# Patient Record
Sex: FEMALE | Race: WHITE | NOT HISPANIC OR LATINO | URBAN - METROPOLITAN AREA
[De-identification: names, ages, dates, MRNs, and addresses within clinical notes are randomized per-mention and may not be internally consistent; named-entity substitution may affect disease eponyms.]

---

## 2020-12-10 ENCOUNTER — INPATIENT (INPATIENT)
Facility: HOSPITAL | Age: 85
LOS: 4 days | Discharge: NURSING, REHAB OR LONG-TERM CARE | DRG: 293 | End: 2020-12-15
Attending: INTERNAL MEDICINE | Admitting: INTERNAL MEDICINE
Payer: MEDICARE

## 2020-12-10 VITALS
SYSTOLIC BLOOD PRESSURE: 124 MMHG | RESPIRATION RATE: 16 BRPM | DIASTOLIC BLOOD PRESSURE: 64 MMHG | TEMPERATURE: 98 F | OXYGEN SATURATION: 93 % | HEART RATE: 87 BPM | WEIGHT: 125 LBS

## 2020-12-10 DIAGNOSIS — R06.02 SHORTNESS OF BREATH: ICD-10-CM

## 2020-12-10 DIAGNOSIS — Z29.9 ENCOUNTER FOR PROPHYLACTIC MEASURES, UNSPECIFIED: ICD-10-CM

## 2020-12-10 DIAGNOSIS — F32.9 MAJOR DEPRESSIVE DISORDER, SINGLE EPISODE, UNSPECIFIED: ICD-10-CM

## 2020-12-10 LAB
ALBUMIN SERPL ELPH-MCNC: 2.7 G/DL — LOW (ref 3.5–5)
ALP SERPL-CCNC: 115 U/L — SIGNIFICANT CHANGE UP (ref 40–120)
ALT FLD-CCNC: 28 U/L DA — SIGNIFICANT CHANGE UP (ref 10–60)
ANION GAP SERPL CALC-SCNC: 9 MMOL/L — SIGNIFICANT CHANGE UP (ref 5–17)
APTT BLD: 29.9 SEC — SIGNIFICANT CHANGE UP (ref 27.5–35.5)
AST SERPL-CCNC: 30 U/L — SIGNIFICANT CHANGE UP (ref 10–40)
BASOPHILS # BLD AUTO: 0.04 K/UL — SIGNIFICANT CHANGE UP (ref 0–0.2)
BASOPHILS NFR BLD AUTO: 0.5 % — SIGNIFICANT CHANGE UP (ref 0–2)
BILIRUB SERPL-MCNC: 0.4 MG/DL — SIGNIFICANT CHANGE UP (ref 0.2–1.2)
BUN SERPL-MCNC: 23 MG/DL — HIGH (ref 7–18)
CALCIUM SERPL-MCNC: 8.7 MG/DL — SIGNIFICANT CHANGE UP (ref 8.4–10.5)
CHLORIDE SERPL-SCNC: 101 MMOL/L — SIGNIFICANT CHANGE UP (ref 96–108)
CO2 SERPL-SCNC: 28 MMOL/L — SIGNIFICANT CHANGE UP (ref 22–31)
CREAT SERPL-MCNC: 0.66 MG/DL — SIGNIFICANT CHANGE UP (ref 0.5–1.3)
EOSINOPHIL # BLD AUTO: 0.16 K/UL — SIGNIFICANT CHANGE UP (ref 0–0.5)
EOSINOPHIL NFR BLD AUTO: 2 % — SIGNIFICANT CHANGE UP (ref 0–6)
GLUCOSE SERPL-MCNC: 104 MG/DL — HIGH (ref 70–99)
HCT VFR BLD CALC: 41.4 % — SIGNIFICANT CHANGE UP (ref 34.5–45)
HGB BLD-MCNC: 13.4 G/DL — SIGNIFICANT CHANGE UP (ref 11.5–15.5)
IMM GRANULOCYTES NFR BLD AUTO: 0.4 % — SIGNIFICANT CHANGE UP (ref 0–1.5)
INR BLD: 1.02 RATIO — SIGNIFICANT CHANGE UP (ref 0.88–1.16)
LYMPHOCYTES # BLD AUTO: 1.09 K/UL — SIGNIFICANT CHANGE UP (ref 1–3.3)
LYMPHOCYTES # BLD AUTO: 13.4 % — SIGNIFICANT CHANGE UP (ref 13–44)
MCHC RBC-ENTMCNC: 31.5 PG — SIGNIFICANT CHANGE UP (ref 27–34)
MCHC RBC-ENTMCNC: 32.4 GM/DL — SIGNIFICANT CHANGE UP (ref 32–36)
MCV RBC AUTO: 97.4 FL — SIGNIFICANT CHANGE UP (ref 80–100)
MONOCYTES # BLD AUTO: 0.97 K/UL — HIGH (ref 0–0.9)
MONOCYTES NFR BLD AUTO: 11.9 % — SIGNIFICANT CHANGE UP (ref 2–14)
NEUTROPHILS # BLD AUTO: 5.84 K/UL — SIGNIFICANT CHANGE UP (ref 1.8–7.4)
NEUTROPHILS NFR BLD AUTO: 71.8 % — SIGNIFICANT CHANGE UP (ref 43–77)
NRBC # BLD: 0 /100 WBCS — SIGNIFICANT CHANGE UP (ref 0–0)
NT-PROBNP SERPL-SCNC: 430 PG/ML — SIGNIFICANT CHANGE UP (ref 0–450)
PLATELET # BLD AUTO: 243 K/UL — SIGNIFICANT CHANGE UP (ref 150–400)
POTASSIUM SERPL-MCNC: 3.9 MMOL/L — SIGNIFICANT CHANGE UP (ref 3.5–5.3)
POTASSIUM SERPL-SCNC: 3.9 MMOL/L — SIGNIFICANT CHANGE UP (ref 3.5–5.3)
PROT SERPL-MCNC: 7.1 G/DL — SIGNIFICANT CHANGE UP (ref 6–8.3)
PROTHROM AB SERPL-ACNC: 12.1 SEC — SIGNIFICANT CHANGE UP (ref 10.6–13.6)
RAPID RVP RESULT: SIGNIFICANT CHANGE UP
RBC # BLD: 4.25 M/UL — SIGNIFICANT CHANGE UP (ref 3.8–5.2)
RBC # FLD: 13.3 % — SIGNIFICANT CHANGE UP (ref 10.3–14.5)
SARS-COV-2 RNA SPEC QL NAA+PROBE: SIGNIFICANT CHANGE UP
SODIUM SERPL-SCNC: 138 MMOL/L — SIGNIFICANT CHANGE UP (ref 135–145)
TROPONIN I SERPL-MCNC: <0.015 NG/ML — SIGNIFICANT CHANGE UP (ref 0–0.04)
WBC # BLD: 8.13 K/UL — SIGNIFICANT CHANGE UP (ref 3.8–10.5)
WBC # FLD AUTO: 8.13 K/UL — SIGNIFICANT CHANGE UP (ref 3.8–10.5)

## 2020-12-10 PROCEDURE — 93010 ELECTROCARDIOGRAM REPORT: CPT

## 2020-12-10 PROCEDURE — 99285 EMERGENCY DEPT VISIT HI MDM: CPT

## 2020-12-10 PROCEDURE — 71045 X-RAY EXAM CHEST 1 VIEW: CPT | Mod: 26

## 2020-12-10 RX ORDER — BRIMONIDINE TARTRATE, TIMOLOL MALEATE 2; 5 MG/ML; MG/ML
1 SOLUTION/ DROPS OPHTHALMIC
Qty: 0 | Refills: 0 | DISCHARGE

## 2020-12-10 RX ORDER — METOPROLOL TARTRATE 50 MG
1 TABLET ORAL
Qty: 0 | Refills: 0 | DISCHARGE

## 2020-12-10 RX ORDER — LATANOPROST 0.05 MG/ML
1 SOLUTION/ DROPS OPHTHALMIC; TOPICAL AT BEDTIME
Refills: 0 | Status: DISCONTINUED | OUTPATIENT
Start: 2020-12-10 | End: 2020-12-15

## 2020-12-10 RX ORDER — FUROSEMIDE 40 MG
20 TABLET ORAL DAILY
Refills: 0 | Status: DISCONTINUED | OUTPATIENT
Start: 2020-12-11 | End: 2020-12-13

## 2020-12-10 RX ORDER — SERTRALINE 25 MG/1
100 TABLET, FILM COATED ORAL DAILY
Refills: 0 | Status: DISCONTINUED | OUTPATIENT
Start: 2020-12-10 | End: 2020-12-15

## 2020-12-10 RX ORDER — FUROSEMIDE 40 MG
20 TABLET ORAL ONCE
Refills: 0 | Status: COMPLETED | OUTPATIENT
Start: 2020-12-10 | End: 2020-12-10

## 2020-12-10 RX ORDER — SERTRALINE 25 MG/1
1 TABLET, FILM COATED ORAL
Qty: 0 | Refills: 0 | DISCHARGE

## 2020-12-10 RX ORDER — ENOXAPARIN SODIUM 100 MG/ML
40 INJECTION SUBCUTANEOUS DAILY
Refills: 0 | Status: DISCONTINUED | OUTPATIENT
Start: 2020-12-10 | End: 2020-12-15

## 2020-12-10 RX ORDER — TRAVOPROST 0.04 MG/ML
1 SOLUTION/ DROPS OPHTHALMIC
Qty: 0 | Refills: 0 | DISCHARGE

## 2020-12-10 RX ORDER — BIMATOPROST 0.3 MG/ML
1 SOLUTION/ DROPS OPHTHALMIC
Qty: 0 | Refills: 0 | DISCHARGE

## 2020-12-10 RX ORDER — METOPROLOL TARTRATE 50 MG
12.5 TABLET ORAL
Refills: 0 | Status: DISCONTINUED | OUTPATIENT
Start: 2020-12-10 | End: 2020-12-14

## 2020-12-10 RX ADMIN — LATANOPROST 1 DROP(S): 0.05 SOLUTION/ DROPS OPHTHALMIC; TOPICAL at 22:14

## 2020-12-10 RX ADMIN — Medication 20 MILLIGRAM(S): at 17:19

## 2020-12-10 RX ADMIN — Medication 12.5 MILLIGRAM(S): at 18:19

## 2020-12-10 NOTE — ED ADULT NURSE NOTE - CAS EDN DISCHARGE INTERVENTIONS
IV discontinued, cath removed intact/no redness, swelling or pain at site. IV intact/no redness, swelling or pain at site./Arm band on

## 2020-12-10 NOTE — H&P ADULT - HISTORY OF PRESENT ILLNESS
Patient is a 89 year old female, from home, hard of hearing, lives alone, with no remaining relatives, with PMHx of arthritis, Depression, scoliosis, presented with chief complaint of shortness of breath. Shortness of breath started 5 weeks ago. No precipitating event. For the past 5 weeks, shortness of breath has been progressively worse to the point where she cannot walk more than a block. However patient has difficulty ambulating at baseline. Patient also has increasing orthopnea, requiring incline when she sleeps. Patient has some chest pain from the shortness of breath. Otherwise no fever, chills, cough, abdominal pain, nausea, vomiting, diarrhea. No lower extremity swelling.    ED: In the ED, patient was noted saturating 93% on RA, CXR reading with interstitial CHF. Patient originally planned for dc home, however when transport arrived, patient was 90% RA and tachypneic. Pro-BNP was 430.

## 2020-12-10 NOTE — H&P ADULT - ASSESSMENT
Patient is 89 year old female with PMHx of Depression, arthritis presenting with shortness of breath, admitted for further workup of suspect CHF.

## 2020-12-10 NOTE — ED PROVIDER NOTE - NSFOLLOWUPINSTRUCTIONS_ED_ALL_ED_FT
Dyspnea    WHAT YOU NEED TO KNOW:    Dyspnea is breathing difficulty or discomfort. You may have labored, painful, or shallow breathing. You may feel breathless or short of breath. Dyspnea can occur during rest or with activity. You may have dyspnea for a short time, or it might become chronic. Dyspnea is often a symptom of a disease or condition.    DISCHARGE INSTRUCTIONS:    Seek care immediately if:   •Your signs and symptoms are the same or worse within 24 hours of treatment.       •You have shaking chills or a fever over 102°F.       •You have new pain, pressure, or tightness in your chest.       •You have a new or worse cough or wheezing, or you cough up blood.      •You feel like you cannot get enough air.      •The skin over your ribs or on your neck sinks in when you breathe.       •You have a severe headache with vomiting and abdominal pain.       •You feel confused or dizzy.      Contact your healthcare provider or specialist if:   •You have questions or concerns about your condition or care.          Medicines:    •Medicines may be used to treat the cause of your dyspnea. Medicines may reduce swelling in your airway or decrease extra fluid from around your heart or lungs. Other medicines may be used to decrease anxiety and help you feel calm and relaxed.      •Take your medicine as directed. Contact your healthcare provider if you think your medicine is not helping or if you have side effects. Tell him or her if you are allergic to any medicine. Keep a list of the medicines, vitamins, and herbs you take. Include the amounts, and when and why you take them. Bring the list or the pill bottles to follow-up visits. Carry your medicine list with you in case of an emergency.      Manage long-term dyspnea:   •Create an action plan. You and your healthcare provider can work together to create a plan for how to handle episodes of dyspnea. The plan can include daily activities, treatment changes, and what to do if you have severe breathing problems.      •Lean forward on your elbows when you sit. This helps your lungs expand and may make it easier to breathe.      •Use pursed-lip breathing any time you feel short of breath. Breathe in through your nose and then slowly breathe out through your mouth with your lips slightly puckered. It should take you twice as long to breathe out as it did to breathe in.  Breathe in Breathe out           •Do not smoke. Nicotine and other chemicals in cigarettes and cigars can cause lung damage and make it harder to breathe. Ask your healthcare provider for information if you currently smoke and need help to quit. E-cigarettes or smokeless tobacco still contain nicotine. Talk to your healthcare provider before you use these products.       •Reach or maintain a healthy weight. Your healthcare provider can help you create a safe weight loss plan if you are overweight.      •Exercise as directed. Exercise can help your lungs work more easily. Exercise can also help you lose weight if needed. Try to get at least 30 minutes of exercise most days of the week. Your healthcare provider can help you create an exercise plan that is safe for you.      Follow up with your healthcare provider or specialist as directed: Write down your questions so you remember to ask them during your visits.

## 2020-12-10 NOTE — H&P ADULT - PROBLEM SELECTOR PLAN 1
Given orthopnea, pulmonary findings, satisfies [place] criteria for CHF. Also with minor criteria dyspnea on exertion. BNP is unremarkable.  - Will start on diuresis with Lasix IV 20mg qdaily  - Monitor Intake and output, daily weights  - Echocardiogram ordered  - Monitor on tele, trend troponins

## 2020-12-10 NOTE — H&P ADULT - PROBLEM SELECTOR PLAN 3
IMPROVE VTE score: 4  Will manage with: Lovenox S/C    [ ] Previous VTE                                    3  [ ] Thrombophilia                                  2  [ x] Lower limb paralysis                        2  (unable to hold up >15 seconds)    [ ] Current Cancer (within 6 months)        2   [x] Immobilization > 24 hrs                    1  [ ] ICU/CCU stay > 24 hrs                      1  [x] Age > 60                                         1

## 2020-12-10 NOTE — ED PROVIDER NOTE - OBJECTIVE STATEMENT
89 y/o with a significant PMHx of arthritis presents to the ED c/o shortness of breath x1 month. Patient is now having difficulty ambulating secondary to shortness of breath. Denies any nausea, vomiting, chest pain, swelling, cough or any other complaints.

## 2020-12-10 NOTE — H&P ADULT - CONVERSATION DETAILS
Attempted to discuss advanced directives such as CPR and intubation. Patient stated that she feels fine and expects to leave tomorrow, deferred decision.

## 2020-12-10 NOTE — ED PROVIDER NOTE - CLINICAL SUMMARY MEDICAL DECISION MAKING FREE TEXT BOX
Patient presenting for sob. vital sig for saturation 92% on ra. ekg sinus. will obtain lab, xray, asses acs, chf, infection. ed obs and reassess

## 2020-12-10 NOTE — H&P ADULT - NSHPPHYSICALEXAM_GEN_ALL_CORE
General - anxious, sitting up on bed, on nasal cannula, elderly frail  Cardiovascular - +s1/s2 regular  Lungs - Bilateral basilar rales/crackles  Skin - Stasis changes of lower extremity  Abdomen - Normal bowel sounds, abdomen soft and nontender  Extremities - No pitting edema  Musculoskeletal - 5/5 strength, normal range of motion, no swollen or erythematous  joints.  Neurological – Alert and oriented x 3, no focal deficits

## 2020-12-10 NOTE — ED ADULT NURSE NOTE - NSIMPLEMENTINTERV_GEN_ALL_ED
Implemented All Fall with Harm Risk Interventions:  [place] to call system. Call bell, personal items and telephone within reach. Instruct patient to call for assistance. Room bathroom lighting operational. Non-slip footwear when patient is off stretcher. Physically safe environment: no spills, clutter or unnecessary equipment. Stretcher in lowest position, wheels locked, appropriate side rails in place. Provide visual cue, wrist band, yellow gown, etc. Monitor gait and stability. Monitor for mental status changes and reorient to person, place, and time. Review medications for side effects contributing to fall risk. Reinforce activity limits and safety measures with patient and family. Provide visual clues: red socks.

## 2020-12-10 NOTE — ED PROVIDER NOTE - PROGRESS NOTE DETAILS
xray reviewed, no infiltrate b.l. discussed with patient, likely covid infection, patient state she had negative covid test 1 week ago. endorses that she does not want to wait for covid result here. endorses she feels well and wish to go home. givne that patient had negative covid recently, given abx to cover for pna. patient endorses feeling well and endorses understanding to return if noting worsening symptoms. edyta precuaion instructed (Nano) - pt was evaluated and discharged prior to my shift. However when EMS arrived to transport pt home she was tachypneic and oxygen sat 90% on RA. I was asked to reassess pt who now agrees to stay for admission for SOB, possibly 2/2 to CHF. No PCP. Endorsed to unattached Dr Wells and MAR.

## 2020-12-10 NOTE — PATIENT PROFILE ADULT - ARRIVAL FROM
Therapy                            Cancellation/No-show Note      Date:  2020  Patient Name:  Nikhil Black  :  2014   MRN:  [id number]          Visit Information:  SLP Insurance Information: [employer]  Total # of Visits Approved: 50  Total # of Visits to Date: 18  No Show: 0  Canceled Appointment: 2    For today's appointment patient:  Cancelled    Reason given by patient:  Other:    Follow-up needed:  Pt has future appointments scheduled, no follow up needed    Comments:   slp out of office- cx does not count against pt     Signature: Electronically signed by CESAR Watson on 20 at 10:03 AM EST
Home

## 2020-12-11 LAB
24R-OH-CALCIDIOL SERPL-MCNC: 42.4 NG/ML — SIGNIFICANT CHANGE UP (ref 30–80)
A1C WITH ESTIMATED AVERAGE GLUCOSE RESULT: 5.8 % — HIGH (ref 4–5.6)
ALBUMIN SERPL ELPH-MCNC: 2.7 G/DL — LOW (ref 3.5–5)
ALP SERPL-CCNC: 100 U/L — SIGNIFICANT CHANGE UP (ref 40–120)
ALT FLD-CCNC: 27 U/L DA — SIGNIFICANT CHANGE UP (ref 10–60)
ANION GAP SERPL CALC-SCNC: 7 MMOL/L — SIGNIFICANT CHANGE UP (ref 5–17)
AST SERPL-CCNC: 32 U/L — SIGNIFICANT CHANGE UP (ref 10–40)
BASOPHILS # BLD AUTO: 0.04 K/UL — SIGNIFICANT CHANGE UP (ref 0–0.2)
BASOPHILS NFR BLD AUTO: 0.5 % — SIGNIFICANT CHANGE UP (ref 0–2)
BILIRUB SERPL-MCNC: 0.5 MG/DL — SIGNIFICANT CHANGE UP (ref 0.2–1.2)
BUN SERPL-MCNC: 21 MG/DL — HIGH (ref 7–18)
CALCIUM SERPL-MCNC: 8.6 MG/DL — SIGNIFICANT CHANGE UP (ref 8.4–10.5)
CHLORIDE SERPL-SCNC: 101 MMOL/L — SIGNIFICANT CHANGE UP (ref 96–108)
CHOLEST SERPL-MCNC: 138 MG/DL — SIGNIFICANT CHANGE UP
CK SERPL-CCNC: 79 U/L — SIGNIFICANT CHANGE UP (ref 21–215)
CO2 SERPL-SCNC: 30 MMOL/L — SIGNIFICANT CHANGE UP (ref 22–31)
CREAT SERPL-MCNC: 0.79 MG/DL — SIGNIFICANT CHANGE UP (ref 0.5–1.3)
CRP SERPL-MCNC: 2.54 MG/DL — HIGH (ref 0–0.4)
EOSINOPHIL # BLD AUTO: 0.19 K/UL — SIGNIFICANT CHANGE UP (ref 0–0.5)
EOSINOPHIL NFR BLD AUTO: 2.4 % — SIGNIFICANT CHANGE UP (ref 0–6)
ERYTHROCYTE [SEDIMENTATION RATE] IN BLOOD: 37 MM/HR — HIGH (ref 0–20)
ESTIMATED AVERAGE GLUCOSE: 120 MG/DL — HIGH (ref 68–114)
FERRITIN SERPL-MCNC: 110 NG/ML — SIGNIFICANT CHANGE UP (ref 15–150)
GLUCOSE SERPL-MCNC: 92 MG/DL — SIGNIFICANT CHANGE UP (ref 70–99)
HCT VFR BLD CALC: 40.8 % — SIGNIFICANT CHANGE UP (ref 34.5–45)
HDLC SERPL-MCNC: 43 MG/DL — LOW
HGB BLD-MCNC: 13 G/DL — SIGNIFICANT CHANGE UP (ref 11.5–15.5)
IMM GRANULOCYTES NFR BLD AUTO: 0.4 % — SIGNIFICANT CHANGE UP (ref 0–1.5)
LEGIONELLA AG UR QL: NEGATIVE — SIGNIFICANT CHANGE UP
LIPID PNL WITH DIRECT LDL SERPL: 78 MG/DL — SIGNIFICANT CHANGE UP
LYMPHOCYTES # BLD AUTO: 1.29 K/UL — SIGNIFICANT CHANGE UP (ref 1–3.3)
LYMPHOCYTES # BLD AUTO: 16.1 % — SIGNIFICANT CHANGE UP (ref 13–44)
MAGNESIUM SERPL-MCNC: 2 MG/DL — SIGNIFICANT CHANGE UP (ref 1.6–2.6)
MCHC RBC-ENTMCNC: 31 PG — SIGNIFICANT CHANGE UP (ref 27–34)
MCHC RBC-ENTMCNC: 31.9 GM/DL — LOW (ref 32–36)
MCV RBC AUTO: 97.4 FL — SIGNIFICANT CHANGE UP (ref 80–100)
MONOCYTES # BLD AUTO: 1.11 K/UL — HIGH (ref 0–0.9)
MONOCYTES NFR BLD AUTO: 13.9 % — SIGNIFICANT CHANGE UP (ref 2–14)
NEUTROPHILS # BLD AUTO: 5.35 K/UL — SIGNIFICANT CHANGE UP (ref 1.8–7.4)
NEUTROPHILS NFR BLD AUTO: 66.7 % — SIGNIFICANT CHANGE UP (ref 43–77)
NON HDL CHOLESTEROL: 95 MG/DL — SIGNIFICANT CHANGE UP
NRBC # BLD: 0 /100 WBCS — SIGNIFICANT CHANGE UP (ref 0–0)
PHOSPHATE SERPL-MCNC: 4 MG/DL — SIGNIFICANT CHANGE UP (ref 2.5–4.5)
PLATELET # BLD AUTO: 235 K/UL — SIGNIFICANT CHANGE UP (ref 150–400)
POTASSIUM SERPL-MCNC: 3.9 MMOL/L — SIGNIFICANT CHANGE UP (ref 3.5–5.3)
POTASSIUM SERPL-SCNC: 3.9 MMOL/L — SIGNIFICANT CHANGE UP (ref 3.5–5.3)
PROCALCITONIN SERPL-MCNC: 0.05 NG/ML — SIGNIFICANT CHANGE UP (ref 0.02–0.1)
PROT SERPL-MCNC: 6.8 G/DL — SIGNIFICANT CHANGE UP (ref 6–8.3)
RBC # BLD: 4.19 M/UL — SIGNIFICANT CHANGE UP (ref 3.8–5.2)
RBC # FLD: 13.2 % — SIGNIFICANT CHANGE UP (ref 10.3–14.5)
SODIUM SERPL-SCNC: 138 MMOL/L — SIGNIFICANT CHANGE UP (ref 135–145)
TRIGL SERPL-MCNC: 84 MG/DL — SIGNIFICANT CHANGE UP
TROPONIN I SERPL-MCNC: <0.015 NG/ML — SIGNIFICANT CHANGE UP (ref 0–0.04)
TSH SERPL-MCNC: 2.97 UU/ML — SIGNIFICANT CHANGE UP (ref 0.34–4.82)
VIT B12 SERPL-MCNC: 1337 PG/ML — HIGH (ref 232–1245)
WBC # BLD: 8.01 K/UL — SIGNIFICANT CHANGE UP (ref 3.8–10.5)
WBC # FLD AUTO: 8.01 K/UL — SIGNIFICANT CHANGE UP (ref 3.8–10.5)

## 2020-12-11 PROCEDURE — 93306 TTE W/DOPPLER COMPLETE: CPT | Mod: 26

## 2020-12-11 RX ORDER — CIPROFLOXACIN LACTATE 400MG/40ML
1 VIAL (ML) INTRAVENOUS
Qty: 4 | Refills: 0
Start: 2020-12-11 | End: 2020-12-14

## 2020-12-11 RX ADMIN — Medication 12.5 MILLIGRAM(S): at 06:05

## 2020-12-11 RX ADMIN — Medication 20 MILLIGRAM(S): at 06:04

## 2020-12-11 RX ADMIN — ENOXAPARIN SODIUM 40 MILLIGRAM(S): 100 INJECTION SUBCUTANEOUS at 12:12

## 2020-12-11 RX ADMIN — Medication 12.5 MILLIGRAM(S): at 17:53

## 2020-12-11 RX ADMIN — LATANOPROST 1 DROP(S): 0.05 SOLUTION/ DROPS OPHTHALMIC; TOPICAL at 22:09

## 2020-12-11 RX ADMIN — SERTRALINE 100 MILLIGRAM(S): 25 TABLET, FILM COATED ORAL at 12:12

## 2020-12-11 NOTE — PHYSICAL THERAPY INITIAL EVALUATION ADULT - CRITERIA FOR SKILLED THERAPEUTIC INTERVENTIONS
impairments found/therapy frequency/anticipated discharge recommendation/predicted duration of therapy intervention/functional limitations in following categories/risk reduction/prevention

## 2020-12-11 NOTE — PHYSICAL THERAPY INITIAL EVALUATION ADULT - GAIT DEVIATIONS NOTED, PT EVAL
increased time in double stance/decreased silva/decreased step length/decreased stride length/decreased weight-shifting ability

## 2020-12-11 NOTE — PHYSICAL THERAPY INITIAL EVALUATION ADULT - ACTIVE RANGE OF MOTION EXAMINATION, REHAB EVAL
b/l shoulders ~ 80 deg flx/bilateral upper extremity Active ROM was WFL (within functional limits)/bilateral  lower extremity Active ROM was WFL (within functional limits)

## 2020-12-11 NOTE — PHYSICAL THERAPY INITIAL EVALUATION ADULT - IMPAIRMENTS FOUND, PT EVAL
ROM/gait, locomotion, and balance/aerobic capacity/endurance/joint integrity and mobility/muscle strength/posture/ergonomics and body mechanics/ventilation and respiration/gas exchange

## 2020-12-11 NOTE — PHYSICAL THERAPY INITIAL EVALUATION ADULT - GENERAL OBSERVATIONS, REHAB EVAL
Consult received, chart reviewed. Patient received supine in bed, NAD, +NC. Patient agreed to EVALUATION from Physical Therapist.

## 2020-12-11 NOTE — PHYSICAL THERAPY INITIAL EVALUATION ADULT - MANUAL MUSCLE TESTING RESULTS, REHAB EVAL
BUE at least 3+/5 (within available ROM). b/l hip flx 4/5; knee 4+/5. ankles at least 3/5 functionally

## 2020-12-11 NOTE — PROGRESS NOTE ADULT - SUBJECTIVE AND OBJECTIVE BOX
INTERVAL HPI/OVERNIGHT EVENTS: not better yet.   Vital Signs Last 24 Hrs  T(C): 36.6 (11 Dec 2020 15:38), Max: 36.9 (10 Dec 2020 23:45)  T(F): 97.8 (11 Dec 2020 15:38), Max: 98.5 (10 Dec 2020 23:45)  HR: 88 (11 Dec 2020 15:38) (73 - 95)  BP: 129/58 (11 Dec 2020 15:38) (92/75 - 129/58)  BP(mean): --  RR: 18 (11 Dec 2020 15:38) (18 - 20)  SpO2: 97% (11 Dec 2020 15:38) (95% - 100%)  I&O's Summary    11 Dec 2020 07:01  -  11 Dec 2020 16:34  --------------------------------------------------------  IN: 0 mL / OUT: 750 mL / NET: -750 mL      MEDICATIONS  (STANDING):  enoxaparin Injectable 40 milliGRAM(s) SubCutaneous daily  furosemide   Injectable 20 milliGRAM(s) IV Push daily  latanoprost 0.005% Ophthalmic Solution 1 Drop(s) Both EYES at bedtime  metoprolol tartrate 12.5 milliGRAM(s) Oral two times a day  sertraline 100 milliGRAM(s) Oral daily    MEDICATIONS  (PRN):    LABS:                        13.0   8.01  )-----------( 235      ( 11 Dec 2020 07:30 )             40.8     12-11    138  |  101  |  21<H>  ----------------------------<  92  3.9   |  30  |  0.79    Ca    8.6      11 Dec 2020 07:30  Phos  4.0     12-11  Mg     2.0     12-11    TPro  6.8  /  Alb  2.7<L>  /  TBili  0.5  /  DBili  x   /  AST  32  /  ALT  27  /  AlkPhos  100  12-11    PT/INR - ( 10 Dec 2020 12:03 )   PT: 12.1 sec;   INR: 1.02 ratio         PTT - ( 10 Dec 2020 12:03 )  PTT:29.9 sec    CAPILLARY BLOOD GLUCOSE              REVIEW OF SYSTEMS:  CONSTITUTIONAL: No fever, weight loss, or fatigue  EYES: No eye pain, visual disturbances, or discharge  ENMT:  No difficulty hearing, tinnitus, vertigo; No sinus or throat pain  NECK: No pain or stiffness  RESPIRATORY: No cough, wheezing, chills or hemoptysis; No shortness of breath  CARDIOVASCULAR: No chest pain, palpitations, dizziness, or leg swelling  GASTROINTESTINAL: No abdominal or epigastric pain. No nausea, vomiting, or hematemesis; No diarrhea or constipation. No melena or hematochezia.  GENITOURINARY: No dysuria, frequency, hematuria, or incontinence  NEUROLOGICAL: No headaches, memory loss, loss of strength, numbness, or tremors    Consultant(s) Notes Reviewed:  [x ] YES  [ ] NO    PHYSICAL EXAM:  GENERAL: NAD, Oxygen NC   HEAD:  Atraumatic, Normocephalic  EYES: EOMI, PERRLA, conjunctiva and sclera clear  ENMT: No tonsillar erythema, exudates, or enlargement; Moist mucous membranes, Good dentition, No lesions  NECK: Supple, No JVD, Normal thyroid  NERVOUS SYSTEM:  Alert & Oriented X3, No focal deficit   CHEST/LUNG: Good air entry bilateral with no  rales, rhonchi, wheezing, or rubs  HEART: Regular rate and rhythm; No murmurs, rubs, or gallops  ABDOMEN: Soft, Nontender, Nondistended; Bowel sounds present  EXTREMITIES:  2+ Peripheral Pulses, No clubbing, cyanosis, or edema  SKIN: No rashes or lesions    Care Discussed with Consultants/Other Providers [ x] YES  [ ] NO

## 2020-12-11 NOTE — PHYSICAL THERAPY INITIAL EVALUATION ADULT - PLANNED THERAPY INTERVENTIONS, PT EVAL
neuromuscular re-education/ROM/stretching/transfer training/endurance training/balance training/bed mobility training/gait training/postural re-education/strengthening

## 2020-12-11 NOTE — CONSULT NOTE ADULT - SUBJECTIVE AND OBJECTIVE BOX
HISTORY OF PRESENT ILLNESS: HPI:  Patient is a 89 year old female, from home, hard of hearing, lives alone, with no remaining relatives, with PMHx of arthritis, Depression, scoliosis, presented with chief complaint of shortness of breath. Shortness of breath started 5 weeks ago. No precipitating event. For the past 5 weeks, shortness of breath has been progressively worse to the point where she cannot walk more than a block. However patient has difficulty ambulating at baseline. Patient also has increasing orthopnea, requiring incline when she sleeps. Patient has some chest pain from the shortness of breath. Otherwise no fever, chills, cough, abdominal pain, nausea, vomiting, diarrhea. No lower extremity swelling.    ED: In the ED, patient was noted saturating 93% on RA, CXR reading with interstitial CHF. Patient originally planned for dc home, however when transport arrived, patient was 90% RA and tachypneic. Pro-BNP was 430. (10 Dec 2020 17:52)      PAST MEDICAL & SURGICAL HISTORY:  Arthritis    No significant past surgical history        MEDICATIONS:  MEDICATIONS  (STANDING):  enoxaparin Injectable 40 milliGRAM(s) SubCutaneous daily  furosemide   Injectable 20 milliGRAM(s) IV Push daily  latanoprost 0.005% Ophthalmic Solution 1 Drop(s) Both EYES at bedtime  metoprolol tartrate 12.5 milliGRAM(s) Oral two times a day  sertraline 100 milliGRAM(s) Oral daily      Allergies    No Known Allergies    Intolerances        FAMILY HISTORY:    Non-contributary for premature coronary disease or sudden cardiac death    SOCIAL HISTORY:    [ X] Non-smoker  [ ] Smoker  [ ] Alcohol      REVIEW OF SYSTEMS:  [ ]chest pain  [  ]shortness of breath  [  ]palpitations  [  ]syncope  [ ]near syncope [ ]upper extremity weakness   [ ] lower extremity weakness  [  ]diplopia  [  ]altered mental status   [  ]fevers  [ ]chills [ ]nausea  [ ]vomitting  [  ]dysphagia    [ ]abdominal pain  [ ]melena  [ ]BRBPR    [  ]epistaxis  [  ]rash    [ ]lower extremity edema        [ ] All others negative	  [ ] Unable to obtain      LABS:	 	    CARDIAC MARKERS:  CARDIAC MARKERS ( 11 Dec 2020 07:30 )  <0.015 ng/mL / x     / 79 U/L / x     / x      CARDIAC MARKERS ( 10 Dec 2020 12:03 )  <0.015 ng/mL / x     / x     / x     / x                                  13.0   8.01  )-----------( 235      ( 11 Dec 2020 07:30 )             40.8     Hb Trend: 13.0<--, 13.4<--    12-11    138  |  101  |  21<H>  ----------------------------<  92  3.9   |  30  |  0.79    Ca    8.6      11 Dec 2020 07:30  Phos  4.0     12-11  Mg     2.0     12-11    TPro  6.8  /  Alb  2.7<L>  /  TBili  0.5  /  DBili  x   /  AST  32  /  ALT  27  /  AlkPhos  100  12-11    Creatinine Trend: 0.79<--, 0.66<--    Coags:      proBNP: Serum Pro-Brain Natriuretic Peptide: 430 pg/mL (12-10 @ 12:03)    Lipid Profile:   HgA1c:   TSH: Thyroid Stimulating Hormone, Serum: 2.97 uU/mL (12-11 @ 07:30)    PHYSICAL EXAM:  T(C): 36.6 (12-11-20 @ 09:13), Max: 36.9 (12-10-20 @ 23:45)  HR: 83 (12-11-20 @ 09:15) (61 - 95)  BP: 92/75 (12-11-20 @ 09:15) (92/75 - 135/70)  RR: 18 (12-11-20 @ 09:13) (16 - 20)  SpO2: 98% (12-11-20 @ 09:15) (93% - 99%)  Wt(kg): --     I&O's Summary      HEENT:  (-)icterus (-)pallor  CV: N S1 S2 1/6 EDILMA (+)2 Pulses B/l  Resp:  Crackles at the bases B/L, normal effort  GI: (+) BS Soft, NT, ND  Lymph:  (-)Edema, (-)obvious lymphadenopathy (+) stasis  Skin: Warm to touch, Normal turgor  Psych: Appropriate mood and affect        TELEMETRY: 	  sinus     ECG:  	Sinus 84 BPM    RADIOLOGY:         CXR:   Interstitial CHF    ASSESSMENT/PLAN: 	89y Female, from home, hard of hearing, lives alone, with no remaining relatives, with PMHx of arthritis, Depression, scoliosis, presented with chief complaint of shortness of breath suspected CHF.    - Echo  - Keep net negative  - will follow    I once again thank you for allowing me to participate in the care of your patient.  If you have any questions or concerns please do not hesitate to contact me.    Iker Peres MD, [facility]  BEEPER [phone]

## 2020-12-11 NOTE — CONSULT NOTE ADULT - SUBJECTIVE AND OBJECTIVE BOX
Time of visit:    CHIEF COMPLAINT: Patient is a 89y old  Female who presents with a chief complaint of Shortness of Breath (11 Dec 2020 10:33)      HPI:  Patient is a 89 year old female, from home, hard of hearing, lives alone, with no remaining relatives, with PMHx of arthritis, Depression, scoliosis, presented with chief complaint of shortness of breath. Shortness of breath started 5 weeks ago. No precipitating event. For the past 5 weeks, shortness of breath has been progressively worse to the point where she cannot walk more than a block. However patient has difficulty ambulating at baseline. Patient also has increasing orthopnea, requiring incline when she sleeps. Patient has some chest pain from the shortness of breath. Otherwise no fever, chills, cough, abdominal pain, nausea, vomiting, diarrhea. No lower extremity swelling.    ED: In the ED, patient was noted saturating 93% on RA, CXR reading with interstitial CHF. Patient originally planned for dc home, however when transport arrived, patient was 90% RA and tachypneic. Pro-BNP was 430. (10 Dec 2020 17:52)   Patient seen and examined.     PAST MEDICAL & SURGICAL HISTORY:  Arthritis    No significant past surgical history        Allergies    No Known Allergies    Intolerances        MEDICATIONS  (STANDING):  enoxaparin Injectable 40 milliGRAM(s) SubCutaneous daily  furosemide   Injectable 20 milliGRAM(s) IV Push daily  latanoprost 0.005% Ophthalmic Solution 1 Drop(s) Both EYES at bedtime  metoprolol tartrate 12.5 milliGRAM(s) Oral two times a day  sertraline 100 milliGRAM(s) Oral daily      MEDICATIONS  (PRN):   Medications up to date at time of exam.    Medications up to date at time of exam.    FAMILY HISTORY:      SOCIAL HISTORY  Smoking History: [   ] smoking/smoke exposure, [   ] former smoker  Living Condition: [   ] apartment, [   ] private house  Work History:   Travel History: denies recent travel  Illicit Substance Use: denies  Alcohol Use: denies    REVIEW OF SYSTEMS:    CONSTITUTIONAL:  denies fevers, chills, sweats, weight loss    HEENT:  denies diplopia or blurred vision, sore throat or runny nose.    CARDIOVASCULAR:  denies pressure, squeezing, tightness, or heaviness about the chest; no palpitations.    RESPIRATORY:  denies SOB, cough, GARCÍA, wheezing.    GASTROINTESTINAL:  denies abdominal pain, nausea, vomiting or diarrhea.    GENITOURINARY: denies dysuria, frequency or urgency.    NEUROLOGIC:  denies numbness, tingling, seizures or weakness.    PSYCHIATRIC:  denies disorder of thought or mood.    MSK: denies swelling, redness      PHYSICAL EXAMINATION:    GENERAL: The patient is a well-developed, well-nourished, in no apparent distress.     Vital Signs Last 24 Hrs  T(C): 36.6 (11 Dec 2020 15:38), Max: 36.9 (10 Dec 2020 23:45)  T(F): 97.8 (11 Dec 2020 15:38), Max: 98.5 (10 Dec 2020 23:45)  HR: 88 (11 Dec 2020 15:38) (73 - 92)  BP: 129/58 (11 Dec 2020 15:38) (92/75 - 129/58)  BP(mean): --  RR: 18 (11 Dec 2020 15:38) (18 - 20)  SpO2: 97% (11 Dec 2020 15:38) (95% - 100%)   (if applicable)    Chest Tube (if applicable)    HEENT: Head is normocephalic and atraumatic. Extraocular muscles are intact. Mucous membranes are moist.     NECK: Supple, no palpable adenopathy.    LUNGS: Clear to auscultation, + b/l crackles     HEART: Regular rate and rhythm without murmur.    ABDOMEN: Soft, nontender, and nondistended.  No hepatosplenomegaly is noted.    RENAL: No difficulty voiding, no pelvic pain    EXTREMITIES: Without any cyanosis, clubbing, rash, lesions or edema.    NEUROLOGIC: Awake, alert,    SKIN: Warm, dry, good turgor.      LABS:                        13.0   8.01  )-----------( 235      ( 11 Dec 2020 07:30 )             40.8     12-11    138  |  101  |  21<H>  ----------------------------<  92  3.9   |  30  |  0.79    Ca    8.6      11 Dec 2020 07:30  Phos  4.0     12-11  Mg     2.0     12-11    TPro  6.8  /  Alb  2.7<L>  /  TBili  0.5  /  DBili  x   /  AST  32  /  ALT  27  /  AlkPhos  100  12-11    PT/INR - ( 10 Dec 2020 12:03 )   PT: 12.1 sec;   INR: 1.02 ratio         PTT - ( 10 Dec 2020 12:03 )  PTT:29.9 sec      CARDIAC MARKERS ( 11 Dec 2020 07:30 )  <0.015 ng/mL / x     / 79 U/L / x     / x      CARDIAC MARKERS ( 10 Dec 2020 12:03 )  <0.015 ng/mL / x     / x     / x     / x            Serum Pro-Brain Natriuretic Peptide: 430 pg/mL (12-10-20 @ 12:03)      Procalcitonin, Serum: 0.05 ng/mL (12-11-20 @ 09:59)      MICROBIOLOGY: (if applicable)    RADIOLOGY & ADDITIONAL STUDIES:  EKG:   CXR:< from: Xray Chest 1 View- PORTABLE-Urgent (Xray Chest 1 View- PORTABLE-Urgent .) (12.10.20 @ 14:48) >    EXAM:  XR CHEST PORTABLE URGENT 1V                            PROCEDURE DATE:  12/10/2020          INTERPRETATION:  AP chest on December 10, 2020 at 12:34 PM. Patient is short of breath.    Heart is magnified by technique.    There is a diffusely increased interstitial pattern increased from March 29, 2008 consistent with CHF.    IMPRESSION: Interstitial CHF.            JAIR SENA M.D., ATTENDING RADIOLOGIST  This document has been electronically signed. Dec 10 2020  2:49PM    < end of copied text >    ECHO: < from: Transthoracic Echocardiogram (12.11.20 @ 07:20) >    Patient name: JUSTYNA LOMAX  YOB: 1926   Age: 89 (F)   MR#: [id number]  Study Date: 12/11/2020  Location: [address] [place], [place] [address]Sonographer: Cristhian Cooley SARAVANAN  Study quality: Technically difficult  Referring Physician:  RANDY NGO MD  Blood Pressure: 95/72 mmHg  Height: 152 cm  Weight: 56 kg  BSA: 1.5 m2  ------------------------------------------------------------------------    PROCEDURE: Transthoracic echocardiogram with 2-D, M-Mode  and complete spectral and color flow Doppler.  INDICATION: Heart failure, unspecified (I50.9)  HISTORY:  ------------------------------------------------------------------------  DIMENSIONS:  Dimensions:     Normal Values:  LA:     2.8 cm    2.0 - 4.0 cm  Ao:     2.6 cm    2.0 - 3.8 cm  SEPTUM: 1.1 cm   0.6 - 1.2 cm  PWT:    0.8 cm    0.6 - 1.1 cm  LVIDd:  3.3 cm    3.0 - 5.6 cm  LVIDs:  2.4 cm    1.8 - 4.0 cm      Derived Variables:  LVMI: 58 g/m2  RWT: 0.48    ------------------------------------------------------------------------  OBSERVATIONS:  Mitral Valve: Normal mitral valve. Trace mitral  regurgitation.  Aortic Root: Normal aortic root.  Aortic Valve: Aortic valve not well visualized. No aortic  stenosis. Trace aortic regurgitation.  Left Atrium: Normal left atrium.  LA volume index = 13  cc/m2.  Left Ventricle: Hyperdynamic left ventricular systolic  function (EF >70%). Not all LV wall segments were seen.  Normal left ventricular internal dimensions and wall  thicknesses. Grade I diastolic dysfunction (Impaired  relaxation).  Right Heart: Right atrium not well visualized. Right  ventricle not well visualized. Normal RV systolic function  (TAPSE 1.7 cm). Tricuspid valve not well seen. Pulmonic  valve not well seen.  Pericardium/PleuraNo pericardial effusion.  Hemodynamic: RA Pressure is 3 mm Hg. Insufficient tricuspid  regurgitation jet to allow calculation of RVSP.  ------------------------------------------------------------------------  CONCLUSIONS:  1. Normal mitral valve. Trace mitral regurgitation.  2. Aortic valve not well visualized. No aortic stenosis.  Trace aortic regurgitation.  3. Normal aortic root.  4. Normal left atrium.  5. Normal left ventricular internal dimensions and wall  thicknesses.  6. Hyperdynamic left ventricular systolic function (EF  >70%).  7. Grade I diastolic dysfunction (Impaired relaxation).  8. Right ventricle not well visualized. Normal RV systolic  function (TAPSE 1.7 cm).  9. No pericardial effusion.    *** Compared with echocardiogram report of 5/27/2012, no  significant changes noted.  ------------------------------------------------------------------------  Confirmed on  12/11/2020 - 14:47:10 by Luis A Avendano MD  ------------------------------------------------------------------------    < end of copied text >      IMPRESSION: 89y Female PAST MEDICAL & SURGICAL HISTORY:  Arthritis    No significant past surgical history     p/w             IMP: This is a 89 yr old white woman with HTn, depression ,arthritis admitted for SOB. CXR shows chronic lung changes . Physical exam and cardiac echo with ProBNP 430 do not support CHF. Covid-19 neg    Sugg;  -solumedrol   -albuterol inhaler 2 puff q6h  -repeat cxr in AM since pat had 3 days of lasix  -fall precaution   -social svc for VNS or placement

## 2020-12-12 LAB
ALBUMIN SERPL ELPH-MCNC: 2.8 G/DL — LOW (ref 3.5–5)
ALP SERPL-CCNC: 109 U/L — SIGNIFICANT CHANGE UP (ref 40–120)
ALT FLD-CCNC: 28 U/L DA — SIGNIFICANT CHANGE UP (ref 10–60)
ANION GAP SERPL CALC-SCNC: 6 MMOL/L — SIGNIFICANT CHANGE UP (ref 5–17)
AST SERPL-CCNC: 29 U/L — SIGNIFICANT CHANGE UP (ref 10–40)
BASOPHILS # BLD AUTO: 0.06 K/UL — SIGNIFICANT CHANGE UP (ref 0–0.2)
BASOPHILS NFR BLD AUTO: 0.8 % — SIGNIFICANT CHANGE UP (ref 0–2)
BILIRUB SERPL-MCNC: 0.4 MG/DL — SIGNIFICANT CHANGE UP (ref 0.2–1.2)
BUN SERPL-MCNC: 28 MG/DL — HIGH (ref 7–18)
CALCIUM SERPL-MCNC: 8.9 MG/DL — SIGNIFICANT CHANGE UP (ref 8.4–10.5)
CHLORIDE SERPL-SCNC: 99 MMOL/L — SIGNIFICANT CHANGE UP (ref 96–108)
CO2 SERPL-SCNC: 33 MMOL/L — HIGH (ref 22–31)
CREAT SERPL-MCNC: 0.65 MG/DL — SIGNIFICANT CHANGE UP (ref 0.5–1.3)
EOSINOPHIL # BLD AUTO: 0.27 K/UL — SIGNIFICANT CHANGE UP (ref 0–0.5)
EOSINOPHIL NFR BLD AUTO: 3.5 % — SIGNIFICANT CHANGE UP (ref 0–6)
GLUCOSE SERPL-MCNC: 97 MG/DL — SIGNIFICANT CHANGE UP (ref 70–99)
HCT VFR BLD CALC: 42 % — SIGNIFICANT CHANGE UP (ref 34.5–45)
HGB BLD-MCNC: 13.3 G/DL — SIGNIFICANT CHANGE UP (ref 11.5–15.5)
IMM GRANULOCYTES NFR BLD AUTO: 0.6 % — SIGNIFICANT CHANGE UP (ref 0–1.5)
LYMPHOCYTES # BLD AUTO: 1.27 K/UL — SIGNIFICANT CHANGE UP (ref 1–3.3)
LYMPHOCYTES # BLD AUTO: 16.4 % — SIGNIFICANT CHANGE UP (ref 13–44)
MCHC RBC-ENTMCNC: 31.3 PG — SIGNIFICANT CHANGE UP (ref 27–34)
MCHC RBC-ENTMCNC: 31.7 GM/DL — LOW (ref 32–36)
MCV RBC AUTO: 98.8 FL — SIGNIFICANT CHANGE UP (ref 80–100)
MONOCYTES # BLD AUTO: 1.16 K/UL — HIGH (ref 0–0.9)
MONOCYTES NFR BLD AUTO: 14.9 % — HIGH (ref 2–14)
NEUTROPHILS # BLD AUTO: 4.95 K/UL — SIGNIFICANT CHANGE UP (ref 1.8–7.4)
NEUTROPHILS NFR BLD AUTO: 63.8 % — SIGNIFICANT CHANGE UP (ref 43–77)
NRBC # BLD: 0 /100 WBCS — SIGNIFICANT CHANGE UP (ref 0–0)
PLATELET # BLD AUTO: 239 K/UL — SIGNIFICANT CHANGE UP (ref 150–400)
POTASSIUM SERPL-MCNC: 3.7 MMOL/L — SIGNIFICANT CHANGE UP (ref 3.5–5.3)
POTASSIUM SERPL-SCNC: 3.7 MMOL/L — SIGNIFICANT CHANGE UP (ref 3.5–5.3)
PROT SERPL-MCNC: 7.1 G/DL — SIGNIFICANT CHANGE UP (ref 6–8.3)
RBC # BLD: 4.25 M/UL — SIGNIFICANT CHANGE UP (ref 3.8–5.2)
RBC # FLD: 13.4 % — SIGNIFICANT CHANGE UP (ref 10.3–14.5)
SARS-COV-2 IGG SERPL QL IA: NEGATIVE — SIGNIFICANT CHANGE UP
SARS-COV-2 IGM SERPL IA-ACNC: <0.1 INDEX — SIGNIFICANT CHANGE UP
SODIUM SERPL-SCNC: 138 MMOL/L — SIGNIFICANT CHANGE UP (ref 135–145)
WBC # BLD: 7.76 K/UL — SIGNIFICANT CHANGE UP (ref 3.8–10.5)
WBC # FLD AUTO: 7.76 K/UL — SIGNIFICANT CHANGE UP (ref 3.8–10.5)

## 2020-12-12 RX ADMIN — LATANOPROST 1 DROP(S): 0.05 SOLUTION/ DROPS OPHTHALMIC; TOPICAL at 21:31

## 2020-12-12 RX ADMIN — SERTRALINE 100 MILLIGRAM(S): 25 TABLET, FILM COATED ORAL at 13:33

## 2020-12-12 RX ADMIN — ENOXAPARIN SODIUM 40 MILLIGRAM(S): 100 INJECTION SUBCUTANEOUS at 13:33

## 2020-12-12 RX ADMIN — Medication 12.5 MILLIGRAM(S): at 05:49

## 2020-12-12 RX ADMIN — Medication 12.5 MILLIGRAM(S): at 18:19

## 2020-12-12 RX ADMIN — Medication 20 MILLIGRAM(S): at 05:49

## 2020-12-12 NOTE — PROGRESS NOTE ADULT - SUBJECTIVE AND OBJECTIVE BOX
Time of Visit:  Patient seen and examined.     MEDICATIONS  (STANDING):  enoxaparin Injectable 40 milliGRAM(s) SubCutaneous daily  furosemide   Injectable 20 milliGRAM(s) IV Push daily  latanoprost 0.005% Ophthalmic Solution 1 Drop(s) Both EYES at bedtime  metoprolol tartrate 12.5 milliGRAM(s) Oral two times a day  sertraline 100 milliGRAM(s) Oral daily      MEDICATIONS  (PRN):       Medications up to date at time of exam.      PHYSICAL EXAMINATION:  Patient has no new complaints.  GENERAL: The patient is a well-developed, well-nourished, in no apparent distress.     Vital Signs Last 24 Hrs  T(C): 36.9 (12 Dec 2020 15:48), Max: 37.6 (11 Dec 2020 21:30)  T(F): 98.4 (12 Dec 2020 15:48), Max: 99.7 (11 Dec 2020 21:30)  HR: 88 (12 Dec 2020 15:48) (71 - 88)  BP: 127/54 (12 Dec 2020 15:48) (119/58 - 136/63)  BP(mean): --  RR: 18 (12 Dec 2020 15:48) (18 - 18)  SpO2: 96% (12 Dec 2020 15:48) (95% - 100%)   (if applicable)    Chest Tube (if applicable)    HEENT: Head is normocephalic and atraumatic. Extraocular muscles are intact. Mucous membranes are moist.     NECK: Supple, no palpable adenopathy.    LUNGS: Clear to auscultation, no wheezing, rales, or rhonchi.    HEART: Regular rate and rhythm without murmur.    ABDOMEN: Soft, nontender, and nondistended.  No hepatosplenomegaly is noted.    : No painful voiding, no pelvic pain    EXTREMITIES: Without any cyanosis, clubbing, rash, lesions or edema.    NEUROLOGIC: Awake, alert, oriented, grossly intact    SKIN: Warm, dry, good turgor.      LABS:                        13.3   7.76  )-----------( 239      ( 12 Dec 2020 08:46 )             42.0     12-12    138  |  99  |  28<H>  ----------------------------<  97  3.7   |  33<H>  |  0.65    Ca    8.9      12 Dec 2020 08:46  Phos  4.0     12-11  Mg     2.0     12-11    TPro  7.1  /  Alb  2.8<L>  /  TBili  0.4  /  DBili  x   /  AST  29  /  ALT  28  /  AlkPhos  109  12-12          CARDIAC MARKERS ( 11 Dec 2020 07:30 )  <0.015 ng/mL / x     / 79 U/L / x     / x            Serum Pro-Brain Natriuretic Peptide: 430 pg/mL (12-10-20 @ 12:03)      Procalcitonin, Serum: 0.05 ng/mL (12-11-20 @ 09:59)      MICROBIOLOGY: (if applicable)    RADIOLOGY & ADDITIONAL STUDIES:  EKG:   CXR:  ECHO:    IMPRESSION: 89y Female PAST MEDICAL & SURGICAL HISTORY:  Arthritis    No significant past surgical history     p/w           RECOMMENDATIONS:

## 2020-12-12 NOTE — PROGRESS NOTE ADULT - SUBJECTIVE AND OBJECTIVE BOX
INTERVAL HPI/OVERNIGHT EVENTS: No new concerns.   Vital Signs Last 24 Hrs  T(C): 36.7 (12 Dec 2020 11:11), Max: 37.6 (11 Dec 2020 21:30)  T(F): 98 (12 Dec 2020 11:11), Max: 99.7 (11 Dec 2020 21:30)  HR: 78 (12 Dec 2020 11:11) (71 - 88)  BP: 126/69 (12 Dec 2020 11:11) (119/58 - 136/63)  BP(mean): --  RR: 18 (12 Dec 2020 11:11) (18 - 18)  SpO2: 96% (12 Dec 2020 11:11) (95% - 100%)  I&O's Summary    11 Dec 2020 07:01  -  12 Dec 2020 07:00  --------------------------------------------------------  IN: 0 mL / OUT: 1050 mL / NET: -1050 mL    12 Dec 2020 07:01  -  12 Dec 2020 12:58  --------------------------------------------------------  IN: 200 mL / OUT: 400 mL / NET: -200 mL      MEDICATIONS  (STANDING):  enoxaparin Injectable 40 milliGRAM(s) SubCutaneous daily  furosemide   Injectable 20 milliGRAM(s) IV Push daily  latanoprost 0.005% Ophthalmic Solution 1 Drop(s) Both EYES at bedtime  metoprolol tartrate 12.5 milliGRAM(s) Oral two times a day  sertraline 100 milliGRAM(s) Oral daily    MEDICATIONS  (PRN):    LABS:                        13.3   7.76  )-----------( 239      ( 12 Dec 2020 08:46 )             42.0     12-12    138  |  99  |  28<H>  ----------------------------<  97  3.7   |  33<H>  |  0.65    Ca    8.9      12 Dec 2020 08:46  Phos  4.0     12-11  Mg     2.0     12-11    TPro  7.1  /  Alb  2.8<L>  /  TBili  0.4  /  DBili  x   /  AST  29  /  ALT  28  /  AlkPhos  109  12-12        CAPILLARY BLOOD GLUCOSE              REVIEW OF SYSTEMS:  CONSTITUTIONAL: No fever, weight loss, or fatigue  EYES: No eye pain, visual disturbances, or discharge  ENMT:  No difficulty hearing, tinnitus, vertigo; No sinus or throat pain  NECK: No pain or stiffness  RESPIRATORY: No cough, wheezing, chills or hemoptysis; No shortness of breath  CARDIOVASCULAR: No chest pain, palpitations, dizziness, or leg swelling  GASTROINTESTINAL: No abdominal or epigastric pain. No nausea, vomiting, or hematemesis; No diarrhea or constipation. No melena or hematochezia.  GENITOURINARY: No dysuria, frequency, hematuria, or incontinence  NEUROLOGICAL: No headaches, memory loss, loss of strength, numbness, or tremors      Consultant(s) Notes Reviewed:  [x ] YES  [ ] NO    PHYSICAL EXAM:  GENERAL: NAD, Oxygen   HEAD:  Atraumatic, Normocephalic  EYES: EOMI, PERRLA, conjunctiva and sclera clear  ENMT: No tonsillar erythema, exudates, or enlargement; Moist mucous membranes, Good dentition, No lesions  NECK: Supple, No JVD, Normal thyroid  NERVOUS SYSTEM:  Alert & Oriented X3, No focal deficit   CHEST/LUNG: Good air entry bilateral with no  rales, rhonchi, wheezing, or rubs  HEART: Regular rate and rhythm; No murmurs, rubs, or gallops  ABDOMEN: Soft, Nontender, Nondistended; Bowel sounds present  EXTREMITIES:  2+ Peripheral Pulses, No clubbing, cyanosis, or edema  SKIN: No rashes or lesions    Care Discussed with Consultants/Other Providers [ x] YES  [ ] NO

## 2020-12-12 NOTE — PROGRESS NOTE ADULT - SUBJECTIVE AND OBJECTIVE BOX
pt seen and examined, no complaints, ROS - .     enoxaparin Injectable 40 milliGRAM(s) SubCutaneous daily  furosemide   Injectable 20 milliGRAM(s) IV Push daily  latanoprost 0.005% Ophthalmic Solution 1 Drop(s) Both EYES at bedtime  metoprolol tartrate 12.5 milliGRAM(s) Oral two times a day  sertraline 100 milliGRAM(s) Oral daily                            13.0   8.01  )-----------( 235      ( 11 Dec 2020 07:30 )             40.8       Hemoglobin: 13.0 g/dL (12-11 @ 07:30)  Hemoglobin: 13.4 g/dL (12-10 @ 12:03)      12-11    138  |  101  |  21<H>  ----------------------------<  92  3.9   |  30  |  0.79    Ca    8.6      11 Dec 2020 07:30  Phos  4.0     12-11  Mg     2.0     12-11    TPro  6.8  /  Alb  2.7<L>  /  TBili  0.5  /  DBili  x   /  AST  32  /  ALT  27  /  AlkPhos  100  12-11    Creatinine Trend: 0.79<--, 0.66<--    COAGS:     CARDIAC MARKERS ( 11 Dec 2020 07:30 )  <0.015 ng/mL / x     / 79 U/L / x     / x      CARDIAC MARKERS ( 10 Dec 2020 12:03 )  <0.015 ng/mL / x     / x     / x     / x            T(C): 36.4 (12-12-20 @ 05:39), Max: 37.6 (12-11-20 @ 21:30)  HR: 76 (12-12-20 @ 05:39) (73 - 88)  BP: 119/58 (12-12-20 @ 05:39) (92/75 - 136/63)  RR: 18 (12-12-20 @ 05:39) (18 - 18)  SpO2: 100% (12-12-20 @ 05:39) (95% - 100%)  Wt(kg): --    I&O's Summary    11 Dec 2020 07:01  -  12 Dec 2020 05:45  --------------------------------------------------------  IN: 0 mL / OUT: 1050 mL / NET: -1050 mL      HEENT:  (-)icterus (-)pallor  CV: N S1 S2 1/6 EDILMA (+)2 Pulses B/l  Resp:  Crackles at the bases B/L, normal effort  GI: (+) BS Soft, NT, ND  Lymph:  (-)Edema, (-)obvious lymphadenopathy (+) stasis  Skin: Warm to touch, Normal turgor  Psych: Appropriate mood and affect        TELEMETRY: 	  sinus     ECG:  	Sinus 84 BPM    RADIOLOGY:         CXR:   Interstitial CHF    ASSESSMENT/PLAN: 	89y Female, from home, hard of hearing, lives alone, with no remaining relatives, with PMHx of arthritis, Depression, scoliosis, presented with chief complaint of shortness of breath suspected CHF.    - Echo  - Keep net negative, with lasix   - tolerating BB

## 2020-12-13 ENCOUNTER — TRANSCRIPTION ENCOUNTER (OUTPATIENT)
Age: 85
End: 2020-12-13

## 2020-12-13 LAB
ALBUMIN SERPL ELPH-MCNC: 3 G/DL — LOW (ref 3.5–5)
ALP SERPL-CCNC: 128 U/L — HIGH (ref 40–120)
ALT FLD-CCNC: 32 U/L DA — SIGNIFICANT CHANGE UP (ref 10–60)
ANION GAP SERPL CALC-SCNC: 7 MMOL/L — SIGNIFICANT CHANGE UP (ref 5–17)
AST SERPL-CCNC: 34 U/L — SIGNIFICANT CHANGE UP (ref 10–40)
BASOPHILS # BLD AUTO: 0.07 K/UL — SIGNIFICANT CHANGE UP (ref 0–0.2)
BASOPHILS NFR BLD AUTO: 0.8 % — SIGNIFICANT CHANGE UP (ref 0–2)
BILIRUB SERPL-MCNC: 0.6 MG/DL — SIGNIFICANT CHANGE UP (ref 0.2–1.2)
BUN SERPL-MCNC: 32 MG/DL — HIGH (ref 7–18)
CALCIUM SERPL-MCNC: 9.5 MG/DL — SIGNIFICANT CHANGE UP (ref 8.4–10.5)
CHLORIDE SERPL-SCNC: 97 MMOL/L — SIGNIFICANT CHANGE UP (ref 96–108)
CO2 SERPL-SCNC: 34 MMOL/L — HIGH (ref 22–31)
CREAT SERPL-MCNC: 0.69 MG/DL — SIGNIFICANT CHANGE UP (ref 0.5–1.3)
EOSINOPHIL # BLD AUTO: 0.38 K/UL — SIGNIFICANT CHANGE UP (ref 0–0.5)
EOSINOPHIL NFR BLD AUTO: 4.4 % — SIGNIFICANT CHANGE UP (ref 0–6)
GLUCOSE SERPL-MCNC: 99 MG/DL — SIGNIFICANT CHANGE UP (ref 70–99)
HCT VFR BLD CALC: 47.6 % — HIGH (ref 34.5–45)
HGB BLD-MCNC: 14.8 G/DL — SIGNIFICANT CHANGE UP (ref 11.5–15.5)
IMM GRANULOCYTES NFR BLD AUTO: 0.3 % — SIGNIFICANT CHANGE UP (ref 0–1.5)
LYMPHOCYTES # BLD AUTO: 1.89 K/UL — SIGNIFICANT CHANGE UP (ref 1–3.3)
LYMPHOCYTES # BLD AUTO: 21.8 % — SIGNIFICANT CHANGE UP (ref 13–44)
MCHC RBC-ENTMCNC: 31.1 GM/DL — LOW (ref 32–36)
MCHC RBC-ENTMCNC: 31.2 PG — SIGNIFICANT CHANGE UP (ref 27–34)
MCV RBC AUTO: 100.4 FL — HIGH (ref 80–100)
MONOCYTES # BLD AUTO: 1.05 K/UL — HIGH (ref 0–0.9)
MONOCYTES NFR BLD AUTO: 12.1 % — SIGNIFICANT CHANGE UP (ref 2–14)
NEUTROPHILS # BLD AUTO: 5.26 K/UL — SIGNIFICANT CHANGE UP (ref 1.8–7.4)
NEUTROPHILS NFR BLD AUTO: 60.6 % — SIGNIFICANT CHANGE UP (ref 43–77)
NRBC # BLD: 0 /100 WBCS — SIGNIFICANT CHANGE UP (ref 0–0)
PLATELET # BLD AUTO: 248 K/UL — SIGNIFICANT CHANGE UP (ref 150–400)
POTASSIUM SERPL-MCNC: 4 MMOL/L — SIGNIFICANT CHANGE UP (ref 3.5–5.3)
POTASSIUM SERPL-SCNC: 4 MMOL/L — SIGNIFICANT CHANGE UP (ref 3.5–5.3)
PROT SERPL-MCNC: 8 G/DL — SIGNIFICANT CHANGE UP (ref 6–8.3)
RBC # BLD: 4.74 M/UL — SIGNIFICANT CHANGE UP (ref 3.8–5.2)
RBC # FLD: 13.2 % — SIGNIFICANT CHANGE UP (ref 10.3–14.5)
S PNEUM AG UR QL: NEGATIVE — SIGNIFICANT CHANGE UP
SODIUM SERPL-SCNC: 138 MMOL/L — SIGNIFICANT CHANGE UP (ref 135–145)
WBC # BLD: 8.68 K/UL — SIGNIFICANT CHANGE UP (ref 3.8–10.5)
WBC # FLD AUTO: 8.68 K/UL — SIGNIFICANT CHANGE UP (ref 3.8–10.5)

## 2020-12-13 RX ORDER — FUROSEMIDE 40 MG
20 TABLET ORAL DAILY
Refills: 0 | Status: DISCONTINUED | OUTPATIENT
Start: 2020-12-13 | End: 2020-12-15

## 2020-12-13 RX ADMIN — SERTRALINE 100 MILLIGRAM(S): 25 TABLET, FILM COATED ORAL at 12:39

## 2020-12-13 RX ADMIN — Medication 12.5 MILLIGRAM(S): at 06:30

## 2020-12-13 RX ADMIN — Medication 12.5 MILLIGRAM(S): at 18:01

## 2020-12-13 RX ADMIN — ENOXAPARIN SODIUM 40 MILLIGRAM(S): 100 INJECTION SUBCUTANEOUS at 12:39

## 2020-12-13 RX ADMIN — LATANOPROST 1 DROP(S): 0.05 SOLUTION/ DROPS OPHTHALMIC; TOPICAL at 21:20

## 2020-12-13 RX ADMIN — Medication 20 MILLIGRAM(S): at 06:30

## 2020-12-13 NOTE — PROGRESS NOTE ADULT - SUBJECTIVE AND OBJECTIVE BOX
pt seen and examined, no complaints, ROS - .        enoxaparin Injectable 40 milliGRAM(s) SubCutaneous daily  furosemide   Injectable 20 milliGRAM(s) IV Push daily  latanoprost 0.005% Ophthalmic Solution 1 Drop(s) Both EYES at bedtime  metoprolol tartrate 12.5 milliGRAM(s) Oral two times a day  sertraline 100 milliGRAM(s) Oral daily                            13.3   7.76  )-----------( 239      ( 12 Dec 2020 08:46 )             42.0       Hemoglobin: 13.3 g/dL (12-12 @ 08:46)  Hemoglobin: 13.0 g/dL (12-11 @ 07:30)  Hemoglobin: 13.4 g/dL (12-10 @ 12:03)      12-12    138  |  99  |  28<H>  ----------------------------<  97  3.7   |  33<H>  |  0.65    Ca    8.9      12 Dec 2020 08:46  Phos  4.0     12-11  Mg     2.0     12-11    TPro  7.1  /  Alb  2.8<L>  /  TBili  0.4  /  DBili  x   /  AST  29  /  ALT  28  /  AlkPhos  109  12-12    Creatinine Trend: 0.65<--, 0.79<--, 0.66<--    COAGS:     CARDIAC MARKERS ( 11 Dec 2020 07:30 )  <0.015 ng/mL / x     / 79 U/L / x     / x      CARDIAC MARKERS ( 10 Dec 2020 12:03 )  <0.015 ng/mL / x     / x     / x     / x            T(C): 36.3 (12-13-20 @ 04:44), Max: 36.9 (12-12-20 @ 15:48)  HR: 88 (12-13-20 @ 04:44) (71 - 92)  BP: 136/63 (12-13-20 @ 04:44) (120/57 - 136/63)  RR: 18 (12-13-20 @ 04:44) (17 - 18)  SpO2: 97% (12-13-20 @ 04:44) (96% - 99%)  Wt(kg): --    I&O's Summary    11 Dec 2020 07:01  -  12 Dec 2020 07:00  --------------------------------------------------------  IN: 0 mL / OUT: 1050 mL / NET: -1050 mL    12 Dec 2020 07:01  -  13 Dec 2020 06:42  --------------------------------------------------------  IN: 570 mL / OUT: 850 mL / NET: -280 mL      HEENT:  (-)icterus (-)pallor  CV: N S1 S2 1/6 EDILMA (+)2 Pulses B/l  Resp:  Crackles at the bases B/L, normal effort  GI: (+) BS Soft, NT, ND  Lymph:  (-)Edema, (-)obvious lymphadenopathy (+) stasis  Skin: Warm to touch, Normal turgor  Psych: Appropriate mood and affect        TELEMETRY: 	  sinus     ECG:  	Sinus 84 BPM    RADIOLOGY:         CXR:   Interstitial CHF    ECHO: eho< from: Transthoracic Echocardiogram (12.11.20 @ 07:20) >  CONCLUSIONS:  1. Normal mitral valve. Trace mitral regurgitation.  2. Aortic valve not well visualized. No aortic stenosis.  Trace aortic regurgitation.  3. Normal aortic root.  4. Normal left atrium.  5. Normal left ventricular internal dimensions and wall  thicknesses.  6. Hyperdynamic left ventricular systolic function (EF  >70%).  7. Grade I diastolic dysfunction (Impaired relaxation).  8. Right ventricle not well visualized. Normal RV systolic  function (TAPSE 1.7 cm).  9. No pericardial effusion.    *** Compared with echocardiogram report of 5/27/2012, no  significant changes noted.  ------------------------------------------------------------------------  Confirmed on  12/11/2020 - 14:47:10 by Luis A Avendano MD  ------------------------------    < end of copied text >      ASSESSMENT/PLAN: 	89y Female, from home, hard of hearing, lives alone, with no remaining relatives, with PMHx of arthritis, Depression, scoliosis, presented with chief complaint of shortness of breath suspected CHF.    - Echo noted   - Keep net negative, with lasix , start PO route today   - tolerating BB

## 2020-12-13 NOTE — DISCHARGE NOTE PROVIDER - NSDCMRMEDTOKEN_GEN_ALL_CORE_FT
Combigan 0.2%-0.5% ophthalmic solution: 1 drop(s) to each affected eye every 12 hours  Lumigan 0.01% ophthalmic solution: 1 drop(s) to each affected eye once a day (in the evening)  metoprolol succinate 25 mg oral tablet, extended release: 1 tab(s) orally once a day  Travatan 0.004% ophthalmic solution: 1 drop(s) to each affected eye once a day (in the evening)  Zoloft 100 mg oral tablet: 1 tab(s) orally once a day

## 2020-12-13 NOTE — DISCHARGE NOTE PROVIDER - NSDCCPCAREPLAN_GEN_ALL_CORE_FT
PRINCIPAL DISCHARGE DIAGNOSIS  Diagnosis: Shortness of breath  Assessment and Plan of Treatment: You presented with shortness of breath probably due to CHF.  In ED, EKG showed Normal sinus rhythm. Troponin was negative x3. Patient was admitted to tele and cardiology was consulted. Echo showed EF 70% with Grade I diastolic function. Chest X ray showed interstitial CHF. Lasix IV was started. Patient is saturating 96% on 3L O2. Please continue take your meds as prescribed and follow up with PCP.      SECONDARY DISCHARGE DIAGNOSES  Diagnosis: Depression  Assessment and Plan of Treatment: You have history of depression without any suicidal or homicidal ideation. Sertaline was started. Please take your medications as prescribed and follow up with your PCP.       PRINCIPAL DISCHARGE DIAGNOSIS  Diagnosis: Diastolic CHF, acute on chronic  Assessment and Plan of Treatment: You presented with shortness of breath due to CHF.  In ED, EKG showed Normal sinus rhythm. Your cardiac enzymes Troponin was negative x3. Patient was admitted to tele and cardiology was consulted. Echo showed EF 70% with Grade I diastolic function. Chest X ray showed interstitial CHF. Lasix IV was started. Patient is saturating 96% on 3L O2. Please continue take your meds as prescribed and follow up with PCP.      SECONDARY DISCHARGE DIAGNOSES  Diagnosis: Depression  Assessment and Plan of Treatment: You have history of depression without any suicidal or homicidal ideation. Sertaline was started. Please take your medications as prescribed and follow up with your PCP.       PRINCIPAL DISCHARGE DIAGNOSIS  Diagnosis: Diastolic CHF, acute on chronic  Assessment and Plan of Treatment: You presented with shortness of breath due to CHF.  In ED, EKG showed Normal sinus rhythm. Your cardiac enzymes (Troponin) were negative x3. You were admitted to tele and cardiology was consulted. Echo showed EF 70% with Grade I diastolic function. Chest X ray showed interstitial CHF. Lasix IV was started. You were saturating 96% on 3L O2. We titrated your oxygen requirement to 1L and your IV lasix to oral. You will be discharged on 1L oxygen. Please continue take your meds as prescribed and follow up with your cardiologist in 1 - 2 weeks.      SECONDARY DISCHARGE DIAGNOSES  Diagnosis: Depression  Assessment and Plan of Treatment: You have history of depression without any suicidal or homicidal ideation. Sertaline was started. Please take your medications as prescribed and follow up with your PCP in 1-2 weeks.

## 2020-12-13 NOTE — DISCHARGE NOTE PROVIDER - CARE PROVIDER_API CALL
Iker Peres)  Cardiovascular Disease  [address] [facility] [address]  [place] [address]  Phone: [phone]  Fax: [phone]  Follow Up Time:

## 2020-12-13 NOTE — DISCHARGE NOTE PROVIDER - HOSPITAL COURSE
Patient is a 89 year old female, from home, hard of hearing, lives alone, with no remaining relatives, with PMHx of arthritis, Depression, scoliosis, presented with chief complaint of shortness of breath. Shortness of breath started 5 weeks ago. No precipitating event. For the past 5 weeks, shortness of breath has been progressively worse to the point where she cannot walk more than a block. However patient has difficulty ambulating at baseline. Patient also has increasing orthopnea, requiring incline when she sleeps. Patient has some chest pain from the shortness of breath. Otherwise no fever, chills, cough, abdominal pain, nausea, vomiting, diarrhea. No lower extremity swelling. In ED, EKG showed Normal sinus rhythm. Troponin was negative x3. Patient was admitted to tele and cardiology was consulted. Echo showed EF 70% with Grade I diastolic function. Chest X ray showed interstitial CHF. Lasix IV was started. Patient is saturating 96% on 3L O2.xxxxxx Patient is a 89 year old female, from home, hard of hearing, lives alone, with no remaining relatives, with PMHx of arthritis, Depression, scoliosis, presented with chief complaint of shortness of breath. Shortness of breath started 5 weeks ago. No precipitating event. For the past 5 weeks, shortness of breath has been progressively worse to the point where she cannot walk more than a block. However patient has difficulty ambulating at baseline. Patient also has increasing orthopnea, requiring incline when she sleeps. Patient has some chest pain from the shortness of breath. Otherwise no fever, chills, cough, abdominal pain, nausea, vomiting, diarrhea. No lower extremity swelling. In ED, EKG showed Normal sinus rhythm. Troponin was negative x3. Patient was admitted to [facility] and cardiology was consulted. Echo showed EF 70% with Grade I diastolic function. Chest X ray showed interstitial CHF. Lasix IV was started. Lasix changed to PO. Patient was saturating 96% on 3L. O2 tapered down to 1L with Pt saturating to 94% on 1L on ambulation.  Pt will be discharged to [facility] on 1L NC.  Pt is stable for discharge. This is just a brief summary of the hospital course.

## 2020-12-13 NOTE — PROGRESS NOTE ADULT - SUBJECTIVE AND OBJECTIVE BOX
Time of Visit:  Patient seen and examined. laying in bed comfortable    MEDICATIONS  (STANDING):  enoxaparin Injectable 40 milliGRAM(s) SubCutaneous daily  furosemide   Injectable 20 milliGRAM(s) IV Push daily  latanoprost 0.005% Ophthalmic Solution 1 Drop(s) Both EYES at bedtime  metoprolol tartrate 12.5 milliGRAM(s) Oral two times a day  sertraline 100 milliGRAM(s) Oral daily      MEDICATIONS  (PRN):       Medications up to date at time of exam.      PHYSICAL EXAMINATION:  Patient has no new complaints.  GENERAL: The patient is a well-developed, well-nourished, in no apparent distress.     Vital Signs Last 24 Hrs  T(C): 36.6 (13 Dec 2020 15:35), Max: 36.7 (12 Dec 2020 20:15)  T(F): 97.9 (13 Dec 2020 15:35), Max: 98 (12 Dec 2020 20:15)  HR: 74 (13 Dec 2020 15:35) (72 - 92)  BP: 119/62 (13 Dec 2020 15:35) (118/61 - 136/63)  BP(mean): --  RR: 18 (13 Dec 2020 15:35) (17 - 18)  SpO2: 98% (13 Dec 2020 15:35) (97% - 100%)   (if applicable)    Chest Tube (if applicable)    HEENT: Head is normocephalic and atraumatic. Extraocular muscles are intact. Mucous membranes are moist.     NECK: Supple, no palpable adenopathy.    LUNGS: Clear to auscultation, no wheezing, rales, or rhonchi.    HEART: Regular rate and rhythm without murmur.    ABDOMEN: Soft, nontender, and nondistended.  No hepatosplenomegaly is noted.    : No painful voiding, no pelvic pain    EXTREMITIES: Without any cyanosis, clubbing, rash, lesions or edema.    NEUROLOGIC: Awake, alert     SKIN: Warm, dry, good turgor.      LABS:                        14.8   8.68  )-----------( 248      ( 13 Dec 2020 07:23 )             47.6     12-13    138  |  97  |  32<H>  ----------------------------<  99  4.0   |  34<H>  |  0.69    Ca    9.5      13 Dec 2020 07:23    TPro  8.0  /  Alb  3.0<L>  /  TBili  0.6  /  DBili  x   /  AST  34  /  ALT  32  /  AlkPhos  128<H>  12-13                    Procalcitonin, Serum: 0.05 ng/mL (12-11-20 @ 09:59)      MICROBIOLOGY: (if applicable)    RADIOLOGY & ADDITIONAL STUDIES:  EKG:   CXR:  ECHO:    IMPRESSION: 89y Female PAST MEDICAL & SURGICAL HISTORY:  Arthritis    No significant past surgical history     p/w         IMP: This is a 89 yr old white woman with HTn, depression ,arthritis admitted for SOB. CXR shows chronic lung changes . Physical exam and cardiac echo with ProBNP 430 do not support CHF. Covid-19 neg    Sugg;    -albuterol inhaler 2 puff q6h  -repeat cxr in AM since pat had 3 days of lasix  -fall precaution   -social svc for VNS or placement .

## 2020-12-13 NOTE — PROGRESS NOTE ADULT - SUBJECTIVE AND OBJECTIVE BOX
PGY-1 Progress Note discussed with attending    PAGER #: [-----] TILL 5:00 PM  PLEASE CONTACT ON CALL TEAM:  - On Call Team (Please refer to Wayne) FROM 5:00 PM - 8:30PM  - Nightfloat Team FROM 8:30 -7:30 AM    CHIEF COMPLAINT & BRIEF HOSPITAL COURSE:    INTERVAL HPI/OVERNIGHT EVENTS:     MEDICATIONS:  enoxaparin Injectable 40 milliGRAM(s) SubCutaneous daily  furosemide   Injectable 20 milliGRAM(s) IV Push daily  latanoprost 0.005% Ophthalmic Solution 1 Drop(s) Both EYES at bedtime  metoprolol tartrate 12.5 milliGRAM(s) Oral two times a day  sertraline 100 milliGRAM(s) Oral daily      REVIEW OF SYSTEMS:  CONSTITUTIONAL: No fever, weight loss, or fatigue  RESPIRATORY: No cough, wheezing, chills or hemoptysis; No shortness of breath  CARDIOVASCULAR: No chest pain, palpitations, dizziness, or leg swelling  GASTROINTESTINAL: No abdominal pain. No nausea, vomiting, or hematemesis; No diarrhea or constipation. No melena or hematochezia.  GENITOURINARY: No dysuria or hematuria, urinary frequency  NEUROLOGICAL: No headaches, memory loss, loss of strength, numbness, or tremors  SKIN: No itching, burning, rashes, or lesions     Vital Signs Last 24 Hrs  T(C): 36.6 (13 Dec 2020 07:30), Max: 36.9 (12 Dec 2020 15:48)  T(F): 97.9 (13 Dec 2020 07:30), Max: 98.4 (12 Dec 2020 15:48)  HR: 72 (13 Dec 2020 07:30) (71 - 92)  BP: 129/57 (13 Dec 2020 07:30) (120/57 - 136/63)  BP(mean): --  RR: 18 (13 Dec 2020 07:30) (17 - 18)  SpO2: 100% (13 Dec 2020 07:30) (96% - 100%)    PHYSICAL EXAMINATION:  GENERAL: NAD, well built  HEAD:  Atraumatic, Normocephalic  EYES:  conjunctiva and sclera clear  NECK: Supple, No JVD, Normal thyroid  CHEST/LUNG: Clear to auscultation. Clear to percussion bilaterally; No rales, rhonchi, wheezing, or rubs  HEART: Regular rate and rhythm; No murmurs, rubs, or gallops  ABDOMEN: Soft, Nontender, Nondistended; Bowel sounds present  NERVOUS SYSTEM:  Alert & Oriented X3,    EXTREMITIES:  2+ Peripheral Pulses, No clubbing, cyanosis, or edema  SKIN: warm dry                          14.8   8.68  )-----------( 248      ( 13 Dec 2020 07:23 )             47.6     12-13    138  |  97  |  32<H>  ----------------------------<  99  4.0   |  34<H>  |  0.69    Ca    9.5      13 Dec 2020 07:23    TPro  8.0  /  Alb  3.0<L>  /  TBili  0.6  /  DBili  x   /  AST  34  /  ALT  32  /  AlkPhos  128<H>  12-13    LIVER FUNCTIONS - ( 13 Dec 2020 07:23 )  Alb: 3.0 g/dL / Pro: 8.0 g/dL / ALK PHOS: 128 U/L / ALT: 32 U/L DA / AST: 34 U/L / GGT: x                   CAPILLARY BLOOD GLUCOSE      RADIOLOGY & ADDITIONAL TESTS:

## 2020-12-14 LAB
ALBUMIN SERPL ELPH-MCNC: 2.6 G/DL — LOW (ref 3.5–5)
ALP SERPL-CCNC: 111 U/L — SIGNIFICANT CHANGE UP (ref 40–120)
ALT FLD-CCNC: 29 U/L DA — SIGNIFICANT CHANGE UP (ref 10–60)
ANION GAP SERPL CALC-SCNC: 7 MMOL/L — SIGNIFICANT CHANGE UP (ref 5–17)
AST SERPL-CCNC: 30 U/L — SIGNIFICANT CHANGE UP (ref 10–40)
BASOPHILS # BLD AUTO: 0.06 K/UL — SIGNIFICANT CHANGE UP (ref 0–0.2)
BASOPHILS NFR BLD AUTO: 0.9 % — SIGNIFICANT CHANGE UP (ref 0–2)
BILIRUB SERPL-MCNC: 0.6 MG/DL — SIGNIFICANT CHANGE UP (ref 0.2–1.2)
BUN SERPL-MCNC: 29 MG/DL — HIGH (ref 7–18)
CALCIUM SERPL-MCNC: 9 MG/DL — SIGNIFICANT CHANGE UP (ref 8.4–10.5)
CHLORIDE SERPL-SCNC: 98 MMOL/L — SIGNIFICANT CHANGE UP (ref 96–108)
CO2 SERPL-SCNC: 31 MMOL/L — SIGNIFICANT CHANGE UP (ref 22–31)
CREAT SERPL-MCNC: 0.62 MG/DL — SIGNIFICANT CHANGE UP (ref 0.5–1.3)
EOSINOPHIL # BLD AUTO: 0.3 K/UL — SIGNIFICANT CHANGE UP (ref 0–0.5)
EOSINOPHIL NFR BLD AUTO: 4.5 % — SIGNIFICANT CHANGE UP (ref 0–6)
GLUCOSE SERPL-MCNC: 91 MG/DL — SIGNIFICANT CHANGE UP (ref 70–99)
HCT VFR BLD CALC: 43.3 % — SIGNIFICANT CHANGE UP (ref 34.5–45)
HGB BLD-MCNC: 13.8 G/DL — SIGNIFICANT CHANGE UP (ref 11.5–15.5)
IMM GRANULOCYTES NFR BLD AUTO: 0.1 % — SIGNIFICANT CHANGE UP (ref 0–1.5)
LYMPHOCYTES # BLD AUTO: 1.31 K/UL — SIGNIFICANT CHANGE UP (ref 1–3.3)
LYMPHOCYTES # BLD AUTO: 19.6 % — SIGNIFICANT CHANGE UP (ref 13–44)
MCHC RBC-ENTMCNC: 31.6 PG — SIGNIFICANT CHANGE UP (ref 27–34)
MCHC RBC-ENTMCNC: 31.9 GM/DL — LOW (ref 32–36)
MCV RBC AUTO: 99.1 FL — SIGNIFICANT CHANGE UP (ref 80–100)
MONOCYTES # BLD AUTO: 0.88 K/UL — SIGNIFICANT CHANGE UP (ref 0–0.9)
MONOCYTES NFR BLD AUTO: 13.2 % — SIGNIFICANT CHANGE UP (ref 2–14)
NEUTROPHILS # BLD AUTO: 4.11 K/UL — SIGNIFICANT CHANGE UP (ref 1.8–7.4)
NEUTROPHILS NFR BLD AUTO: 61.7 % — SIGNIFICANT CHANGE UP (ref 43–77)
NRBC # BLD: 0 /100 WBCS — SIGNIFICANT CHANGE UP (ref 0–0)
PLATELET # BLD AUTO: 244 K/UL — SIGNIFICANT CHANGE UP (ref 150–400)
POTASSIUM SERPL-MCNC: 3.6 MMOL/L — SIGNIFICANT CHANGE UP (ref 3.5–5.3)
POTASSIUM SERPL-SCNC: 3.6 MMOL/L — SIGNIFICANT CHANGE UP (ref 3.5–5.3)
PROT SERPL-MCNC: 7.3 G/DL — SIGNIFICANT CHANGE UP (ref 6–8.3)
RBC # BLD: 4.37 M/UL — SIGNIFICANT CHANGE UP (ref 3.8–5.2)
RBC # FLD: 12.9 % — SIGNIFICANT CHANGE UP (ref 10.3–14.5)
SODIUM SERPL-SCNC: 136 MMOL/L — SIGNIFICANT CHANGE UP (ref 135–145)
WBC # BLD: 6.67 K/UL — SIGNIFICANT CHANGE UP (ref 3.8–10.5)
WBC # FLD AUTO: 6.67 K/UL — SIGNIFICANT CHANGE UP (ref 3.8–10.5)

## 2020-12-14 PROCEDURE — 71045 X-RAY EXAM CHEST 1 VIEW: CPT | Mod: 26

## 2020-12-14 RX ORDER — METOPROLOL TARTRATE 50 MG
25 TABLET ORAL DAILY
Refills: 0 | Status: DISCONTINUED | OUTPATIENT
Start: 2020-12-14 | End: 2020-12-15

## 2020-12-14 RX ORDER — POTASSIUM CHLORIDE 20 MEQ
40 PACKET (EA) ORAL ONCE
Refills: 0 | Status: COMPLETED | OUTPATIENT
Start: 2020-12-14 | End: 2020-12-14

## 2020-12-14 RX ADMIN — ENOXAPARIN SODIUM 40 MILLIGRAM(S): 100 INJECTION SUBCUTANEOUS at 12:49

## 2020-12-14 RX ADMIN — Medication 12.5 MILLIGRAM(S): at 06:29

## 2020-12-14 RX ADMIN — Medication 25 MILLIGRAM(S): at 14:26

## 2020-12-14 RX ADMIN — LATANOPROST 1 DROP(S): 0.05 SOLUTION/ DROPS OPHTHALMIC; TOPICAL at 21:17

## 2020-12-14 RX ADMIN — Medication 40 MILLIEQUIVALENT(S): at 12:50

## 2020-12-14 RX ADMIN — Medication 20 MILLIGRAM(S): at 06:29

## 2020-12-14 RX ADMIN — SERTRALINE 100 MILLIGRAM(S): 25 TABLET, FILM COATED ORAL at 12:49

## 2020-12-14 NOTE — PROGRESS NOTE ADULT - PROBLEM SELECTOR PLAN 2
No suicidal or homicidal ideation  Continue Zoloft
No suicidal or homicidal ideation  Continue Zoloft

## 2020-12-14 NOTE — PROGRESS NOTE ADULT - PROBLEM SELECTOR PLAN 1
Given orthopnea, pulmonary findings, satisfies [place] criteria for CHF. Also with minor criteria dyspnea on exertion. BNP is unremarkable.  - IV lasix changed to PO  - Monitor Intake and output, daily weights  - Echocardiogram:  Grade I diastolic dysfunction  - Trops -ve x2  - Monitor on teletroponins
Given orthopnea, pulmonary findings, satisfies [place] criteria for CHF. Also with minor criteria dyspnea on exertion. BNP is unremarkable.  - Will start on diuresis with Lasix IV 20mg qdaily  - Monitor Intake and output, daily weights  - Echocardiogram ordered  - Monitor on tele, trend troponins

## 2020-12-14 NOTE — PROGRESS NOTE ADULT - SUBJECTIVE AND OBJECTIVE BOX
Patient denies CP, SOB.  Review of systems otherwise (-)    enoxaparin Injectable 40 milliGRAM(s) SubCutaneous daily  furosemide    Tablet 20 milliGRAM(s) Oral daily  latanoprost 0.005% Ophthalmic Solution 1 Drop(s) Both EYES at bedtime  metoprolol tartrate 12.5 milliGRAM(s) Oral two times a day  potassium chloride    Tablet ER 40 milliEquivalent(s) Oral once  sertraline 100 milliGRAM(s) Oral daily                            13.8   6.67  )-----------( 244      ( 14 Dec 2020 07:27 )             43.3       Hemoglobin: 13.8 g/dL (12-14 @ 07:27)  Hemoglobin: 14.8 g/dL (12-13 @ 07:23)  Hemoglobin: 13.3 g/dL (12-12 @ 08:46)  Hemoglobin: 13.0 g/dL (12-11 @ 07:30)  Hemoglobin: 13.4 g/dL (12-10 @ 12:03)      12-14    136  |  98  |  29<H>  ----------------------------<  91  3.6   |  31  |  0.62    Ca    9.0      14 Dec 2020 07:27    TPro  7.3  /  Alb  2.6<L>  /  TBili  0.6  /  DBili  x   /  AST  30  /  ALT  29  /  AlkPhos  111  12-14    Creatinine Trend: 0.62<--, 0.69<--, 0.65<--, 0.79<--, 0.66<--    COAGS:           T(C): 36.6 (12-14-20 @ 07:55), Max: 36.8 (12-13-20 @ 23:29)  HR: 84 (12-14-20 @ 07:55) (74 - 86)  BP: 116/58 (12-14-20 @ 07:55) (116/58 - 130/54)  RR: 18 (12-14-20 @ 07:55) (18 - 18)  SpO2: 97% (12-14-20 @ 07:55) (97% - 100%)  Wt(kg): --    I&O's Summary    13 Dec 2020 07:01  -  14 Dec 2020 07:00  --------------------------------------------------------  IN: 0 mL / OUT: 600 mL / NET: -600 mL  HEENT:  (-)icterus (-)pallor  CV: N S1 S2 1/6 EDILMA (+)2 Pulses B/l  Resp:  Clear to ausculatation B/L, normal effort  GI: (+) BS Soft, NT, ND  Lymph:  (-)Edema, (-)obvious lymphadenopathy  Skin: Warm to touch, Normal turgor  Psych: Appropriate mood and affect        ASSESSMENT/PLAN: 	89y Female, from home, hard of hearing, lives alone, with no remaining relatives, with PMHx of arthritis, Depression, scoliosis, presented with chief complaint of acute on chronic diastolic CHF    - treatment will be beta blockers and control of central volume  - change Lopressor to toprol XL 25 QD  - cont PO lasix  - oupt cardiac f/u    Iker Peres MD, [facility]  BEEPER [phone]

## 2020-12-14 NOTE — PROGRESS NOTE ADULT - PROBLEM SELECTOR PLAN 3
IMPROVE VTE score: 4  Will manage with: Lovenox S/C    [ ] Previous VTE                                    3  [ ] Thrombophilia                                  2  [ x] Lower limb paralysis                        2  (unable to hold up >15 seconds)    [ ] Current Cancer (within 6 months)        2   [x] Immobilization > 24 hrs                    1  [ ] ICU/CCU stay > 24 hrs                      1  [x] Age > 60                                         1
IMPROVE VTE score: 4  Will manage with: Lovenox S/C    [ ] Previous VTE                                    3  [ ] Thrombophilia                                  2  [ x] Lower limb paralysis                        2  (unable to hold up >15 seconds)    [ ] Current Cancer (within 6 months)        2   [x] Immobilization > 24 hrs                    1  [ ] ICU/CCU stay > 24 hrs                      1  [x] Age > 60                                         1

## 2020-12-14 NOTE — PROGRESS NOTE ADULT - ASSESSMENT
89 year old female with PMHx of Depression, arthritis presenting with shortness of breath, admitted for further workup of suspect CHF.     Problem/Plan - 1:  ·  Problem: Shortness of breath .  Plan: Likely sec to Acute Diastolic CHF .  -Cardiology helping.   - Will change Lasix IV 20mg q daily to PO tomorrow.   - Monitor Intake and output, daily weights  - Echocardiogram noted and normal EF .      Problem/Plan - 2:  ·  Problem: Depression.  Plan: No suicidal or homicidal ideation  Continue Zoloft.      Problem/Plan - 3:  ·  Problem: Need for prophylactic measure.  Plan: IMPROVE VTE score: 4  Will manage with: Lovenox S/C    
89 year old female with PMHx of Depression, arthritis presenting with shortness of breath, admitted for further workup of suspect CHF.     Problem/Plan - 1:  ·  Problem: Shortness of breath .  Plan: Likely sec to Acute Diastolic CHF .  -Cardiology helping.   - Will start on diuresis with Lasix IV 20mg q daily  - Monitor Intake and output, daily weights  - Echocardiogram noted and normal EF .   - Monitor on tele, trend troponins.      Problem/Plan - 2:  ·  Problem: Depression.  Plan: No suicidal or homicidal ideation  Continue Zoloft.      Problem/Plan - 3:  ·  Problem: Need for prophylactic measure.  Plan: IMPROVE VTE score: 4  Will manage with: Lovenox S/C      
Patient is 89 year old female with PMHx of Depression, arthritis presenting with shortness of breath, admitted for further workup of suspect CHF.
Patient is 89 year old female with PMHx of Depression, arthritis presenting with shortness of breath, admitted for further workup of suspect CHF.

## 2020-12-14 NOTE — PROGRESS NOTE ADULT - SUBJECTIVE AND OBJECTIVE BOX
PGY-1 Progress Note discussed with attending    PAGER #: [[phone]] TILL 5:00 PM  PLEASE CONTACT ON CALL TEAM:  - On Call Team (Please refer to Wayne) FROM 5:00 PM - 8:30PM  - Nightfloat Team FROM 8:30 -7:30 AM    CHIEF COMPLAINT & BRIEF HOSPITAL COURSE:  Patient is a 89 year old female, from home, hard of hearing, lives alone, with no remaining relatives, with PMHx of arthritis, Depression, scoliosis, presented with chief complaint of shortness of breath. Shortness of breath started 5 weeks ago. No precipitating event. For the past 5 weeks, shortness of breath has been progressively worse to the point where she cannot walk more than a block. However patient has difficulty ambulating at baseline. Patient also has increasing orthopnea, requiring incline when she sleeps. Patient has some chest pain from the shortness of breath. Otherwise no fever, chills, cough, abdominal pain, nausea, vomiting, diarrhea. No lower extremity swelling. In ED, EKG showed Normal sinus rhythm. Troponin was negative x3. Patient was admitted to tele and cardiology was consulted. Echo showed EF 70% with Grade I diastolic function. Chest X ray showed interstitial CHF. Lasix IV was started.     INTERVAL HPI/OVERNIGHT EVENTS:   No acute overnight events. IV lasix changed to PO. Pending Covid results and placement.     MEDICATIONS  (STANDING):  enoxaparin Injectable 40 milliGRAM(s) SubCutaneous daily  furosemide    Tablet 20 milliGRAM(s) Oral daily  latanoprost 0.005% Ophthalmic Solution 1 Drop(s) Both EYES at bedtime  metoprolol succinate ER 25 milliGRAM(s) Oral daily  sertraline 100 milliGRAM(s) Oral daily    MEDICATIONS  (PRN):      REVIEW OF SYSTEMS:  CONSTITUTIONAL: No fever, weight loss, or fatigue  RESPIRATORY: No cough, wheezing, chills or hemoptysis; No shortness of breath  CARDIOVASCULAR: No chest pain, palpitations, dizziness, or leg swelling  GASTROINTESTINAL: No abdominal pain. No nausea, vomiting, or hematemesis; No diarrhea or constipation. No melena or hematochezia.  GENITOURINARY: No dysuria or hematuria, urinary frequency  NEUROLOGICAL: No headaches, memory loss, loss of strength, numbness, or tremors  SKIN: No itching, burning, rashes, or lesions     Vital Signs Last 24 Hrs  T(C): 36.7 (14 Dec 2020 11:09), Max: 36.8 (13 Dec 2020 23:29)  T(F): 98.1 (14 Dec 2020 11:09), Max: 98.2 (13 Dec 2020 23:29)  HR: 95 (14 Dec 2020 11:09) (74 - 95)  BP: 120/55 (14 Dec 2020 11:09) (116/58 - 130/54)  BP(mean): --  RR: 17 (14 Dec 2020 11:09) (17 - 18)  SpO2: 94% (14 Dec 2020 11:09) (94% - 100%)    PHYSICAL EXAMINATION:  GENERAL: NAD, well built  HEAD:  Atraumatic, Normocephalic  EYES:  conjunctiva and sclera clear  NECK: Supple, No JVD, Normal thyroid  CHEST/LUNG: Clear to auscultation. Clear to percussion bilaterally; No rales, rhonchi, wheezing, or rubs  HEART: Regular rate and rhythm; No murmurs, rubs, or gallops  ABDOMEN: Soft, Nontender, Nondistended; Bowel sounds present  NERVOUS SYSTEM:  Alert & Oriented X3,    EXTREMITIES:  2+ Peripheral Pulses, No clubbing, cyanosis, or edema  SKIN: warm dry                          13.8   6.67  )-----------( 244      ( 14 Dec 2020 07:27 )             43.3     12-14    136  |  98  |  29<H>  ----------------------------<  91  3.6   |  31  |  0.62    Ca    9.0      14 Dec 2020 07:27    TPro  7.3  /  Alb  2.6<L>  /  TBili  0.6  /  DBili  x   /  AST  30  /  ALT  29  /  AlkPhos  111  12-14    LIVER FUNCTIONS - ( 14 Dec 2020 07:27 )  Alb: 2.6 g/dL / Pro: 7.3 g/dL / ALK PHOS: 111 U/L / ALT: 29 U/L DA / AST: 30 U/L / GGT: x                   CAPILLARY BLOOD GLUCOSE              RADIOLOGY & ADDITIONAL TESTS:

## 2020-12-15 ENCOUNTER — TRANSCRIPTION ENCOUNTER (OUTPATIENT)
Age: 85
End: 2020-12-15

## 2020-12-15 VITALS — WEIGHT: 132.72 LBS

## 2020-12-15 LAB
ANION GAP SERPL CALC-SCNC: 8 MMOL/L — SIGNIFICANT CHANGE UP (ref 5–17)
BUN SERPL-MCNC: 30 MG/DL — HIGH (ref 7–18)
CALCIUM SERPL-MCNC: 9 MG/DL — SIGNIFICANT CHANGE UP (ref 8.4–10.5)
CHLORIDE SERPL-SCNC: 98 MMOL/L — SIGNIFICANT CHANGE UP (ref 96–108)
CO2 SERPL-SCNC: 34 MMOL/L — HIGH (ref 22–31)
CREAT SERPL-MCNC: 0.67 MG/DL — SIGNIFICANT CHANGE UP (ref 0.5–1.3)
GLUCOSE SERPL-MCNC: 92 MG/DL — SIGNIFICANT CHANGE UP (ref 70–99)
HCT VFR BLD CALC: 42.2 % — SIGNIFICANT CHANGE UP (ref 34.5–45)
HGB BLD-MCNC: 13.2 G/DL — SIGNIFICANT CHANGE UP (ref 11.5–15.5)
MAGNESIUM SERPL-MCNC: 2 MG/DL — SIGNIFICANT CHANGE UP (ref 1.6–2.6)
MCHC RBC-ENTMCNC: 31.1 PG — SIGNIFICANT CHANGE UP (ref 27–34)
MCHC RBC-ENTMCNC: 31.3 GM/DL — LOW (ref 32–36)
MCV RBC AUTO: 99.3 FL — SIGNIFICANT CHANGE UP (ref 80–100)
NRBC # BLD: 0 /100 WBCS — SIGNIFICANT CHANGE UP (ref 0–0)
PHOSPHATE SERPL-MCNC: 3 MG/DL — SIGNIFICANT CHANGE UP (ref 2.5–4.5)
PLATELET # BLD AUTO: 231 K/UL — SIGNIFICANT CHANGE UP (ref 150–400)
POTASSIUM SERPL-MCNC: 4 MMOL/L — SIGNIFICANT CHANGE UP (ref 3.5–5.3)
POTASSIUM SERPL-SCNC: 4 MMOL/L — SIGNIFICANT CHANGE UP (ref 3.5–5.3)
RBC # BLD: 4.25 M/UL — SIGNIFICANT CHANGE UP (ref 3.8–5.2)
RBC # FLD: 12.9 % — SIGNIFICANT CHANGE UP (ref 10.3–14.5)
SARS-COV-2 RNA SPEC QL NAA+PROBE: SIGNIFICANT CHANGE UP
SODIUM SERPL-SCNC: 140 MMOL/L — SIGNIFICANT CHANGE UP (ref 135–145)
WBC # BLD: 5.98 K/UL — SIGNIFICANT CHANGE UP (ref 3.8–10.5)
WBC # FLD AUTO: 5.98 K/UL — SIGNIFICANT CHANGE UP (ref 3.8–10.5)

## 2020-12-15 RX ORDER — FUROSEMIDE 40 MG
1 TABLET ORAL
Qty: 30 | Refills: 0
Start: 2020-12-15 | End: 2021-01-13

## 2020-12-15 RX ADMIN — ENOXAPARIN SODIUM 40 MILLIGRAM(S): 100 INJECTION SUBCUTANEOUS at 12:58

## 2020-12-15 RX ADMIN — Medication 25 MILLIGRAM(S): at 05:33

## 2020-12-15 RX ADMIN — Medication 20 MILLIGRAM(S): at 05:33

## 2020-12-15 RX ADMIN — SERTRALINE 100 MILLIGRAM(S): 25 TABLET, FILM COATED ORAL at 12:58

## 2020-12-15 NOTE — PROGRESS NOTE ADULT - SUBJECTIVE AND OBJECTIVE BOX
Patient denies CP, SOB.  Review of systems otherwise (-)    enoxaparin Injectable 40 milliGRAM(s) SubCutaneous daily  furosemide    Tablet 20 milliGRAM(s) Oral daily  latanoprost 0.005% Ophthalmic Solution 1 Drop(s) Both EYES at bedtime  metoprolol succinate ER 25 milliGRAM(s) Oral daily  sertraline 100 milliGRAM(s) Oral daily                            13.2   5.98  )-----------( 231      ( 15 Dec 2020 07:00 )             42.2       Hemoglobin: 13.2 g/dL (12-15 @ 07:00)  Hemoglobin: 13.8 g/dL (12-14 @ 07:27)  Hemoglobin: 14.8 g/dL (12-13 @ 07:23)  Hemoglobin: 13.3 g/dL (12-12 @ 08:46)  Hemoglobin: 13.0 g/dL (12-11 @ 07:30)      12-15    140  |  98  |  30<H>  ----------------------------<  92  4.0   |  34<H>  |  0.67    Ca    9.0      15 Dec 2020 07:00  Phos  3.0     12-15  Mg     2.0     12-15    TPro  7.3  /  Alb  2.6<L>  /  TBili  0.6  /  DBili  x   /  AST  30  /  ALT  29  /  AlkPhos  111  12-14    Creatinine Trend: 0.67<--, 0.62<--, 0.69<--, 0.65<--, 0.79<--, 0.66<--    COAGS:           T(C): 36.4 (12-15-20 @ 08:00), Max: 36.9 (12-14-20 @ 15:46)  HR: 84 (12-15-20 @ 10:46) (71 - 95)  BP: 119/56 (12-15-20 @ 10:46) (112/53 - 131/70)  RR: 18 (12-15-20 @ 08:00) (17 - 18)  SpO2: 92% (12-15-20 @ 10:46) (92% - 99%)  Wt(kg): --    I&O's Summary    14 Dec 2020 07:01  -  15 Dec 2020 07:00  --------------------------------------------------------  IN: 1252 mL / OUT: 880 mL / NET: 372 mL    15 Dec 2020 07:01  -  15 Dec 2020 10:53  --------------------------------------------------------  IN: 0 mL / OUT: 400 mL / NET: -400 mL        HEENT:  (-)icterus (-)pallor  CV: N S1 S2 1/6 EDILMA (+)2 Pulses B/l  Resp:  Clear to ausculatation B/L, normal effort  GI: (+) BS Soft, NT, ND  Lymph:  (-)Edema, (-)obvious lymphadenopathy  Skin: Warm to touch, Normal turgor  Psych: Appropriate mood and affect        ASSESSMENT/PLAN: 	89y Female, from home, hard of hearing, lives alone, with no remaining relatives, with PMHx of arthritis, Depression, scoliosis, presented with chief complaint of acute on chronic diastolic CHF    - treatment will be beta blockers and control of central volume  -cont  toprol XL 25 QD  - cont PO lasix  - oupt cardiac f/u  - progressing well    Iker Peres MD, [facility]  BEEPER [phone]

## 2020-12-15 NOTE — DIETITIAN INITIAL EVALUATION ADULT. - PERTINENT LABORATORY DATA
12-15 Na140 mmol/L Glu 92 mg/dL K+ 4.0 mmol/L Cr  0.67 mg/dL BUN 30 mg/dL<H>   12-15 Phos 3.0 mg/dL   12-14 Alb 2.6 g/dL<L>       12-11 Chol 138 mg/dL LDL --    HDL 43 mg/dL<L> Trig 84 mg/dL  12-11-20 @ 09:41 HgbA1C 5.8 [4.0 - 5.6]

## 2020-12-15 NOTE — DISCHARGE NOTE NURSING/CASE MANAGEMENT/SOCIAL WORK - PATIENT PORTAL LINK FT
You can access the FollowMyHealth Patient Portal offered by [facility] by registering at the following website: [email or web address]. By joining [email or web address]’s FollowMyHealth portal, you will also be able to view your health information using other applications (apps) compatible with our system.

## 2020-12-15 NOTE — DIETITIAN INITIAL EVALUATION ADULT. - PERTINENT MEDS FT
MEDICATIONS  (STANDING):  enoxaparin Injectable 40 milliGRAM(s) SubCutaneous daily  furosemide    Tablet 20 milliGRAM(s) Oral daily  latanoprost 0.005% Ophthalmic Solution 1 Drop(s) Both EYES at bedtime  metoprolol succinate ER 25 milliGRAM(s) Oral daily  sertraline 100 milliGRAM(s) Oral daily

## 2020-12-15 NOTE — DIETITIAN INITIAL EVALUATION ADULT. - ADD RECOMMEND
MD to evaluation fluid restriction as medically feasible MD to evaluation fluid restriction as medically feasible.

## 2020-12-15 NOTE — DIETITIAN INITIAL EVALUATION ADULT. - OTHER INFO
Pt lives home alone PTA, alert, oriented, hard of hearing, able to communicate well via written questions, pt voiced answers clearly and appropriately; not eating well in hospital, issue with dentures, tolerating soft food well, 50 to 75% intake at times per flow sheet; unclear recent wt changes, Ht=5' 4", usual bk=389 lb per pt, current fa=044.2 lb 12/11/2020, 132.7 lb 12/16/2020; denied GI distress, swallowing problem at present, food choices obtained and forwarded to Dietary, likes Ensure supplement; Pending discharge planning to skilled nursing facility for rehab when medically ready Pt lives home alone PTA, alert, oriented, hard of hearing, able to communicate well via written questions, pt voiced answers clearly and appropriately; not eating well in hospital, issue with dentures, tolerating soft food better, 50 to 75% intake at times per flow sheet; unclear recent wt changes, Ht=5' 4", usual hx=136 lb per pt, current hs=600.2 lb 12/11/2020, 132.7 lb 12/16/2020; denied GI distress, swallowing problem at present, food choices obtained and forwarded to Dietary, likes Ensure supplement; Pending discharge planning to skilled nursing facility for rehab when medically ready per team

## 2020-12-28 PROCEDURE — 85730 THROMBOPLASTIN TIME PARTIAL: CPT

## 2020-12-28 PROCEDURE — 84484 ASSAY OF TROPONIN QUANT: CPT

## 2020-12-28 PROCEDURE — 0225U NFCT DS DNA&RNA 21 SARSCOV2: CPT

## 2020-12-28 PROCEDURE — 85025 COMPLETE CBC W/AUTO DIFF WBC: CPT

## 2020-12-28 PROCEDURE — 83880 ASSAY OF NATRIURETIC PEPTIDE: CPT

## 2020-12-28 PROCEDURE — 93306 TTE W/DOPPLER COMPLETE: CPT

## 2020-12-28 PROCEDURE — 97116 GAIT TRAINING THERAPY: CPT

## 2020-12-28 PROCEDURE — 82306 VITAMIN D 25 HYDROXY: CPT

## 2020-12-28 PROCEDURE — 86140 C-REACTIVE PROTEIN: CPT

## 2020-12-28 PROCEDURE — 36415 COLL VENOUS BLD VENIPUNCTURE: CPT

## 2020-12-28 PROCEDURE — 71045 X-RAY EXAM CHEST 1 VIEW: CPT

## 2020-12-28 PROCEDURE — 84443 ASSAY THYROID STIM HORMONE: CPT

## 2020-12-28 PROCEDURE — 99285 EMERGENCY DEPT VISIT HI MDM: CPT

## 2020-12-28 PROCEDURE — 87449 NOS EACH ORGANISM AG IA: CPT

## 2020-12-28 PROCEDURE — 97162 PT EVAL MOD COMPLEX 30 MIN: CPT

## 2020-12-28 PROCEDURE — 85027 COMPLETE CBC AUTOMATED: CPT

## 2020-12-28 PROCEDURE — 86769 SARS-COV-2 COVID-19 ANTIBODY: CPT

## 2020-12-28 PROCEDURE — 85652 RBC SED RATE AUTOMATED: CPT

## 2020-12-28 PROCEDURE — 83735 ASSAY OF MAGNESIUM: CPT

## 2020-12-28 PROCEDURE — 97530 THERAPEUTIC ACTIVITIES: CPT

## 2020-12-28 PROCEDURE — 87899 AGENT NOS ASSAY W/OPTIC: CPT

## 2020-12-28 PROCEDURE — 82728 ASSAY OF FERRITIN: CPT

## 2020-12-28 PROCEDURE — 93005 ELECTROCARDIOGRAM TRACING: CPT

## 2020-12-28 PROCEDURE — 85610 PROTHROMBIN TIME: CPT

## 2020-12-28 PROCEDURE — U0003: CPT

## 2020-12-28 PROCEDURE — 80048 BASIC METABOLIC PNL TOTAL CA: CPT

## 2020-12-28 PROCEDURE — 80053 COMPREHEN METABOLIC PANEL: CPT

## 2020-12-28 PROCEDURE — 82607 VITAMIN B-12: CPT

## 2020-12-28 PROCEDURE — 80061 LIPID PANEL: CPT

## 2020-12-28 PROCEDURE — 83036 HEMOGLOBIN GLYCOSYLATED A1C: CPT

## 2020-12-28 PROCEDURE — 84145 PROCALCITONIN (PCT): CPT

## 2020-12-28 PROCEDURE — 97110 THERAPEUTIC EXERCISES: CPT

## 2020-12-28 PROCEDURE — 82550 ASSAY OF CK (CPK): CPT

## 2020-12-28 PROCEDURE — 84100 ASSAY OF PHOSPHORUS: CPT

## 2022-07-23 NOTE — PHYSICAL THERAPY INITIAL EVALUATION ADULT - PATIENT PROFILE REVIEW, REHAB EVAL
Awake and sitting up to side of bed. Sitter remains present. No distress noted.      Brenda Sarkar RN  07/23/22 0004 including labs and imaging/yes

## 2022-10-15 ENCOUNTER — INPATIENT (INPATIENT)
Facility: HOSPITAL | Age: 87
LOS: 11 days | Discharge: NURSING, REHAB OR LONG-TERM CARE | DRG: 480 | End: 2022-10-27
Attending: INTERNAL MEDICINE | Admitting: INTERNAL MEDICINE
Payer: MEDICARE

## 2022-10-15 VITALS
OXYGEN SATURATION: 94 % | HEART RATE: 114 BPM | WEIGHT: 104.94 LBS | TEMPERATURE: 98 F | HEIGHT: 62 IN | DIASTOLIC BLOOD PRESSURE: 66 MMHG | SYSTOLIC BLOOD PRESSURE: 144 MMHG | RESPIRATION RATE: 17 BRPM

## 2022-10-15 DIAGNOSIS — Z29.9 ENCOUNTER FOR PROPHYLACTIC MEASURES, UNSPECIFIED: ICD-10-CM
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
## 2022-10-15 DIAGNOSIS — S72.009A FRACTURE OF UNSPECIFIED PART OF NECK OF UNSPECIFIED FEMUR, INITIAL ENCOUNTER FOR CLOSED FRACTURE: ICD-10-CM

## 2022-10-15 DIAGNOSIS — S72.001A FRACTURE OF UNSPECIFIED PART OF NECK OF RIGHT FEMUR, INITIAL ENCOUNTER FOR CLOSED FRACTURE: ICD-10-CM

## 2022-10-15 DIAGNOSIS — F03.90 UNSPECIFIED DEMENTIA, UNSPECIFIED SEVERITY, WITHOUT BEHAVIORAL DISTURBANCE, PSYCHOTIC DISTURBANCE, MOOD DISTURBANCE, AND ANXIETY: ICD-10-CM

## 2022-10-15 PROBLEM — M19.90 UNSPECIFIED OSTEOARTHRITIS, UNSPECIFIED SITE: Chronic | Status: ACTIVE | Noted: 2020-12-10

## 2022-10-15 LAB
ALBUMIN SERPL ELPH-MCNC: 2.9 G/DL — LOW (ref 3.5–5)
ALP SERPL-CCNC: 118 U/L — SIGNIFICANT CHANGE UP (ref 40–120)
ALT FLD-CCNC: 15 U/L DA — SIGNIFICANT CHANGE UP (ref 10–60)
ANION GAP SERPL CALC-SCNC: 6 MMOL/L — SIGNIFICANT CHANGE UP (ref 5–17)
APTT BLD: 30.6 SEC — SIGNIFICANT CHANGE UP (ref 27.5–35.5)
AST SERPL-CCNC: 28 U/L — SIGNIFICANT CHANGE UP (ref 10–40)
BASOPHILS # BLD AUTO: 0.07 K/UL — SIGNIFICANT CHANGE UP (ref 0–0.2)
BASOPHILS NFR BLD AUTO: 0.7 % — SIGNIFICANT CHANGE UP (ref 0–2)
BILIRUB SERPL-MCNC: 0.5 MG/DL — SIGNIFICANT CHANGE UP (ref 0.2–1.2)
BLD GP AB SCN SERPL QL: SIGNIFICANT CHANGE UP
BUN SERPL-MCNC: 19 MG/DL — HIGH (ref 7–18)
CALCIUM SERPL-MCNC: 9.3 MG/DL — SIGNIFICANT CHANGE UP (ref 8.4–10.5)
CHLORIDE SERPL-SCNC: 98 MMOL/L — SIGNIFICANT CHANGE UP (ref 96–108)
CO2 SERPL-SCNC: 35 MMOL/L — HIGH (ref 22–31)
CREAT SERPL-MCNC: 0.66 MG/DL — SIGNIFICANT CHANGE UP (ref 0.5–1.3)
EGFR: 81 ML/MIN/1.73M2 — SIGNIFICANT CHANGE UP
EOSINOPHIL # BLD AUTO: 0.1 K/UL — SIGNIFICANT CHANGE UP (ref 0–0.5)
EOSINOPHIL NFR BLD AUTO: 1 % — SIGNIFICANT CHANGE UP (ref 0–6)
GLUCOSE SERPL-MCNC: 102 MG/DL — HIGH (ref 70–99)
HCT VFR BLD CALC: 38.5 % — SIGNIFICANT CHANGE UP (ref 34.5–45)
HGB BLD-MCNC: 12.3 G/DL — SIGNIFICANT CHANGE UP (ref 11.5–15.5)
IMM GRANULOCYTES NFR BLD AUTO: 0.4 % — SIGNIFICANT CHANGE UP (ref 0–0.9)
INR BLD: 1.02 RATIO — SIGNIFICANT CHANGE UP (ref 0.88–1.16)
LYMPHOCYTES # BLD AUTO: 1.76 K/UL — SIGNIFICANT CHANGE UP (ref 1–3.3)
LYMPHOCYTES # BLD AUTO: 16.8 % — SIGNIFICANT CHANGE UP (ref 13–44)
MCHC RBC-ENTMCNC: 31.9 GM/DL — LOW (ref 32–36)
MCHC RBC-ENTMCNC: 31.9 PG — SIGNIFICANT CHANGE UP (ref 27–34)
MCV RBC AUTO: 100 FL — SIGNIFICANT CHANGE UP (ref 80–100)
MONOCYTES # BLD AUTO: 0.86 K/UL — SIGNIFICANT CHANGE UP (ref 0–0.9)
MONOCYTES NFR BLD AUTO: 8.2 % — SIGNIFICANT CHANGE UP (ref 2–14)
NEUTROPHILS # BLD AUTO: 7.66 K/UL — HIGH (ref 1.8–7.4)
NEUTROPHILS NFR BLD AUTO: 72.9 % — SIGNIFICANT CHANGE UP (ref 43–77)
NRBC # BLD: 0 /100 WBCS — SIGNIFICANT CHANGE UP (ref 0–0)
PLATELET # BLD AUTO: 222 K/UL — SIGNIFICANT CHANGE UP (ref 150–400)
POTASSIUM SERPL-MCNC: 4.2 MMOL/L — SIGNIFICANT CHANGE UP (ref 3.5–5.3)
POTASSIUM SERPL-SCNC: 4.2 MMOL/L — SIGNIFICANT CHANGE UP (ref 3.5–5.3)
PROT SERPL-MCNC: 7.6 G/DL — SIGNIFICANT CHANGE UP (ref 6–8.3)
PROTHROM AB SERPL-ACNC: 12.2 SEC — SIGNIFICANT CHANGE UP (ref 10.5–13.4)
RBC # BLD: 3.85 M/UL — SIGNIFICANT CHANGE UP (ref 3.8–5.2)
RBC # FLD: 12 % — SIGNIFICANT CHANGE UP (ref 10.3–14.5)
SARS-COV-2 RNA SPEC QL NAA+PROBE: SIGNIFICANT CHANGE UP
SODIUM SERPL-SCNC: 139 MMOL/L — SIGNIFICANT CHANGE UP (ref 135–145)
WBC # BLD: 10.49 K/UL — SIGNIFICANT CHANGE UP (ref 3.8–10.5)
WBC # FLD AUTO: 10.49 K/UL — SIGNIFICANT CHANGE UP (ref 3.8–10.5)

## 2022-10-15 PROCEDURE — 70450 CT HEAD/BRAIN W/O DYE: CPT | Mod: 26,MA

## 2022-10-15 PROCEDURE — 72170 X-RAY EXAM OF PELVIS: CPT | Mod: 26

## 2022-10-15 PROCEDURE — 99285 EMERGENCY DEPT VISIT HI MDM: CPT

## 2022-10-15 PROCEDURE — 72125 CT NECK SPINE W/O DYE: CPT | Mod: 26,MA

## 2022-10-15 PROCEDURE — 93010 ELECTROCARDIOGRAM REPORT: CPT

## 2022-10-15 RX ORDER — KETOROLAC TROMETHAMINE 30 MG/ML
15 SYRINGE (ML) INJECTION EVERY 6 HOURS
Refills: 0 | Status: DISCONTINUED | OUTPATIENT
Start: 2022-10-15 | End: 2022-10-17

## 2022-10-15 RX ORDER — MORPHINE SULFATE 50 MG/1
1 CAPSULE, EXTENDED RELEASE ORAL ONCE
Refills: 0 | Status: DISCONTINUED | OUTPATIENT
Start: 2022-10-15 | End: 2022-10-21

## 2022-10-15 RX ORDER — LIDOCAINE 4 G/100G
1 CREAM TOPICAL EVERY 24 HOURS
Refills: 0 | Status: DISCONTINUED | OUTPATIENT
Start: 2022-10-15 | End: 2022-10-24

## 2022-10-15 RX ORDER — MORPHINE SULFATE 50 MG/1
4 CAPSULE, EXTENDED RELEASE ORAL ONCE
Refills: 0 | Status: DISCONTINUED | OUTPATIENT
Start: 2022-10-15 | End: 2022-10-15

## 2022-10-15 RX ORDER — LIDOCAINE 4 G/100G
1 CREAM TOPICAL ONCE
Refills: 0 | Status: COMPLETED | OUTPATIENT
Start: 2022-10-15 | End: 2022-10-15

## 2022-10-15 RX ADMIN — MORPHINE SULFATE 4 MILLIGRAM(S): 50 CAPSULE, EXTENDED RELEASE ORAL at 13:27

## 2022-10-15 RX ADMIN — MORPHINE SULFATE 4 MILLIGRAM(S): 50 CAPSULE, EXTENDED RELEASE ORAL at 13:57

## 2022-10-15 NOTE — H&P ADULT - ATTENDING COMMENTS
89 year old female, from home, hard of hearing, lives alone,  with PMHx of arthritis, Depression, scoliosis, dementia presents after a fall. Patient states that she slipped and fell      assessment  --  Closed fracture dislocation of hip joint., s/p fall, h/o arthritis, Depression, scoliosis, dementia     plan  --  admit to med, lidocaine patch, toradol prn, cont preadmit home meds, gi and dvt prophylaxis  cbc, bmp, mg, phos, lipids, tsh, bld cx, ua, ucx,      ortho cons  pain mgmt cons  soc work eval

## 2022-10-15 NOTE — ED PROVIDER NOTE - OBJECTIVE STATEMENT
89-year-old female presents status post fall with hip pain. Patient has dementia and has no complaints. EMS reports that she fell yesterday.

## 2022-10-15 NOTE — ED PROVIDER NOTE - PHYSICAL EXAMINATION
General: Well appearing, no acute distress, appears stated age  HEENT: normocephalic, atraumatic   Respiratory: normal work of breathing  MSK: mild tenderness at righ hip, shortened and internally rotated, normal pulse and sensation   Skin: warm, dry  Neuro: A&Ox3, person date and place but not sitaution, cranial nerves II-XII intact, 5/5 strength in all extremities, no sensory deficits,   Psych: appropriate affect

## 2022-10-15 NOTE — H&P ADULT - HISTORY OF PRESENT ILLNESS
89 year old female, from home, hard of hearing, lives alone,  with PMHx of arthritis, Depression, scoliosis, dementia presents after a fall. Patient states that she slipped and fell  89 year old female, from home, hard of hearing, lives alone,  with PMHx of arthritis, Depression, scoliosis, dementia presents after a fall. Patient states that she slipped and fell landing on her right side. Patient denies any head trauma or LOC but endorses right hip pain. Denies any headaches, numbness, tingling, fever, chills, nausea,  vomiting chest pain, SOB, abdominal pain, or change in bowel habits.     In the ED:  Afebrile, hemodynamically stable   CTH: tiny indeterminate lacunar infarcts, CT spine degenerative changes  Xray hip: dislocation right hip    89 year old female, from home, hard of hearing, lives alone,  with PMHx of arthritis, Depression, scoliosis, dementia presents after a fall. Patient states that she slipped and fell landing on her right side. Patient denies any head trauma or LOC but endorses right hip pain. Denies any headaches, numbness, tingling, fever, chills, nausea,  vomiting chest pain, SOB, abdominal pain, or change in bowel habits.     In the ED:  Afebrile, hemodynamically stable   CTH: tiny indeterminate lacunar infarcts, CT spine multiple degenerative changes  Xray hip: dislocation right hip    89 year old female, from home, hard of hearing, lives alone,  with PMHx of arthritis, Depression, scoliosis, dementia presents after a fall. Patient states that she slipped and fell landing on her right side. Patient denies any head trauma or LOC but endorses right hip pain. Denies any headaches, numbness, tingling, fever, chills, nausea,  vomiting chest pain, SOB, abdominal pain, or change in bowel habits.     In the ED:  Afebrile, hemodynamically stable   CTH: tiny indeterminate lacunar infarcts, CT spine multiple degenerative changes  Xray hip: dislocation right hip

## 2022-10-15 NOTE — H&P ADULT - PROBLEM SELECTOR PLAN 4
IMPROVE VTE Individual Risk Assessment          RISK                                                          Points  [  ] Previous VTE                                                3  [  ] Thrombophilia                                             2  [  ] Lower limb paralysis                                   2        (unable to hold up >15 seconds)    [  ] Current Cancer                                             2         (within 6 months)  [ x ] Immobilization > 24 hrs                              1  [  ] ICU/CCU stay > 24 hours                             1  [x  ] Age > 60                                                         1    IMPROVE VTE Score:         [    2     ]    Will hold DVT prophylaxis for possible surgery

## 2022-10-15 NOTE — ED ADULT NURSE NOTE - ED STAT RN HANDOFF DETAILS 2
Patient alert and verbal with confusion. Required constant verbal redirection. Pt noted constantly attempting pull out peripheral IV access and Mcgill catheter. No signs of pain noted. Patient alert and verbal with confusion. Required constant verbal redirection. Pt noted constantly attempting pull out peripheral IV access and Mcgill catheter. No signs of pain noted. Report endorsed to SOCORRO Culver.

## 2022-10-15 NOTE — H&P ADULT - NSHPLABSRESULTS_GEN_ALL_CORE
LABS:                        12.3   10.49 )-----------( 222      ( 15 Oct 2022 11:40 )             38.5     10-15    139  |  98  |  19<H>  ----------------------------<  102<H>  4.2   |  35<H>  |  0.66    Ca    9.3      15 Oct 2022 11:40    TPro  7.6  /  Alb  2.9<L>  /  TBili  0.5  /  DBili  x   /  AST  28  /  ALT  15  /  AlkPhos  118  10-15    PT/INR - ( 15 Oct 2022 11:40 )   PT: 12.2 sec;   INR: 1.02 ratio         PTT - ( 15 Oct 2022 11:40 )  PTT:30.6 sec    LIVER FUNCTIONS - ( 15 Oct 2022 11:40 )  Alb: 2.9 g/dL / Pro: 7.6 g/dL / ALK PHOS: 118 U/L / ALT: 15 U/L DA / AST: 28 U/L / GGT: x

## 2022-10-15 NOTE — H&P ADULT - ASSESSMENT
89 year old female, from home, hard of hearing, lives alone,  with PMHx of arthritis, Depression, scoliosis, dementia presents after a fall, found to have right hip dislocation.  89 year old female, from home, hard of hearing, lives alone,  with PMHx of arthritis, Depression, scoliosis, dementia presents after a fall, found to have right hip dislocation.

## 2022-10-15 NOTE — H&P ADULT - NSHPPHYSICALEXAM_GEN_ALL_CORE
PHYSICAL EXAMINATION:  GENERAL: NAD,   HEAD:  Atraumatic, Normocephalic  EYES:  conjunctiva and sclera clear  NECK: Supple, No JVD, Normal thyroid  CHEST/LUNG: Clear to auscultation. Clear to percussion bilaterally; No rales, rhonchi, wheezing, or rubs  HEART: Regular rate and rhythm; No murmurs, rubs, or gallops  ABDOMEN: Soft, Nontender, Nondistended; Bowel sounds present  NERVOUS SYSTEM:  Alert & Oriented X 2, no focal deficits    EXTREMITIES: right hip tender, shortened and internally rotated   SKIN: warm dry

## 2022-10-15 NOTE — H&P ADULT - PROBLEM SELECTOR PLAN 1
- presents after a mechanical fall   - XR shows right hip dislocated   - Ortho Consulted By ED  - Will keep NPO for possible surgery  - f/u Ortho reccs  - RCRI: Class II (6% 30 day risk of death or MI)

## 2022-10-15 NOTE — H&P ADULT - PROBLEM SELECTOR PLAN 3
hx of dementia, A/O x2, answering questions properly   not on any medications   SW consult as pt lives alone

## 2022-10-15 NOTE — ED ADULT NURSE REASSESSMENT NOTE - NS ED NURSE REASSESS COMMENT FT1
Patient alert and verbal with confusion. Required constant verbal redirection. Pt noted constantly attempting pull out peripheral IV access and Mcgill catheter. No signs of pain noted.

## 2022-10-15 NOTE — H&P ADULT - PROBLEM SELECTOR PLAN 2
- presents after a mechanical fall, no LOC  - CTH: tiny indeterminate lacunar infarcts, CT spine multiple degenerative changes  - no focal deficits  - SW consult as pt lives alone

## 2022-10-15 NOTE — ED ADULT NURSE NOTE - OBJECTIVE STATEMENT
pt is a 89 y/o  female  with c/o fall last night and  c/o left hip pain   pt w/ rt leg  shortening noted and rt leg  rotation. pt  unable to move the  rt leg. pt is a 89 y/o  female  with c/o fall last night and  c/o left hip pain   pt w/ rt leg  shortening noted and rt leg  rotation. pt  unable to move the  rt leg, discoloration to b/l lower legs, multiple bruises to b/l arms and legs, difficulty hearing, a/ox2 with forgetful,

## 2022-10-15 NOTE — ED ADULT NURSE NOTE - NS ED NURSE REPORT GIVEN DT
Cataract symptoms i.e., glare, blur discussed. Pt to call if worsening vision or trouble with driving, TV, reading, ADL. UV precautions. Reviewed possibility of future cataract surgery. 16-Oct-2022 20:14

## 2022-10-16 LAB
A1C WITH ESTIMATED AVERAGE GLUCOSE RESULT: 5.7 % — HIGH (ref 4–5.6)
ALBUMIN SERPL ELPH-MCNC: 2.7 G/DL — LOW (ref 3.5–5)
ALP SERPL-CCNC: 99 U/L — SIGNIFICANT CHANGE UP (ref 40–120)
ALT FLD-CCNC: 18 U/L DA — SIGNIFICANT CHANGE UP (ref 10–60)
ANION GAP SERPL CALC-SCNC: 6 MMOL/L — SIGNIFICANT CHANGE UP (ref 5–17)
APPEARANCE UR: CLEAR — SIGNIFICANT CHANGE UP
APTT BLD: 28.1 SEC — SIGNIFICANT CHANGE UP (ref 27.5–35.5)
AST SERPL-CCNC: 45 U/L — HIGH (ref 10–40)
BACTERIA # UR AUTO: ABNORMAL /HPF
BASOPHILS # BLD AUTO: 0.08 K/UL — SIGNIFICANT CHANGE UP (ref 0–0.2)
BASOPHILS NFR BLD AUTO: 0.6 % — SIGNIFICANT CHANGE UP (ref 0–2)
BILIRUB SERPL-MCNC: 0.7 MG/DL — SIGNIFICANT CHANGE UP (ref 0.2–1.2)
BILIRUB UR-MCNC: NEGATIVE — SIGNIFICANT CHANGE UP
BUN SERPL-MCNC: 21 MG/DL — HIGH (ref 7–18)
CALCIUM SERPL-MCNC: 8.8 MG/DL — SIGNIFICANT CHANGE UP (ref 8.4–10.5)
CHLORIDE SERPL-SCNC: 104 MMOL/L — SIGNIFICANT CHANGE UP (ref 96–108)
CHOLEST SERPL-MCNC: 144 MG/DL — SIGNIFICANT CHANGE UP
CO2 SERPL-SCNC: 30 MMOL/L — SIGNIFICANT CHANGE UP (ref 22–31)
COLOR SPEC: YELLOW — SIGNIFICANT CHANGE UP
COMMENT - URINE: SIGNIFICANT CHANGE UP
CREAT SERPL-MCNC: 0.66 MG/DL — SIGNIFICANT CHANGE UP (ref 0.5–1.3)
DIFF PNL FLD: ABNORMAL
EGFR: 81 ML/MIN/1.73M2 — SIGNIFICANT CHANGE UP
EOSINOPHIL # BLD AUTO: 0.01 K/UL — SIGNIFICANT CHANGE UP (ref 0–0.5)
EOSINOPHIL NFR BLD AUTO: 0.1 % — SIGNIFICANT CHANGE UP (ref 0–6)
EPI CELLS # UR: SIGNIFICANT CHANGE UP /HPF
ESTIMATED AVERAGE GLUCOSE: 117 MG/DL — HIGH (ref 68–114)
GLUCOSE SERPL-MCNC: 105 MG/DL — HIGH (ref 70–99)
GLUCOSE UR QL: NEGATIVE — SIGNIFICANT CHANGE UP
HCT VFR BLD CALC: 35.9 % — SIGNIFICANT CHANGE UP (ref 34.5–45)
HDLC SERPL-MCNC: 50 MG/DL — LOW
HGB BLD-MCNC: 11.5 G/DL — SIGNIFICANT CHANGE UP (ref 11.5–15.5)
IMM GRANULOCYTES NFR BLD AUTO: 0.4 % — SIGNIFICANT CHANGE UP (ref 0–0.9)
INR BLD: 1.08 RATIO — SIGNIFICANT CHANGE UP (ref 0.88–1.16)
KETONES UR-MCNC: ABNORMAL
LEUKOCYTE ESTERASE UR-ACNC: ABNORMAL
LIPID PNL WITH DIRECT LDL SERPL: 80 MG/DL — SIGNIFICANT CHANGE UP
LYMPHOCYTES # BLD AUTO: 16.8 % — SIGNIFICANT CHANGE UP (ref 13–44)
LYMPHOCYTES # BLD AUTO: 2.37 K/UL — SIGNIFICANT CHANGE UP (ref 1–3.3)
MAGNESIUM SERPL-MCNC: 1.9 MG/DL — SIGNIFICANT CHANGE UP (ref 1.6–2.6)
MCHC RBC-ENTMCNC: 31.8 PG — SIGNIFICANT CHANGE UP (ref 27–34)
MCHC RBC-ENTMCNC: 32 GM/DL — SIGNIFICANT CHANGE UP (ref 32–36)
MCV RBC AUTO: 99.2 FL — SIGNIFICANT CHANGE UP (ref 80–100)
MONOCYTES # BLD AUTO: 1.05 K/UL — HIGH (ref 0–0.9)
MONOCYTES NFR BLD AUTO: 7.4 % — SIGNIFICANT CHANGE UP (ref 2–14)
NEUTROPHILS # BLD AUTO: 10.54 K/UL — HIGH (ref 1.8–7.4)
NEUTROPHILS NFR BLD AUTO: 74.7 % — SIGNIFICANT CHANGE UP (ref 43–77)
NITRITE UR-MCNC: POSITIVE
NON HDL CHOLESTEROL: 94 MG/DL — SIGNIFICANT CHANGE UP
NRBC # BLD: 0 /100 WBCS — SIGNIFICANT CHANGE UP (ref 0–0)
PH UR: 5 — SIGNIFICANT CHANGE UP (ref 5–8)
PHOSPHATE SERPL-MCNC: 3.4 MG/DL — SIGNIFICANT CHANGE UP (ref 2.5–4.5)
PLATELET # BLD AUTO: 223 K/UL — SIGNIFICANT CHANGE UP (ref 150–400)
POTASSIUM SERPL-MCNC: 4 MMOL/L — SIGNIFICANT CHANGE UP (ref 3.5–5.3)
POTASSIUM SERPL-SCNC: 4 MMOL/L — SIGNIFICANT CHANGE UP (ref 3.5–5.3)
PROT SERPL-MCNC: 7.4 G/DL — SIGNIFICANT CHANGE UP (ref 6–8.3)
PROT UR-MCNC: 100
PROTHROM AB SERPL-ACNC: 12.9 SEC — SIGNIFICANT CHANGE UP (ref 10.5–13.4)
RBC # BLD: 3.62 M/UL — LOW (ref 3.8–5.2)
RBC # FLD: 12.2 % — SIGNIFICANT CHANGE UP (ref 10.3–14.5)
RBC CASTS # UR COMP ASSIST: ABNORMAL /HPF (ref 0–2)
SODIUM SERPL-SCNC: 140 MMOL/L — SIGNIFICANT CHANGE UP (ref 135–145)
SP GR SPEC: 1.02 — SIGNIFICANT CHANGE UP (ref 1.01–1.02)
TRIGL SERPL-MCNC: 69 MG/DL — SIGNIFICANT CHANGE UP
TSH SERPL-MCNC: 2.66 UU/ML — SIGNIFICANT CHANGE UP (ref 0.34–4.82)
UROBILINOGEN FLD QL: 1
WBC # BLD: 14.1 K/UL — HIGH (ref 3.8–10.5)
WBC # FLD AUTO: 14.1 K/UL — HIGH (ref 3.8–10.5)
WBC UR QL: >50 /HPF (ref 0–5)

## 2022-10-16 RX ORDER — HEPARIN SODIUM 5000 [USP'U]/ML
5000 INJECTION INTRAVENOUS; SUBCUTANEOUS EVERY 12 HOURS
Refills: 0 | Status: DISCONTINUED | OUTPATIENT
Start: 2022-10-16 | End: 2022-10-20

## 2022-10-16 RX ORDER — CEFTRIAXONE 500 MG/1
1000 INJECTION, POWDER, FOR SOLUTION INTRAMUSCULAR; INTRAVENOUS EVERY 24 HOURS
Refills: 0 | Status: COMPLETED | OUTPATIENT
Start: 2022-10-16 | End: 2022-10-18

## 2022-10-16 RX ORDER — PANTOPRAZOLE SODIUM 20 MG/1
40 TABLET, DELAYED RELEASE ORAL
Refills: 0 | Status: DISCONTINUED | OUTPATIENT
Start: 2022-10-16 | End: 2022-10-27

## 2022-10-16 RX ADMIN — LIDOCAINE 1 PATCH: 4 CREAM TOPICAL at 19:45

## 2022-10-16 RX ADMIN — HEPARIN SODIUM 5000 UNIT(S): 5000 INJECTION INTRAVENOUS; SUBCUTANEOUS at 18:18

## 2022-10-16 RX ADMIN — CEFTRIAXONE 100 MILLIGRAM(S): 500 INJECTION, POWDER, FOR SOLUTION INTRAMUSCULAR; INTRAVENOUS at 18:17

## 2022-10-16 RX ADMIN — LIDOCAINE 1 PATCH: 4 CREAM TOPICAL at 18:16

## 2022-10-16 RX ADMIN — Medication 15 MILLIGRAM(S): at 22:50

## 2022-10-16 RX ADMIN — Medication 15 MILLIGRAM(S): at 23:50

## 2022-10-16 NOTE — CONSULT NOTE ADULT - NS ATTEND AMEND GEN_ALL_CORE FT
pt evaluated.  agree with above.  will attempt to identify  of implant to get parts needed for surgery.  closed reduction may not be possible.  will follow.

## 2022-10-16 NOTE — CONSULT NOTE ADULT - SUBJECTIVE AND OBJECTIVE BOX
JUSTYNA LOMAX  MRN-[id number]     ORTHOPEDIC CONSULT    Diagnosis:  R DOMONIQUE dislocation    89 y/o D, from home, hard of hearing, lives alone,  with PMHx of arthritis, Depression, scoliosis, dementia presents after a fall. Patient is a poor historian although states she lost her balance and fell onto her right side "a few days ago" at home although was brought in by EMS yesterday.  Patient denies any head trauma or LOC but endorses right hip pain. Pt has a hx of R DOMONIQUE about 20 years ago with subsequently 4 dislocations afterwards- all requiring surgery to reduce- with the last one being 5 years ago as per patient. Pt states the surgeries were in "[place] and [place]" although does snot recall the surgeon. Pt states she has no family.   Pt states the right hip pain comes on with motion and alleviated with rest.    Denies any headaches, numbness, tingling, fever, chills, nausea,  vomiting chest pain, SOB, abdominal pain, or change in bowel habits.     In the ER:  Afebrile, hemodynamically stable   CTH: tiny indeterminate lacunar infarcts, CT spine multiple degenerative changes      Vital Signs Last 24 Hrs  T(C): 36.7 (16 Oct 2022 12:15), Max: 36.8 (15 Oct 2022 22:41)  T(F): 98 (16 Oct 2022 12:15), Max: 98.3 (15 Oct 2022 22:41)  HR: 97 (16 Oct 2022 12:15) (93 - 99)  BP: 129/75 (16 Oct 2022 12:15) (112/64 - 145/69)  BP(mean): --  RR: 18 (16 Oct 2022 12:15) (18 - 19)  SpO2: 99% (16 Oct 2022 12:15) (99% - 100%)    Parameters below as of 16 Oct 2022 12:15  Patient On (Oxygen Delivery Method): nasal cannula      I&O's Detail      Physical Exam:    General: A&Ox1,NAD, resting comfortably in bed.    R Hip:  leg is shortened flexed and internally rotated, Old lateral incision scar noted to be healed, (+) minimal ecchymosis noted laterally, Skin is pink and warm and intact. (+)TTP to hip region. Decreased ROM of the affected hip due to pain. SILT.   Lower extremity:  No calf tenderness, calves are soft. 2+pulses. NVI. (+)EHL/FHL/ADF/APF.  Good capillary refill. Warm, well-perfused. SILT.compartments soft/compressible.                           11.5   14.10 )-----------( 223      ( 16 Oct 2022 05:30 )             35.9     10-16    140  |  104  |  21<H>  ----------------------------<  105<H>  4.0   |  30  |  0.66    Ca    8.8      16 Oct 2022 05:30  Phos  3.4     10-16  Mg     1.9     10-16    TPro  7.4  /  Alb  2.7<L>  /  TBili  0.7  /  DBili  x   /  AST  45<H>  /  ALT  18  /  AlkPhos  99  10-16    PT/INR - ( 16 Oct 2022 05:30 )   PT: 12.9 sec;   INR: 1.08 ratio         PTT - ( 16 Oct 2022 05:30 )  PTT:28.1 sec  Urinalysis Basic - ( 16 Oct 2022 01:52 )    Color: Yellow / Appearance: Clear / S.020 / pH: x  Gluc: x / Ketone: Trace  / Bili: Negative / Urobili: 1   Blood: x / Protein: 100 / Nitrite: Positive   Leuk Esterase: Moderate / RBC: 25-50 /HPF / WBC >50 /HPF   Sq Epi: x / Non Sq Epi: Few /HPF / Bacteria: Many /HPF    XR right hip: right posterior dislocation of DOMONIQUE    Impression:  89 y/o Female with dislocation of R DOMONIQUE    Plan:  -  D/w Dr. Geronimo- Xrays reveal components of the constrained hip are dislocated- needs open reduction and revision surgery- will attempt to find components and more information regarding the implants, pt is NVI to the extremity without any compromise  -  Pain control  -  DVT prophylaxis  -  C/w NV checks to RLE  -  NWB to RLE  -  Case d/w Dr. Geronimo and medical team

## 2022-10-16 NOTE — PATIENT PROFILE ADULT - NSPROPTRIGHTBILLOFRIGHTS_GEN_A_NUR
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 Patient endorses weekly use of alcohol, with his last use being on 19. He denies any other pattern of other substance use at this time. Patient denies current withdrawal symptoms or concerns. Patient shows no physical signs or symptoms of intoxication or withdrawal. He does not appear to be at risk of intoxication/ withdrawal at this time. Patient is oriented x4.     Problem: Patient reports date of last use of methamphetamine to be 19, and last use of cannabis to be 19.  Goal: Maintain abstinence  Must be reached to complete treatment? Yes  Methods/Strategies (must include amount and frequency):   1. Attend group Monday, & Wednesday or Friday 9:30am-12:30pm.   2. Share thoughts, feelings, and urges to use.   Target Date: 3/13/20  Completion Date:       Dimension 2: Biomedical Conditions/Complications, Risk level: 0  Problem Statement from Comprehensive Assessment:  Patient denies health issues or concerns. He reports that he is unsure if he has a PCP at this time, but does have insurance and so appears able to get his medical needs met and addressed as necessary. Patient denies immediate medical needs or concerns. He appears to be in adequate physical health and functioning at this time.       Problem: Patient reports no current health issues or concerns.  Goal: Maintain stable physical health and functioning.  Must be reached to complete treatment?  yes  Methods/Strategies (must include amount and frequency):   1 Obtain a PCP.  2. Follow recommendations from your personal care provider regarding medical concerns.   3. Inform staff immediately of any changes in your health that may affect your active participation in group therapy or attendance.   Target Date: 3/13/20  Completion Date:     Problem: Patient reports current tobacco use.   Goal: Patient to receive information about smoking cessation.   Must be reached to complete treatment? No  Methods/Strategies (must include amount and frequency):   1. Staff to provide patient with nicotine cessation information and help on how to quit use.   2. Patient to report any progress on stopping nicotine use to staff.   Target Date: 3/13/20  Completion Date: 9/24/2019:  Patient has verbalized that he has no desire to stop smoking cigarettes at this time. Reports smoking daily.      Dimension 3: Emotional/Behavioral/Cognitive, Risk level: 2  Problem Statement from Comprehensive Assessment:  Patient reports mental health diagnoses of ADHD, OCD, and depression. He denies current mental health services or medications. His mental health does not appear stable or managed at this time. Patient endorses using meth due to not having medication for his ADHD. Patient does appear to struggle with impulse control as evidence by his substance use despite being on probation, as well as his endorsed problems with gambling. Patient was scheduled for mental health assessment at [facility] on 11/21/19, but he did not show. He will be rescheduled for assessment as soon as a therapist is available. Patient denies any suicidal or homicidal thoughts or plans. Patient is oriented x4.     Problem: Patient has  ADHD, OCD, and depression diagnoses   Goal: Manage mental health symptoms   Must be reached to complete treatment? Yes  Methods/Strategies (must include amount and frequency):   1. Patient to obtain mental health services  as appropriate (therapy, psychiatry, etc).  2. Patient to take all mental health medication as prescribed.  3. Patient to report any new or worsening mental health symptoms or concerns to counselor immediately.   Target Date: 3/13/20  Completion Date:     Dimension 4: Readiness to Change, Risk level 1  Problem Statement from Comprehensive Assessment: . Patient reports he is here due to probation. He states that he does feel that he is required to be here, and he does not feel that his use is a problem. Patient verbalizes that he is unsure if he will be able to stop using despite being in treatment. He does not appear genuinely motivated for treatment or change at this time. Patient verbalizes that he is not willing to follow recommendations at this time if he is referred to a higher level of care, although did verbalize understanding that a referral to a higher level of care would be imminent if he continued to use. He appears to lack motivation for treatment and change at this time, and is in treatment solely due to probation.    Problem: Patient reports he is here fore treatment due to probation.   Goal: Follow through with intentions to treat chemical dependency concerns while meeting OP treatment expectations in order to graduate successfully from the program.   Must be reached to complete treatment? Yes   Methods/Strategies (must include amount and frequency):   1. Complete all requested assignments.   2. If for any reason you will not be in group or will be tardy please contact staff at [phone])  Target Date: 3/13/20  Completion Date:     Dimension 5: Relapse/Continued Use/Continued Problem Potential, Risk level: 3  Problem Statement from Comprehensive Assessment:  Patient reports active daily use of meth prior to treatment. He therefore appears to lack the necessary skills to arrest use and prevent relapse without support and structure. Patient reports some insight into his use and the impact it has  had on his life, including probation. Patient reported last use  On 10/1/2019; indicates persistent risk of relapse if no structure. Patient does appear to have some knowledge of addiction and recovery as he endorses numerous treatments within the past year. It does not appear that he utilizes that knowledge to help maintain sobriety at this time.     Problem: Patient lacks skills to maintain sobriety.   Goal: Patient to gain skills to help prevent relapse.   Must be reached to complete treatment? Yes  Methods/Strategies (must include amount and frequency):   1. Patient to share in daily check-in any urges and addictive thinking to better understand his pattern of use and to prevent relapse in the future.   2. Patient to learn and implement at least personal coping strategies to manage urges to use.  Target Date: 3/13/20  Completion Date:       Dimension 6: Recovery Environment, Risk level: 3  Problem Statement from Comprehensive Assessment:  Patient is currently unemployed and so lacks structured and meaningful activity for his daily life. Patient reports a stable place to live at this time. However, he it appears that he needs more support in his living environment, as his pregnant girlfriend used occasionally until close to the time of giving birth. Patient reports that he does not currently have any support in his life for his sobriety. Patient is currently on probation for terroristic threats.      Problem: Lack of sober support and structure.   Goal: Gain sober support and structure.   Must be reached to complete treatment? yes  Methods/Strategies (must include amount and frequency):   1. Explore and identify sober support meetings to regularly attend.   2. Obtain a sponsor prior to phase II of DOP.   3. Patient to develop weekly schedule of activities, and include other activities that you think will benefit your recovery. Examples could be scheduled time with family, working out, or other activities that you  find enjoyable.  Target Date: 3/13/20  Completion Date:     Problem: On probation for a terroristic threats charge   Goal: Comply with probation expectations.   Must be reached to complete treatment? Yes   Methods/Strategies (must include amount and frequency):   1. Attend all scheduled meetings with PO.   2. Comply with all UA requests.  3. Inform counselor immediately of any changes or updates to probation.   Target Date: 3/13/20  Completion Date:        Resources  Resources to which the patient is being referred for problems when problems are to be addressed concurrently by another provider: any providers as identified throughout treatment.       By signing this document, I am acknowledging that I was actively and directly involved in the development of my treatment plan.           Patient  Signature_________________________________________         Date__________________        Staff Signature  Roberto Richter    Date: 12/10/2019, 11:36 AM     done by admissions office/patient

## 2022-10-16 NOTE — PATIENT PROFILE ADULT - FUNCTIONAL ASSESSMENT - BASIC MOBILITY 6.
4-calculated by average/Not able to assess (calculate score using [facility] averaging method)

## 2022-10-16 NOTE — PROGRESS NOTE ADULT - SUBJECTIVE AND OBJECTIVE BOX
Patient is a 89y old  Female who presents with a chief complaint of S/P fall with hip dislocation (16 Oct 2022 13:11)    pt seen in ed [ x ], reg med floor [   ], bed [ x ], chair at bedside [   ], awake and responsive [ x ], lethargic [  ],  nad [  ]            Allergies    No Known Allergies        Vitals    T(F): 98 (10-16-22 @ 12:15), Max: 98.3 (10-15-22 @ 22:41)  HR: 97 (10-16-22 @ 12:15) (93 - 99)  BP: 129/75 (10-16-22 @ 12:15) (112/64 - 145/69)  RR: 18 (10-16-22 @ 12:15) (18 - 19)  SpO2: 99% (10-16-22 @ 12:15) (99% - 100%)  Wt(kg): --  CAPILLARY BLOOD GLUCOSE          Labs                          11.5   14.10 )-----------( 223      ( 16 Oct 2022 05:30 )             35.9       10-16    140  |  104  |  21<H>  ----------------------------<  105<H>  4.0   |  30  |  0.66    Ca    8.8      16 Oct 2022 05:30  Phos  3.4     10-16  Mg     1.9     10-16    TPro  7.4  /  Alb  2.7<L>  /  TBili  0.7  /  DBili  x   /  AST  45<H>  /  ALT  18  /  AlkPhos  99  10-16                Radiology Results      Meds    MEDICATIONS  (STANDING):  cefTRIAXone   IVPB 1000 milliGRAM(s) IV Intermittent every 24 hours  heparin   Injectable 5000 Unit(s) SubCutaneous every 12 hours  lidocaine   4% Patch 1 Patch Transdermal every 24 hours  pantoprazole    Tablet 40 milliGRAM(s) Oral before breakfast      MEDICATIONS  (PRN):  ketorolac   Injectable 15 milliGRAM(s) IV Push every 6 hours PRN Moderate Pain (4 - 6)  morphine  - Injectable 1 milliGRAM(s) IV Push once PRN Severe Pain (7 - 10)      Physical Exam    Neuro :  no focal deficits  Respiratory: CTA B/L  CV: RRR, S1S2, no murmurs,   Abdominal: Soft, NT, ND +BS,  Extremities: No edema, + peripheral pulses, right lower extrem shortened and internally rotated    ASSESSMENT    right posterior dislocation of DOMONIQUE  s/p fall,   uti  h/o arthritis,   Depression,   scoliosis,   dementia         PLAN    ortho cons noted  needs open reduction and revision surgery- will attempt to find components and more information regarding the implants,   pt is NVI to the extremity without any compromise  DVT prophylaxis  C/w NV checks to RLE  NWB to RLE  cont lidocaine patch, toradol prn,   pain mgmt eval  cardio cons noted  f/u echo   rocephin 1 gram daily  f/u cx   cont current meds

## 2022-10-16 NOTE — PATIENT PROFILE ADULT - FALL HARM RISK - HARM RISK INTERVENTIONS
There are no Wet Read(s) to document. Assistance OOB with selected safe patient handling equipment/Communicate Risk of Fall with Harm to all staff/Discuss with provider need for PT consult/Monitor gait and stability/Provide patient with walking aids - walker, cane, crutches/Reinforce activity limits and safety measures with patient and family/Tailored Fall Risk Interventions/Use of alarms - bed, chair and/or voice tab/Visual Cue: Yellow wristband and red socks/Bed in lowest position, wheels locked, appropriate side rails in place/Call bell, personal items and telephone in reach/Instruct patient to call for assistance before getting out of bed or chair/Non-slip footwear when patient is out of bed/[place] to call system/Physically safe environment - no spills, clutter or unnecessary equipment/Purposeful Proactive Rounding/Room/bathroom lighting operational, light cord in reach

## 2022-10-16 NOTE — CONSULT NOTE ADULT - SUBJECTIVE AND OBJECTIVE BOX
CHIEF COMPLAINT:Patient is a 89y old  Female who presents with a chief complaint of fall and hip pain.    HPI:   89 year old female, from home, hard of hearing, lives alone,  with PMHx of arthritis, Depression, scoliosis, dementia presents after a fall. Patient states that she slipped and fell landing on her right side. Patient denies any head trauma or LOC but endorses right hip pain. Denies any headaches, numbness, tingling, fever, chills, nausea,  vomiting chest pain, SOB, abdominal pain, or change in bowel habits.     In the ED:  Afebrile, hemodynamically stable   CTH: tiny indeterminate lacunar infarcts, CT spine multiple degenerative changes  Xray hip: dislocation right hip   (15 Oct 2022 18:35)      PAST MEDICAL & SURGICAL HISTORY:  Arthritis      S/P Rt hip replacement          MEDICATIONS  (STANDING):  lidocaine   4% Patch 1 Patch Transdermal every 24 hours    MEDICATIONS  (PRN):  ketorolac   Injectable 15 milliGRAM(s) IV Push every 6 hours PRN Moderate Pain (4 - 6)  morphine  - Injectable 1 milliGRAM(s) IV Push once PRN Severe Pain (7 - 10)      FAMILY HISTORY:no hx of CAD      SOCIAL HISTORY:    [x ] Non-smoker    [ x] Alcohol-denies    Allergies    No Known Allergies    Intolerances    	    REVIEW OF SYSTEMS:  CONSTITUTIONAL: No fever, weight loss, or fatigue  EYES: No eye pain, visual disturbances, or discharge  ENT:  No difficulty hearing, tinnitus, vertigo; No sinus or throat pain  NECK: No pain or stiffness  RESPIRATORY: No cough, wheezing, chills or hemoptysis; No Shortness of Breath  CARDIOVASCULAR: No chest pain, palpitations, passing out, dizziness, or leg swelling  GASTROINTESTINAL: No abdominal or epigastric pain. No nausea, vomiting, or hematemesis; No diarrhea or constipation. No melena or hematochezia.  GENITOURINARY: No dysuria, frequency, hematuria, or incontinence  NEUROLOGICAL: No headaches, memory loss, loss of strength, numbness, or tremors  SKIN: No itching, burning, rashes, or lesions   LYMPH Nodes: No enlarged glands  ENDOCRINE: No heat or cold intolerance; No hair loss  MUSCULOSKELETAL: No joint pain or swelling; No muscle, +extremity pain  PSYCHIATRIC: No depression, anxiety, mood swings, or difficulty sleeping  HEME/LYMPH: No easy bruising, or bleeding gums  ALLERGY AND IMMUNOLOGIC: No hives or eczema	      PHYSICAL EXAM:  T(C): 36.7 (10-16-22 @ 12:15), Max: 36.8 (10-15-22 @ 22:41)  HR: 97 (10-16-22 @ 12:15) (93 - 99)  BP: 129/75 (10-16-22 @ 12:15) (112/64 - 145/69)  RR: 18 (10-16-22 @ 12:15) (18 - 19)  SpO2: 99% (10-16-22 @ 12:15) (99% - 100%)  Wt(kg): --  I&O's Summary      Appearance: Normal	  HEENT:   Normal oral mucosa, PERRL, EOMI	  Lymphatic: No lymphadenopathy  Cardiovascular: Normal S1 S2, No JVD, No murmurs, No edema  Respiratory: Lungs clear to auscultation	  Gastrointestinal:  Soft, Non-tender, + BS	  Skin: No rashes, No ecchymoses, No cyanosis	  Neurologic: Non-focal  Extremities: Normal range of motion, No clubbing, cyanosis or edema  Vascular: Peripheral pulses palpable 2+ bilaterally        ECG:  	nsr,lae,t wave inversion v1 and v2  	  	  LABS:	 	                        11.5   14.10 )-----------( 223      ( 16 Oct 2022 05:30 )             35.9     10-16    140  |  104  |  21<H>  ----------------------------<  105<H>  4.0   |  30  |  0.66    Ca    8.8      16 Oct 2022 05:30  Phos  3.4     10-16  Mg     1.9     10-16    TPro  7.4  /  Alb  2.7<L>  /  TBili  0.7  /  DBili  x   /  AST  45<H>  /  ALT  18  /  AlkPhos  99  10-16    proBNP:   Lipid Profile: Cholesterol 144  LDL --  HDL 50  TG 69  ldl calc 80  Ratio --    HgA1c:   TSH: Thyroid Stimulating Hormone, Serum: 2.66 uU/mL (10-16 @ 05:30)    < from: CT Head No Cont (10.15.22 @ 11:39) >  ACC: [id number] EXAM:  CT CERVICAL SPINE                        ACC: [id number] EXAM:  CT BRAIN                          PROCEDURE DATE:  10/15/2022          INTERPRETATION:  CT head without IV contrast    CLINICAL INFORMATION: Intracranial hemorrhage.    TECHNIQUE: Contiguous axial 5 mm sections were obtained through the head.   Sagittal and coronal 2-D reformatted images were also obtained.   This   scan was performed using automatic exposure control (radiation dose   reduction software) to obtain a diagnostic image quality scan with   patient dose as low as reasonably achievable.    FINDINGS:   No previous examinations are available for review.    The brain demonstrates minimal anterior periventricular white matter   ischemia. Tiny indeterminate lacunar infarction in the anterior limb of   the LEFT internal capsule.   No acute cerebral cortical infarct is seen.    No intracranial hemorrhage is found.  No mass effect is found in the   brain.    The ventricles, sulci and basal cisterns demonstrate mild to moderate   cerebellar and temporal lobe atrophy    The orbits are unremarkable.  The paranasal sinuses are clear.  The nasal   cavity appears intact.  The nasopharynx is symmetric.  The central skull   base, petrous temporal bones andcalvarium remain intact.        CT cervical spine without IV contrast    CLINICAL INFORMATION: Fracture, trauma, neck pain.  Neck pain, spinal   stenosis, spondylosis. HCTall    TECHNIQUE:  Contiguous axial 2.0 mm sections were obtained through the   cervical spine using a single helical acquisition.  Additional 2 mm   sagittal and coronal reformatted reconstructions of the spine were   obtained.  These additional reformatted images were used to evaluate the   spine for alignment, vertebral fractures and the integrity of the the   posterior elements.   This scan was performed using automatic exposure   control (radiation dose reduction software) to obtain a diagnostic image   quality scan with patient dose as low as reasonably achievable.    FINDINGS:   No prior similar studies are available for review    Cervical vertebral body heights are maintained. No vertebral fracture is   seen. No destructive bone lesion is found.  Alignment is significant for   straightening with grade 1 anterior spondylolisthesis at C2-3, C3-4 and   C7-T1 on a degenerative basis.  Facet joints appear intact and aligned.    Cervical intervertebral disc spaces show advanced degenerative disc   disease and spondylosis at C3-4 C7-T1 with loss of disc height and   associated degenerative endplate changes. There is narrowing of the   BILATERAL C2-3 through C6-7 neural foramina due to uncovertebral spurring   and facet osteophytic hypertrophy. Degenerative cord impingement is seen   at C4-5 through C6-7 due to posterior osteophytic ridge/disc complexes.    The skull base appears intact.  No neck mass is recognized.  Paraspinal   soft tissues appear intact. Visualized lymph nodes appear to be within   physiologic size limits.    Interstitial fibrosis is seen at the RIGHT lung apex.      IMPRESSION:    CT HEAD: Mild periventricular white matter ischemia.Tiny indeterminate   lacunar infarction in the anterior limb of the LEFT internal capsule.   Mild to moderate cerebellar and temporal lobe atrophy.    CT cervical spine:   No vertebral fracture is recognized.  Straightening   with grade 1 anterior spondylolisthesis at C2-3, C3-4 and C7-T1 on a   degenerative basis.    Advanced degenerative disc disease and spondylosis   at C3-4 C7-T1 with narrowing of the BILATERAL C2-3 through C6-7 neural   foramina due to uncovertebral spurring and facet osteophytic hypertrophy.   Degenerative cord impingement is seen at C4-5 through C6-7 due to   posterior osteophytic ridge/disc complexes.      < end of copied text >  < from: Xray Chest 1 View- PORTABLE-Routine (Xray Chest 1 View- PORTABLE-Routine in AM.) (12.14.20 @ 10:52) >      PROCEDURE DATE:  12/14/2020          INTERPRETATION:  CLINICAL STATEMENT: Follow-up chest pain.    TECHNIQUE: AP view of the chest.    COMPARISON: 12/10/2020    FINDINGS/  IMPRESSION:  Mild increased interstitial lung markings without significant change. Atelectasis left lung base.    No pleural effusion    Heart size cannot be accurately assessed in this projection.          < end of copied text >  < from: Xray Pelvis and Hip, Right (10.01.08 @ 14:37) >  EXAM:  PELVIS & RIGHT HIP      PROCEDURE DATE:  10/01/2008    .    INTERPRETATION:  Serial views of the pelvis and right hip were obtained.    October 1, 2008, 1:35 p.m. Comparison is made to prior study of   03/29/2008.       The patient is statuspost right total hip replacement. There is   posterosuperior displacement of the femoral prosthesis relative to the   acetabular component.  The visualized soft tissues are unremarkable.      10/01/2008, 2:33 p.m.    There has been interval reductionof previously described right hip   prosthesis dislocation.          IMPRESSION:    Dislocation of right hip prosthesis, with subsequent   reduction.    < end of copied text >

## 2022-10-17 LAB
ANION GAP SERPL CALC-SCNC: 9 MMOL/L — SIGNIFICANT CHANGE UP (ref 5–17)
BASOPHILS # BLD AUTO: 0.05 K/UL — SIGNIFICANT CHANGE UP (ref 0–0.2)
BASOPHILS NFR BLD AUTO: 0.5 % — SIGNIFICANT CHANGE UP (ref 0–2)
BUN SERPL-MCNC: 31 MG/DL — HIGH (ref 7–18)
CALCIUM SERPL-MCNC: 9 MG/DL — SIGNIFICANT CHANGE UP (ref 8.4–10.5)
CHLORIDE SERPL-SCNC: 103 MMOL/L — SIGNIFICANT CHANGE UP (ref 96–108)
CO2 SERPL-SCNC: 31 MMOL/L — SIGNIFICANT CHANGE UP (ref 22–31)
CREAT SERPL-MCNC: 0.67 MG/DL — SIGNIFICANT CHANGE UP (ref 0.5–1.3)
EGFR: 80 ML/MIN/1.73M2 — SIGNIFICANT CHANGE UP
EOSINOPHIL # BLD AUTO: 0.03 K/UL — SIGNIFICANT CHANGE UP (ref 0–0.5)
EOSINOPHIL NFR BLD AUTO: 0.3 % — SIGNIFICANT CHANGE UP (ref 0–6)
GLUCOSE SERPL-MCNC: 109 MG/DL — HIGH (ref 70–99)
HCT VFR BLD CALC: 35.4 % — SIGNIFICANT CHANGE UP (ref 34.5–45)
HGB BLD-MCNC: 11.2 G/DL — LOW (ref 11.5–15.5)
IMM GRANULOCYTES NFR BLD AUTO: 0.3 % — SIGNIFICANT CHANGE UP (ref 0–0.9)
LYMPHOCYTES # BLD AUTO: 1.37 K/UL — SIGNIFICANT CHANGE UP (ref 1–3.3)
LYMPHOCYTES # BLD AUTO: 12.4 % — LOW (ref 13–44)
MAGNESIUM SERPL-MCNC: 2.3 MG/DL — SIGNIFICANT CHANGE UP (ref 1.6–2.6)
MCHC RBC-ENTMCNC: 31.6 GM/DL — LOW (ref 32–36)
MCHC RBC-ENTMCNC: 31.7 PG — SIGNIFICANT CHANGE UP (ref 27–34)
MCV RBC AUTO: 100.3 FL — HIGH (ref 80–100)
MONOCYTES # BLD AUTO: 1 K/UL — HIGH (ref 0–0.9)
MONOCYTES NFR BLD AUTO: 9.1 % — SIGNIFICANT CHANGE UP (ref 2–14)
NEUTROPHILS # BLD AUTO: 8.54 K/UL — HIGH (ref 1.8–7.4)
NEUTROPHILS NFR BLD AUTO: 77.4 % — HIGH (ref 43–77)
NRBC # BLD: 0 /100 WBCS — SIGNIFICANT CHANGE UP (ref 0–0)
PHOSPHATE SERPL-MCNC: 3.5 MG/DL — SIGNIFICANT CHANGE UP (ref 2.5–4.5)
PLATELET # BLD AUTO: 191 K/UL — SIGNIFICANT CHANGE UP (ref 150–400)
POTASSIUM SERPL-MCNC: 3.9 MMOL/L — SIGNIFICANT CHANGE UP (ref 3.5–5.3)
POTASSIUM SERPL-SCNC: 3.9 MMOL/L — SIGNIFICANT CHANGE UP (ref 3.5–5.3)
RBC # BLD: 3.53 M/UL — LOW (ref 3.8–5.2)
RBC # FLD: 12.6 % — SIGNIFICANT CHANGE UP (ref 10.3–14.5)
SODIUM SERPL-SCNC: 143 MMOL/L — SIGNIFICANT CHANGE UP (ref 135–145)
WBC # BLD: 11.02 K/UL — HIGH (ref 3.8–10.5)
WBC # FLD AUTO: 11.02 K/UL — HIGH (ref 3.8–10.5)

## 2022-10-17 RX ORDER — SODIUM CHLORIDE 9 MG/ML
1000 INJECTION, SOLUTION INTRAVENOUS
Refills: 0 | Status: DISCONTINUED | OUTPATIENT
Start: 2022-10-17 | End: 2022-10-21

## 2022-10-17 RX ADMIN — Medication 15 MILLIGRAM(S): at 06:32

## 2022-10-17 RX ADMIN — HEPARIN SODIUM 5000 UNIT(S): 5000 INJECTION INTRAVENOUS; SUBCUTANEOUS at 05:57

## 2022-10-17 RX ADMIN — LIDOCAINE 1 PATCH: 4 CREAM TOPICAL at 18:27

## 2022-10-17 RX ADMIN — SODIUM CHLORIDE 50 MILLILITER(S): 9 INJECTION, SOLUTION INTRAVENOUS at 10:40

## 2022-10-17 RX ADMIN — Medication 15 MILLIGRAM(S): at 05:57

## 2022-10-17 RX ADMIN — Medication 15 MILLIGRAM(S): at 14:47

## 2022-10-17 RX ADMIN — CEFTRIAXONE 100 MILLIGRAM(S): 500 INJECTION, POWDER, FOR SOLUTION INTRAMUSCULAR; INTRAVENOUS at 18:27

## 2022-10-17 RX ADMIN — LIDOCAINE 1 PATCH: 4 CREAM TOPICAL at 06:01

## 2022-10-17 RX ADMIN — HEPARIN SODIUM 5000 UNIT(S): 5000 INJECTION INTRAVENOUS; SUBCUTANEOUS at 18:27

## 2022-10-17 RX ADMIN — LIDOCAINE 1 PATCH: 4 CREAM TOPICAL at 20:00

## 2022-10-17 RX ADMIN — Medication 15 MILLIGRAM(S): at 14:46

## 2022-10-17 NOTE — PROGRESS NOTE ADULT - PROBLEM SELECTOR PLAN 1
- presents after a mechanical fall   - XR shows right hip dislocated   - Ortho Consulted By ED  - f/u Ortho reccs, pending recs for open reduction. NWB to RLE  - RCRI: Class II (6% 30 day risk of death or MI)

## 2022-10-17 NOTE — PROGRESS NOTE ADULT - SUBJECTIVE AND OBJECTIVE BOX
PGY-2 Progress Note discussed with attending    CHIEF COMPLAINT & BRIEF HOSPITAL COURSE:  89 year old female, from home, hard of hearing, lives alone,  with PMHx of arthritis, Depression, scoliosis, dementia presents after a fall, found to have right hip dislocation.    INTERVAL HPI/OVERNIGHT EVENTS:   no acute events noted, pt without complaints    REVIEW OF SYSTEMS:  CONSTITUTIONAL: No fever, weight loss, or fatigue  RESPIRATORY: No cough, wheezing, chills or hemoptysis; No shortness of breath  CARDIOVASCULAR: No chest pain, palpitations, dizziness, or leg swelling  GASTROINTESTINAL: No abdominal pain. No nausea, vomiting, or hematemesis; No diarrhea or constipation. No melena or hematochezia.  GENITOURINARY: No dysuria or hematuria, urinary frequency  NEUROLOGICAL: No headaches, memory loss, loss of strength, numbness, or tremors  SKIN: No itching, burning, rashes, or lesions     MEDICATIONS  (STANDING):  cefTRIAXone   IVPB 1000 milliGRAM(s) IV Intermittent every 24 hours  dextrose 5% + sodium chloride 0.9%. 1000 milliLiter(s) (50 mL/Hr) IV Continuous <Continuous>  heparin   Injectable 5000 Unit(s) SubCutaneous every 12 hours  lidocaine   4% Patch 1 Patch Transdermal every 24 hours  pantoprazole    Tablet 40 milliGRAM(s) Oral before breakfast    MEDICATIONS  (PRN):  morphine  - Injectable 1 milliGRAM(s) IV Push once PRN Severe Pain (7 - 10)      Vital Signs Last 24 Hrs  T(C): 36.4 (17 Oct 2022 14:09), Max: 36.9 (16 Oct 2022 17:40)  T(F): 97.6 (17 Oct 2022 14:09), Max: 98.5 (16 Oct 2022 21:49)  HR: 95 (17 Oct 2022 14:09) (93 - 98)  BP: 131/63 (17 Oct 2022 14:09) (131/63 - 138/68)  BP(mean): --  RR: 16 (17 Oct 2022 14:09) (16 - 20)  SpO2: 99% (17 Oct 2022 14:09) (96% - 100%)    Parameters below as of 17 Oct 2022 14:09  Patient On (Oxygen Delivery Method): room air        PHYSICAL EXAMINATION:  GENERAL: NAD,   HEAD:  Atraumatic, Normocephalic  EYES:  conjunctiva and sclera clear  NECK: Supple, No JVD, Normal thyroid  CHEST/LUNG: Clear to auscultation. Clear to percussion bilaterally; No rales, rhonchi, wheezing, or rubs  HEART: Regular rate and rhythm; No murmurs, rubs, or gallops  ABDOMEN: Soft, Nontender, Nondistended; Bowel sounds present  NERVOUS SYSTEM:  Alert & Oriented X 2, no focal deficits    EXTREMITIES: right hip tender, shortened and internally rotated   SKIN: warm dry                          11.2   11.02 )-----------( 191      ( 17 Oct 2022 08:13 )             35.4     10-17    143  |  103  |  31<H>  ----------------------------<  109<H>  3.9   |  31  |  0.67    Ca    9.0      17 Oct 2022 08:13  Phos  3.5     10-17  Mg     2.3     10-17    TPro  7.4  /  Alb  2.7<L>  /  TBili  0.7  /  DBili  x   /  AST  45<H>  /  ALT  18  /  AlkPhos  99  10-16    LIVER FUNCTIONS - ( 16 Oct 2022 05:30 )  Alb: 2.7 g/dL / Pro: 7.4 g/dL / ALK PHOS: 99 U/L / ALT: 18 U/L DA / AST: 45 U/L / GGT: x               PT/INR - ( 16 Oct 2022 05:30 )   PT: 12.9 sec;   INR: 1.08 ratio         PTT - ( 16 Oct 2022 05:30 )  PTT:28.1 sec    I&O's Summary    16 Oct 2022 07:01  -  17 Oct 2022 07:00  --------------------------------------------------------  IN: 0 mL / OUT: 460 mL / NET: -460 mL            CAPILLARY BLOOD GLUCOSE      RADIOLOGY & ADDITIONAL TESTS:

## 2022-10-17 NOTE — PROGRESS NOTE ADULT - SUBJECTIVE AND OBJECTIVE BOX
CHIEF COMPLAINT:Patient is a 89y old  Female who presents with a chief complaint of hip dislocation.Pt appears comfortable.    	  REVIEW OF SYSTEMS:  CONSTITUTIONAL: No fever, weight loss, or fatigue  EYES: No eye pain, visual disturbances, or discharge  ENT:  No difficulty hearing, tinnitus, vertigo; No sinus or throat pain  NECK: No pain or stiffness  RESPIRATORY: No cough, wheezing, chills or hemoptysis; No Shortness of Breath  CARDIOVASCULAR: No chest pain, palpitations, passing out, dizziness, or leg swelling  GASTROINTESTINAL: No abdominal or epigastric pain. No nausea, vomiting, or hematemesis; No diarrhea or constipation. No melena or hematochezia.  GENITOURINARY: No dysuria, frequency, hematuria, or incontinence  NEUROLOGICAL: No headaches, memory loss, loss of strength, numbness, or tremors  SKIN: No itching, burning, rashes, or lesions   LYMPH Nodes: No enlarged glands  ENDOCRINE: No heat or cold intolerance; No hair loss  MUSCULOSKELETAL: No joint pain or swelling; No muscle, back, or extremity pain  PSYCHIATRIC: No depression, anxiety, mood swings, or difficulty sleeping  HEME/LYMPH: No easy bruising, or bleeding gums  ALLERGY AND IMMUNOLOGIC: No hives or eczema	      PHYSICAL EXAM:  T(C): 36.4 (10-17-22 @ 05:38), Max: 36.9 (10-16-22 @ 17:40)  HR: 93 (10-17-22 @ 05:38) (93 - 98)  BP: 135/71 (10-17-22 @ 05:38) (129/75 - 138/68)  RR: 18 (10-17-22 @ 05:38) (18 - 20)  SpO2: 98% (10-17-22 @ 05:38) (96% - 100%)  Wt(kg): --  I&O's Summary    16 Oct 2022 07:01  -  17 Oct 2022 07:00  --------------------------------------------------------  IN: 0 mL / OUT: 460 mL / NET: -460 mL        Appearance: Normal	  HEENT:   Normal oral mucosa, PERRL, EOMI	  Lymphatic: No lymphadenopathy  Cardiovascular: Normal S1 S2, No JVD, No murmurs, No edema  Respiratory: Lungs clear to auscultation	  Psychiatry: A & O x 3, Mood & affect appropriate  Gastrointestinal:  Soft, Non-tender, + BS	  Skin: No rashes, No ecchymoses, No cyanosis	  Neurologic: Non-focal  Extremities: Normal range of motion, No clubbing, cyanosis or edema  Vascular: Peripheral pulses palpable 2+ bilaterally    MEDICATIONS  (STANDING):  cefTRIAXone   IVPB 1000 milliGRAM(s) IV Intermittent every 24 hours  dextrose 5% + sodium chloride 0.9%. 1000 milliLiter(s) (50 mL/Hr) IV Continuous <Continuous>  heparin   Injectable 5000 Unit(s) SubCutaneous every 12 hours  lidocaine   4% Patch 1 Patch Transdermal every 24 hours  pantoprazole    Tablet 40 milliGRAM(s) Oral before breakfast    	  	  LABS:	 	                        11.2   11.02 )-----------( 191      ( 17 Oct 2022 08:13 )             35.4     10-17    143  |  103  |  31<H>  ----------------------------<  109<H>  3.9   |  31  |  0.67    Ca    9.0      17 Oct 2022 08:13  Phos  3.5     10-17  Mg     2.3     10-17    TPro  7.4  /  Alb  2.7<L>  /  TBili  0.7  /  DBili  x   /  AST  45<H>  /  ALT  18  /  AlkPhos  99  10-16    proBNP:   Lipid Profile: Cholesterol 144  LDL --  HDL 50  TG 69  Ldl calc 80  Ratio --    HgA1c:   TSH: Thyroid Stimulating Hormone, Serum: 2.66 uU/mL (10-16 @ 05:30)      	    ec< from: Transthoracic Echocardiogram (10.17.22 @ 08:23) >  OBSERVATIONS:  Mitral Valve: Mitral annular calcification. Trace mitral  regurgitation.  Aortic Root: Aortic Root: 3.5 cm.    Aortic Valve: Calcified trileaflet aortic valve with normal  opening.  Left Atrium: Normal left atrium.  LA volume index = 16  cc/m2.  Left Ventricle: Normal Left Ventricular Systolic Function,  (EF = 55 to 60%) Normal left ventricular internal  dimensions and wall thicknesses. Grade I diastolic  dysfunction (Impaired relaxation, mild).  Right Heart: Normal right atrium. Normal right ventricular  size and systolic function (TAPSE 2.2 cm). There is  mild-moderate tricuspid regurgitation. Pulmonic valve not  well seen.  Pericardium/PleuraNormal pericardium with no pericardial  effusion.  Hemodynamic: RV systolic pressure is moderately increased  at  48 mm Hg.    < end of copied text >

## 2022-10-17 NOTE — PROGRESS NOTE ADULT - PROBLEM SELECTOR PLAN 2
- presents after a mechanical fall, no LOC  - CTH: tiny indeterminate lacunar infarcts, CT spine multiple degenerative changes  - no focal deficits  - SW consult as pt lives alone    pt with UTI, UA positive  started on ceftriaxone, ucx pending

## 2022-10-17 NOTE — PROGRESS NOTE ADULT - SUBJECTIVE AND OBJECTIVE BOX
Patient is a 89y old  Female who presents with a chief complaint of hip dislocation (16 Oct 2022 15:44)    pt seen in ed [ x ], reg med floor [   ], bed [ x ], chair at bedside [   ], awake and responsive [ x ], lethargic [  ],    nad [  ]        Allergies    No Known Allergies        Vitals    T(F): 97.5 (10-17-22 @ 05:38), Max: 98.5 (10-16-22 @ 21:49)  HR: 93 (10-17-22 @ 05:38) (93 - 99)  BP: 135/71 (10-17-22 @ 05:38) (112/64 - 138/68)  RR: 18 (10-17-22 @ 05:38) (18 - 20)  SpO2: 98% (10-17-22 @ 05:38) (96% - 100%)  Wt(kg): --  CAPILLARY BLOOD GLUCOSE          Labs                          11.5   14.10 )-----------( 223      ( 16 Oct 2022 05:30 )             35.9       10-16    140  |  104  |  21<H>  ----------------------------<  105<H>  4.0   |  30  |  0.66    Ca    8.8      16 Oct 2022 05:30  Phos  3.4     10-16  Mg     1.9     10-16    TPro  7.4  /  Alb  2.7<L>  /  TBili  0.7  /  DBili  x   /  AST  45<H>  /  ALT  18  /  AlkPhos  99  10-16                Radiology Results      Meds    MEDICATIONS  (STANDING):  cefTRIAXone   IVPB 1000 milliGRAM(s) IV Intermittent every 24 hours  heparin   Injectable 5000 Unit(s) SubCutaneous every 12 hours  lidocaine   4% Patch 1 Patch Transdermal every 24 hours  pantoprazole    Tablet 40 milliGRAM(s) Oral before breakfast      MEDICATIONS  (PRN):  ketorolac   Injectable 15 milliGRAM(s) IV Push every 6 hours PRN Moderate Pain (4 - 6)  morphine  - Injectable 1 milliGRAM(s) IV Push once PRN Severe Pain (7 - 10)      Physical Exam    Neuro :  no focal deficits  Respiratory: CTA B/L  CV: RRR, S1S2, no murmurs,   Abdominal: Soft, NT, ND +BS,  Extremities: No edema, + peripheral pulses, right lower extrem shortened and internally rotated    ASSESSMENT    right posterior dislocation of DOMONIQUE  s/p fall,   uti  h/o arthritis,   Depression,   scoliosis,   dementia         PLAN    ortho cons noted  needs open reduction and revision surgery- will attempt to find components and more information regarding the implants,   pt is NVI to the extremity without any compromise  DVT prophylaxis  C/w NV checks to RLE  NWB to RLE  cont lidocaine patch, toradol prn,   pain mgmt eval  cardio cons noted  f/u echo   rocephin 1 gram daily  f/u cx   cont current meds           Patient is a 89y old  Female who presents with a chief complaint of hip dislocation (16 Oct 2022 15:44)    pt seen in ed [  ], reg med floor [ x  ], bed [ x ], chair at bedside [   ], awake and responsive [ x ], lethargic [  ],    nad [  ]        Allergies    No Known Allergies        Vitals    T(F): 97.5 (10-17-22 @ 05:38), Max: 98.5 (10-16-22 @ 21:49)  HR: 93 (10-17-22 @ 05:38) (93 - 99)  BP: 135/71 (10-17-22 @ 05:38) (112/64 - 138/68)  RR: 18 (10-17-22 @ 05:38) (18 - 20)  SpO2: 98% (10-17-22 @ 05:38) (96% - 100%)  Wt(kg): --  CAPILLARY BLOOD GLUCOSE          Labs                          11.5   14.10 )-----------( 223      ( 16 Oct 2022 05:30 )             35.9       10-16    140  |  104  |  21<H>  ----------------------------<  105<H>  4.0   |  30  |  0.66    Ca    8.8      16 Oct 2022 05:30  Phos  3.4     10-16  Mg     1.9     10-16    TPro  7.4  /  Alb  2.7<L>  /  TBili  0.7  /  DBili  x   /  AST  45<H>  /  ALT  18  /  AlkPhos  99  10-16                Radiology Results      Meds    MEDICATIONS  (STANDING):  cefTRIAXone   IVPB 1000 milliGRAM(s) IV Intermittent every 24 hours  heparin   Injectable 5000 Unit(s) SubCutaneous every 12 hours  lidocaine   4% Patch 1 Patch Transdermal every 24 hours  pantoprazole    Tablet 40 milliGRAM(s) Oral before breakfast      MEDICATIONS  (PRN):  ketorolac   Injectable 15 milliGRAM(s) IV Push every 6 hours PRN Moderate Pain (4 - 6)  morphine  - Injectable 1 milliGRAM(s) IV Push once PRN Severe Pain (7 - 10)      Physical Exam    Neuro :  no focal deficits  Respiratory: CTA B/L  CV: RRR, S1S2, no murmurs,   Abdominal: Soft, NT, ND +BS,  Extremities: No edema, + peripheral pulses, right lower extrem shortened and internally rotated    ASSESSMENT    right posterior dislocation of DOMONIQUE  s/p fall,   uti  h/o arthritis,   Depression,   scoliosis,   dementia         PLAN    ortho f/u   needs open reduction and revision surgery- will attempt to find components and more information regarding the implants,   pt is NVI to the extremity without any compromise  DVT prophylaxis  C/w NV checks to RLE  NWB to RLE  cont lidocaine patch, toradol prn,   pain mgmt eval  cardio f/u  f/u echo   rocephin 1 gram daily  f/u ucx   cont current meds

## 2022-10-17 NOTE — PROGRESS NOTE ADULT - ASSESSMENT
89 year old female, from home, hard of hearing, lives alone,  with PMHx of arthritis, Depression, scoliosis, dementia presents after a fall, found to have right hip dislocation.

## 2022-10-17 NOTE — PROGRESS NOTE ADULT - ASSESSMENT
89 year old female, from home, hard of hearing, lives alone,  with PMHx of arthritis, Depression, scoliosis, dementia presents after a fall. Patient states that she slipped and fell landing on her right side with hip dislocation.  1.Pt at moderate risk for yared-operative cardiac complication.  2.Ortho f/u.  3.UTI-u cx,Abx.  4.Pain control.  5.GI and DVT prophylaxis.

## 2022-10-18 RX ADMIN — LIDOCAINE 1 PATCH: 4 CREAM TOPICAL at 20:00

## 2022-10-18 RX ADMIN — LIDOCAINE 1 PATCH: 4 CREAM TOPICAL at 06:07

## 2022-10-18 RX ADMIN — LIDOCAINE 1 PATCH: 4 CREAM TOPICAL at 17:52

## 2022-10-18 RX ADMIN — CEFTRIAXONE 100 MILLIGRAM(S): 500 INJECTION, POWDER, FOR SOLUTION INTRAMUSCULAR; INTRAVENOUS at 17:50

## 2022-10-18 RX ADMIN — HEPARIN SODIUM 5000 UNIT(S): 5000 INJECTION INTRAVENOUS; SUBCUTANEOUS at 17:51

## 2022-10-18 NOTE — PROGRESS NOTE ADULT - PROBLEM SELECTOR PLAN 1
- presents after a mechanical fall   - XR shows right hip dislocated   - Ortho Consulted By ED  - f/u Ortho reccs, pending recs for open reduction. NWB to RLE  - RCRI: Class II (6% 30 day risk of death or MI)    pending OR based on previous hardware per Ortho PA

## 2022-10-18 NOTE — PROGRESS NOTE ADULT - PROBLEM SELECTOR PLAN 2
- presents after a mechanical fall, no LOC  - CTH: tiny indeterminate lacunar infarcts, CT spine multiple degenerative changes  - no focal deficits  - SW consult as pt lives alone    pt with UTI, UA positive  started on ceftriaxone, ucx pending s/s

## 2022-10-18 NOTE — PROGRESS NOTE ADULT - SUBJECTIVE AND OBJECTIVE BOX
89yFemale    Patient was seen and evaluated at bedside. Pt has hx of dementia and is confused at the moment, Patient with no acute complaints.   Pain is well controlled.   Denies CP/SOB, dyspnea, paresthesias, N/V/D, palpitations.     Vital Signs Last 24 Hrs  T(C): 36.5 (18 Oct 2022 05:10), Max: 36.8 (17 Oct 2022 20:31)  T(F): 97.7 (18 Oct 2022 05:10), Max: 98.3 (17 Oct 2022 20:31)  HR: 81 (18 Oct 2022 05:10) (81 - 101)  BP: 141/70 (18 Oct 2022 05:10) (131/63 - 141/70)  BP(mean): --  RR: 18 (18 Oct 2022 05:10) (16 - 18)  SpO2: 92% (18 Oct 2022 05:10) (92% - 99%)    Parameters below as of 18 Oct 2022 05:10  Patient On (Oxygen Delivery Method): room air      I&O's Detail    17 Oct 2022 07:01  -  18 Oct 2022 07:00  --------------------------------------------------------  IN:  Total IN: 0 mL    OUT:    Indwelling Catheter - Urethral (mL): 250 mL  Total OUT: 250 mL    Total NET: -250 mL          Physical Exam:    General: AAOx3, NAD, resting comfortably in bed.    ____ Hip:  Dressing is C/D/I. Skin is pink and warm. Staples intact. No erythema. SILT.  Wound with no drainage, healing well.   Lower extremity:  No calf tenderness, calves are soft. 2+pulses. NVI. (+)EHL/FHL/ADF/APF.  Good capillary refill. Warm, well-perfused.                           11.2   11.02 )-----------( 191      ( 17 Oct 2022 08:13 )             35.4     10-17    143  |  103  |  31<H>  ----------------------------<  109<H>  3.9   |  31  |  0.67    Ca    9.0      17 Oct 2022 08:13  Phos  3.5     10-17  Mg     2.3     10-17        Impression:  89yFemale S/p ___ Total Hip Replacement POD#__  Plan:  -  Pain management  -  Dvt prophylaxis  -  Daily Physical Theapy:  WBAT  PWB   TTWB    NWB  of the ____lower extremity  -  Discharge planning: Home Vs. Rehab pending Physical therapy eval.  -  Continue with Post-op Antibiotics x 24hrs  -  Incentive Spirometer  -  Hip precautions/abduction pillow  -  Case d/w ___    89yFemale    Patient was seen and evaluated at bedside. Pt has hx of dementia and is confused at the moment, Patient with no acute complaints. Pt denies any issues.   Denies CP/SOB, dyspnea, paresthesias, N/V/D, palpitations.     Vital Signs Last 24 Hrs  T(C): 36.5 (18 Oct 2022 05:10), Max: 36.8 (17 Oct 2022 20:31)  T(F): 97.7 (18 Oct 2022 05:10), Max: 98.3 (17 Oct 2022 20:31)  HR: 81 (18 Oct 2022 05:10) (81 - 101)  BP: 141/70 (18 Oct 2022 05:10) (131/63 - 141/70)  BP(mean): --  RR: 18 (18 Oct 2022 05:10) (16 - 18)  SpO2: 92% (18 Oct 2022 05:10) (92% - 99%)    Parameters below as of 18 Oct 2022 05:10  Patient On (Oxygen Delivery Method): room air      I&O's Detail    17 Oct 2022 07:01  -  18 Oct 2022 07:00  --------------------------------------------------------  IN:  Total IN: 0 mL    OUT:    Indwelling Catheter - Urethral (mL): 250 mL  Total OUT: 250 mL    Total NET: -250 mL        Physical Exam:    General: NAD, resting comfortably in bed.    R Hip:  leg is shortened flexed and internally rotated, Old lateral incision scar noted to be healed, (+) minimal ecchymosis noted laterally, Skin is pink and warm and intact. (+)TTP to hip region. Decreased ROM of the affected hip due to pain. SILT.   Lower extremity:  No calf tenderness, calves are soft. 2+pulses. NVI. (+)EHL/FHL/ADF/APF.  Good capillary refill. Warm, well-perfused. SILT.compartments soft/compressible.                           11.2   11.02 )-----------( 191      ( 17 Oct 2022 08:13 )             35.4     10-17    143  |  103  |  31<H>  ----------------------------<  109<H>  3.9   |  31  |  0.67    Ca    9.0      17 Oct 2022 08:13  Phos  3.5     10-17  Mg     2.3     10-17        Impression:  89 y/o Female w/ dislocation of R constrained DOMONIQUE  Plan:  -  S/w Dr. Geronimo- Recommends surgical intervention-closed possible open reduction possible revision surgery although needs further information regarding the components and past surgeries- currently components are being located and I will speak with HCP regarding any more information/decision making for surgery  -  Pain control  -  DVT prophylaxis  -  NWB to RLE  -  Case d/w medical team

## 2022-10-18 NOTE — PROGRESS NOTE ADULT - SUBJECTIVE AND OBJECTIVE BOX
Patient is a 89y old  Female who presents with a chief complaint of S/P Fall (17 Oct 2022 11:58)    pt seen in ed [  ], reg med floor [ x  ], bed [ x ], chair at bedside [   ], awake and responsive [ x ], lethargic [  ],    nad [  ]        Allergies    No Known Allergies        Vitals    T(F): 97.7 (10-18-22 @ 05:10), Max: 98.3 (10-17-22 @ 20:31)  HR: 81 (10-18-22 @ 05:10) (81 - 101)  BP: 141/70 (10-18-22 @ 05:10) (131/63 - 141/70)  RR: 18 (10-18-22 @ 05:10) (16 - 18)  SpO2: 92% (10-18-22 @ 05:10) (92% - 99%)  Wt(kg): --  CAPILLARY BLOOD GLUCOSE          Labs                          11.2   11.02 )-----------( 191      ( 17 Oct 2022 08:13 )             35.4       10-17    143  |  103  |  31<H>  ----------------------------<  109<H>  3.9   |  31  |  0.67    Ca    9.0      17 Oct 2022 08:13  Phos  3.5     10-17  Mg     2.3     10-17              Clean Catch Clean Catch (Midstream)  10-16 @ 01:53   >100,000 CFU/ml Escherichia coli  --  --          Radiology Results      Meds    MEDICATIONS  (STANDING):  cefTRIAXone   IVPB 1000 milliGRAM(s) IV Intermittent every 24 hours  dextrose 5% + sodium chloride 0.9%. 1000 milliLiter(s) (50 mL/Hr) IV Continuous <Continuous>  heparin   Injectable 5000 Unit(s) SubCutaneous every 12 hours  lidocaine   4% Patch 1 Patch Transdermal every 24 hours  pantoprazole    Tablet 40 milliGRAM(s) Oral before breakfast      MEDICATIONS  (PRN):  morphine  - Injectable 1 milliGRAM(s) IV Push once PRN Severe Pain (7 - 10)      Physical Exam    Neuro :  no focal deficits  Respiratory: CTA B/L  CV: RRR, S1S2, no murmurs,   Abdominal: Soft, NT, ND +BS,  Extremities: No edema, + peripheral pulses, right lower extrem shortened and internally rotated    ASSESSMENT    right posterior dislocation of DOMONIQUE  s/p fall,   uti  h/o arthritis,   Depression,   scoliosis,   dementia         PLAN    ortho f/u   needs open reduction and revision surgery- will attempt to find components and more information regarding the implants,   pt is NVI to the extremity without any compromise  DVT prophylaxis  C/w NV checks to RLE  NWB to RLE  cont lidocaine patch, toradol prn,   pain mgmt eval  cardio f/u  f/u echo   rocephin 1 gram daily  f/u ucx   cont current meds         Patient is a 89y old  Female who presents with a chief complaint of S/P Fall (17 Oct 2022 11:58)    pt seen in ed [  ], reg med floor [ x  ], bed [ x ], chair at bedside [   ], awake and responsive [ x ], lethargic [  ],    nad [  ]        Allergies    No Known Allergies        Vitals    T(F): 97.7 (10-18-22 @ 05:10), Max: 98.3 (10-17-22 @ 20:31)  HR: 81 (10-18-22 @ 05:10) (81 - 101)  BP: 141/70 (10-18-22 @ 05:10) (131/63 - 141/70)  RR: 18 (10-18-22 @ 05:10) (16 - 18)  SpO2: 92% (10-18-22 @ 05:10) (92% - 99%)  Wt(kg): --  CAPILLARY BLOOD GLUCOSE          Labs                          11.2   11.02 )-----------( 191      ( 17 Oct 2022 08:13 )             35.4       10-17    143  |  103  |  31<H>  ----------------------------<  109<H>  3.9   |  31  |  0.67    Ca    9.0      17 Oct 2022 08:13  Phos  3.5     10-17  Mg     2.3     10-17              Clean Catch Clean Catch (Midstream)  10-16 @ 01:53   >100,000 CFU/ml Escherichia coli  --  --    < from: Transthoracic Echocardiogram (10.17.22 @ 08:23) >  CONCLUSIONS:  1. Mitral annular calcification. Trace mitral  regurgitation.  2. Calcified trileaflet aortic valve with normal opening.  3. Aortic Root: 3.5 cm.  4. Normal left atrium.  LA volume index = 16 cc/m2.  5. Normal left ventricular internal dimensions and wall  thicknesses.  6. Normal Left Ventricular Systolic Function,  (EF = 55 to  60%)  7. Grade I diastolic dysfunction (Impaired relaxation,  mild).  8. Normal right atrium.  9. Normal right ventricular size and systolic function  (TAPSE 2.2 cm).  10. RV systolic pressure is moderately increased at  48 mm  Hg.  11. There is mild-moderate tricuspid regurgitation.  12. Pulmonic valve not well seen.  13. Normal pericardium with no pericardial effusion.    < end of copied text >        Radiology Results      Meds    MEDICATIONS  (STANDING):  cefTRIAXone   IVPB 1000 milliGRAM(s) IV Intermittent every 24 hours  dextrose 5% + sodium chloride 0.9%. 1000 milliLiter(s) (50 mL/Hr) IV Continuous <Continuous>  heparin   Injectable 5000 Unit(s) SubCutaneous every 12 hours  lidocaine   4% Patch 1 Patch Transdermal every 24 hours  pantoprazole    Tablet 40 milliGRAM(s) Oral before breakfast      MEDICATIONS  (PRN):  morphine  - Injectable 1 milliGRAM(s) IV Push once PRN Severe Pain (7 - 10)      Physical Exam    Neuro :  no focal deficits  Respiratory: CTA B/L  CV: RRR, S1S2, no murmurs,   Abdominal: Soft, NT, ND +BS,  Extremities: No edema, + peripheral pulses, right lower extrem shortened and internally rotated    ASSESSMENT    right posterior dislocation of DOMONIQUE  s/p fall,   uti  h/o arthritis,   Depression,   scoliosis,   dementia         PLAN    ortho f/u   needs open reduction and revision surgery- will attempt to find components and more information regarding the implants,   pt is NVI to the extremity without any compromise  DVT prophylaxis  C/w NV checks to RLE  NWB to RLE  cont lidocaine patch, toradol prn,   pain mgmt eval  cardio f/u   echo with EF = 55 to 60%. Grade I diastolic dysfunction. RV systolic pressure is moderately increased noted above   Pt at moderate risk for yared-operative cardiac complication for the proposed hip surgery.  rocephin 1 gram daily  ucx with e coli   f/u sens   cont current meds

## 2022-10-18 NOTE — PROGRESS NOTE ADULT - SUBJECTIVE AND OBJECTIVE BOX
CHIEF COMPLAINT:Patient is a 89y old  Female who presents with a chief complaint of R DOMONIQUE dislocation.Pt appears comfortable.    	  REVIEW OF SYSTEMS:  CONSTITUTIONAL: No fever, weight loss, or fatigue  EYES: No eye pain, visual disturbances, or discharge  ENT:  No difficulty hearing, tinnitus, vertigo; No sinus or throat pain  NECK: No pain or stiffness  RESPIRATORY: No cough, wheezing, chills or hemoptysis; No Shortness of Breath  CARDIOVASCULAR: No chest pain, palpitations, passing out, dizziness, or leg swelling  GASTROINTESTINAL: No abdominal or epigastric pain. No nausea, vomiting, or hematemesis; No diarrhea or constipation. No melena or hematochezia.  GENITOURINARY: No dysuria, frequency, hematuria, or incontinence  NEUROLOGICAL: No headaches, memory loss, loss of strength, numbness, or tremors  SKIN: No itching, burning, rashes, or lesions   LYMPH Nodes: No enlarged glands  ENDOCRINE: No heat or cold intolerance; No hair loss  MUSCULOSKELETAL: No joint pain or swelling; No muscle, back, or extremity pain  PSYCHIATRIC: No depression, anxiety, mood swings, or difficulty sleeping  HEME/LYMPH: No easy bruising, or bleeding gums  ALLERGY AND IMMUNOLOGIC: No hives or eczema	      PHYSICAL EXAM:  T(C): 36.5 (10-18-22 @ 05:10), Max: 36.8 (10-17-22 @ 20:31)  HR: 81 (10-18-22 @ 05:10) (81 - 101)  BP: 141/70 (10-18-22 @ 05:10) (131/63 - 141/70)  RR: 18 (10-18-22 @ 05:10) (16 - 18)  SpO2: 92% (10-18-22 @ 05:10) (92% - 99%)  Wt(kg): --  I&O's Summary    17 Oct 2022 07:01  -  18 Oct 2022 07:00  --------------------------------------------------------  IN: 0 mL / OUT: 250 mL / NET: -250 mL        Appearance: Normal	  HEENT:   Normal oral mucosa, PERRL, EOMI	  Lymphatic: No lymphadenopathy  Cardiovascular: Normal S1 S2, No JVD, No murmurs, No edema  Respiratory: Lungs clear to auscultation	  Psychiatry: A & O x 3, Mood & affect appropriate  Gastrointestinal:  Soft, Non-tender, + BS	  Skin: No rashes, No ecchymoses, No cyanosis	  Neurologic: Non-focal  Extremities: Normal range of motion, No clubbing, cyanosis or edema  Vascular: Peripheral pulses palpable 2+ bilaterally    MEDICATIONS  (STANDING):  cefTRIAXone   IVPB 1000 milliGRAM(s) IV Intermittent every 24 hours  dextrose 5% + sodium chloride 0.9%. 1000 milliLiter(s) (50 mL/Hr) IV Continuous <Continuous>  heparin   Injectable 5000 Unit(s) SubCutaneous every 12 hours  lidocaine   4% Patch 1 Patch Transdermal every 24 hours  pantoprazole    Tablet 40 milliGRAM(s) Oral before breakfast    	  	  LABS:	 	                       11.2   11.02 )-----------( 191      ( 17 Oct 2022 08:13 )             35.4     10-17    143  |  103  |  31<H>  ----------------------------<  109<H>  3.9   |  31  |  0.67    Ca    9.0      17 Oct 2022 08:13  Phos  3.5     10-17  Mg     2.3     10-17      proBNP:   Lipid Profile: Cholesterol 144  LDL --  HDL 50  TG 69  Ldl calc 80  Ratio --    HgA1c:   TSH: Thyroid Stimulating Hormone, Serum: 2.66 uU/mL (10-16 @ 05:30)      	      < from: Transthoracic Echocardiogram (10.17.22 @ 08:23) >  ------------------------------------------------------------------------  OBSERVATIONS:  Mitral Valve: Mitral annular calcification. Trace mitral  regurgitation.  Aortic Root: Aortic Root: 3.5 cm.    Aortic Valve: Calcified trileaflet aortic valve with normal  opening.  Left Atrium: Normal left atrium.  LA volume index = 16  cc/m2.  Left Ventricle: Normal Left Ventricular Systolic Function,  (EF = 55 to 60%) Normal left ventricular internal  dimensions and wall thicknesses. Grade I diastolic  dysfunction (Impaired relaxation, mild).  Right Heart: Normal right atrium. Normal right ventricular  size and systolic function (TAPSE 2.2 cm). There is  mild-moderate tricuspid regurgitation. Pulmonic valve not  well seen.  Pericardium/PleuraNormal pericardium with no pericardial  effusion.  Hemodynamic: RV systolic pressure is moderately increased  at  48 mm Hg.    < end of copied text >    Culture - Urine (10.16.22 @ 01:53)   Specimen Source: Clean Catch Clean Catch (Midstream)   Culture Results:   >100,000 CFU/ml Escherichia coli

## 2022-10-18 NOTE — PROGRESS NOTE ADULT - SUBJECTIVE AND OBJECTIVE BOX
PGY-2 Progress Note discussed with attending    CHIEF COMPLAINT & BRIEF HOSPITAL COURSE:  89 year old female, from home, hard of hearing, lives alone,  with PMHx of arthritis, Depression, scoliosis, dementia presents after a fall, found to have right hip dislocation.    INTERVAL HPI/OVERNIGHT EVENTS:   no acute events noted, pt without complaints      REVIEW OF SYSTEMS:  CONSTITUTIONAL: No fever, weight loss, or fatigue  RESPIRATORY: No cough, wheezing, chills or hemoptysis; No shortness of breath  CARDIOVASCULAR: No chest pain, palpitations, dizziness, or leg swelling  GASTROINTESTINAL: No abdominal pain. No nausea, vomiting, or hematemesis; No diarrhea or constipation. No melena or hematochezia.  GENITOURINARY: No dysuria or hematuria, urinary frequency  NEUROLOGICAL: No headaches, memory loss, loss of strength, numbness, or tremors  SKIN: No itching, burning, rashes, or lesions     MEDICATIONS  (STANDING):  cefTRIAXone   IVPB 1000 milliGRAM(s) IV Intermittent every 24 hours  dextrose 5% + sodium chloride 0.9%. 1000 milliLiter(s) (50 mL/Hr) IV Continuous <Continuous>  heparin   Injectable 5000 Unit(s) SubCutaneous every 12 hours  lidocaine   4% Patch 1 Patch Transdermal every 24 hours  pantoprazole    Tablet 40 milliGRAM(s) Oral before breakfast    MEDICATIONS  (PRN):  morphine  - Injectable 1 milliGRAM(s) IV Push once PRN Severe Pain (7 - 10)      Vital Signs Last 24 Hrs  T(C): 36.5 (18 Oct 2022 05:10), Max: 36.8 (17 Oct 2022 20:31)  T(F): 97.7 (18 Oct 2022 05:10), Max: 98.3 (17 Oct 2022 20:31)  HR: 81 (18 Oct 2022 05:10) (81 - 101)  BP: 141/70 (18 Oct 2022 05:10) (131/63 - 141/70)  BP(mean): --  RR: 18 (18 Oct 2022 05:10) (16 - 18)  SpO2: 92% (18 Oct 2022 05:10) (92% - 99%)    Parameters below as of 18 Oct 2022 05:10  Patient On (Oxygen Delivery Method): room air    PHYSICAL EXAMINATION:  GENERAL: NAD,   HEAD:  Atraumatic, Normocephalic  EYES:  conjunctiva and sclera clear  NECK: Supple, No JVD, Normal thyroid  CHEST/LUNG: Clear to auscultation. Clear to percussion bilaterally; No rales, rhonchi, wheezing, or rubs  HEART: Regular rate and rhythm; No murmurs, rubs, or gallops  ABDOMEN: Soft, Nontender, Nondistended; Bowel sounds present  NERVOUS SYSTEM:  Alert & Oriented X 2, no focal deficits    EXTREMITIES: right hip tender, shortened and internally rotated   SKIN: warm dry                          11.2   11.02 )-----------( 191      ( 17 Oct 2022 08:13 )             35.4     10-17    143  |  103  |  31<H>  ----------------------------<  109<H>  3.9   |  31  |  0.67    Ca    9.0      17 Oct 2022 08:13  Phos  3.5     10-17  Mg     2.3     10-17                I&O's Summary    17 Oct 2022 07:01  -  18 Oct 2022 07:00  --------------------------------------------------------  IN: 0 mL / OUT: 250 mL / NET: -250 mL          Culture - Urine (collected 16 Oct 2022 01:53)  Source: Clean Catch Clean Catch (Midstream)  Preliminary Report (17 Oct 2022 18:27):    >100,000 CFU/ml Escherichia coli        CAPILLARY BLOOD GLUCOSE      RADIOLOGY & ADDITIONAL TESTS:

## 2022-10-19 ENCOUNTER — TRANSCRIPTION ENCOUNTER (OUTPATIENT)
Age: 87
End: 2022-10-19

## 2022-10-19 LAB
-  AMIKACIN: SIGNIFICANT CHANGE UP
-  AMIKACIN: SIGNIFICANT CHANGE UP
-  AMOXICILLIN/CLAVULANIC ACID: SIGNIFICANT CHANGE UP
-  AMOXICILLIN/CLAVULANIC ACID: SIGNIFICANT CHANGE UP
-  AMPICILLIN/SULBACTAM: SIGNIFICANT CHANGE UP
-  AMPICILLIN/SULBACTAM: SIGNIFICANT CHANGE UP
-  AMPICILLIN: SIGNIFICANT CHANGE UP
-  AMPICILLIN: SIGNIFICANT CHANGE UP
-  AZTREONAM: SIGNIFICANT CHANGE UP
-  AZTREONAM: SIGNIFICANT CHANGE UP
-  CEFAZOLIN: SIGNIFICANT CHANGE UP
-  CEFAZOLIN: SIGNIFICANT CHANGE UP
-  CEFEPIME: SIGNIFICANT CHANGE UP
-  CEFEPIME: SIGNIFICANT CHANGE UP
-  CEFOXITIN: SIGNIFICANT CHANGE UP
-  CEFTRIAXONE: SIGNIFICANT CHANGE UP
-  CEFTRIAXONE: SIGNIFICANT CHANGE UP
-  CIPROFLOXACIN: SIGNIFICANT CHANGE UP
-  CIPROFLOXACIN: SIGNIFICANT CHANGE UP
-  ERTAPENEM: SIGNIFICANT CHANGE UP
-  ERTAPENEM: SIGNIFICANT CHANGE UP
-  GENTAMICIN: SIGNIFICANT CHANGE UP
-  GENTAMICIN: SIGNIFICANT CHANGE UP
-  IMIPENEM: SIGNIFICANT CHANGE UP
-  IMIPENEM: SIGNIFICANT CHANGE UP
-  LEVOFLOXACIN: SIGNIFICANT CHANGE UP
-  LEVOFLOXACIN: SIGNIFICANT CHANGE UP
-  MEROPENEM: SIGNIFICANT CHANGE UP
-  MEROPENEM: SIGNIFICANT CHANGE UP
-  NITROFURANTOIN: SIGNIFICANT CHANGE UP
-  NITROFURANTOIN: SIGNIFICANT CHANGE UP
-  PIPERACILLIN/TAZOBACTAM: SIGNIFICANT CHANGE UP
-  PIPERACILLIN/TAZOBACTAM: SIGNIFICANT CHANGE UP
-  TIGECYCLINE: SIGNIFICANT CHANGE UP
-  TIGECYCLINE: SIGNIFICANT CHANGE UP
-  TOBRAMYCIN: SIGNIFICANT CHANGE UP
-  TOBRAMYCIN: SIGNIFICANT CHANGE UP
-  TRIMETHOPRIM/SULFAMETHOXAZOLE: SIGNIFICANT CHANGE UP
-  TRIMETHOPRIM/SULFAMETHOXAZOLE: SIGNIFICANT CHANGE UP
ANION GAP SERPL CALC-SCNC: 6 MMOL/L — SIGNIFICANT CHANGE UP (ref 5–17)
BLD GP AB SCN SERPL QL: SIGNIFICANT CHANGE UP
BUN SERPL-MCNC: 23 MG/DL — HIGH (ref 7–18)
CALCIUM SERPL-MCNC: 9 MG/DL — SIGNIFICANT CHANGE UP (ref 8.4–10.5)
CHLORIDE SERPL-SCNC: 107 MMOL/L — SIGNIFICANT CHANGE UP (ref 96–108)
CO2 SERPL-SCNC: 29 MMOL/L — SIGNIFICANT CHANGE UP (ref 22–31)
CREAT SERPL-MCNC: 0.54 MG/DL — SIGNIFICANT CHANGE UP (ref 0.5–1.3)
CULTURE RESULTS: SIGNIFICANT CHANGE UP
EGFR: 85 ML/MIN/1.73M2 — SIGNIFICANT CHANGE UP
GLUCOSE SERPL-MCNC: 93 MG/DL — SIGNIFICANT CHANGE UP (ref 70–99)
HCT VFR BLD CALC: 34.2 % — LOW (ref 34.5–45)
HGB BLD-MCNC: 10.7 G/DL — LOW (ref 11.5–15.5)
MAGNESIUM SERPL-MCNC: 2.2 MG/DL — SIGNIFICANT CHANGE UP (ref 1.6–2.6)
MCHC RBC-ENTMCNC: 31.3 GM/DL — LOW (ref 32–36)
MCHC RBC-ENTMCNC: 31.8 PG — SIGNIFICANT CHANGE UP (ref 27–34)
MCV RBC AUTO: 101.8 FL — HIGH (ref 80–100)
METHOD TYPE: SIGNIFICANT CHANGE UP
METHOD TYPE: SIGNIFICANT CHANGE UP
NRBC # BLD: 0 /100 WBCS — SIGNIFICANT CHANGE UP (ref 0–0)
ORGANISM # SPEC MICROSCOPIC CNT: SIGNIFICANT CHANGE UP
PHOSPHATE SERPL-MCNC: 3.4 MG/DL — SIGNIFICANT CHANGE UP (ref 2.5–4.5)
PLATELET # BLD AUTO: 209 K/UL — SIGNIFICANT CHANGE UP (ref 150–400)
POTASSIUM SERPL-MCNC: 4 MMOL/L — SIGNIFICANT CHANGE UP (ref 3.5–5.3)
POTASSIUM SERPL-SCNC: 4 MMOL/L — SIGNIFICANT CHANGE UP (ref 3.5–5.3)
RBC # BLD: 3.36 M/UL — LOW (ref 3.8–5.2)
RBC # FLD: 12.6 % — SIGNIFICANT CHANGE UP (ref 10.3–14.5)
SODIUM SERPL-SCNC: 142 MMOL/L — SIGNIFICANT CHANGE UP (ref 135–145)
SPECIMEN SOURCE: SIGNIFICANT CHANGE UP
WBC # BLD: 8.27 K/UL — SIGNIFICANT CHANGE UP (ref 3.8–10.5)
WBC # FLD AUTO: 8.27 K/UL — SIGNIFICANT CHANGE UP (ref 3.8–10.5)

## 2022-10-19 RX ORDER — LANOLIN ALCOHOL/MO/W.PET/CERES
3 CREAM (GRAM) TOPICAL ONCE
Refills: 0 | Status: COMPLETED | OUTPATIENT
Start: 2022-10-19 | End: 2022-10-19

## 2022-10-19 RX ADMIN — PANTOPRAZOLE SODIUM 40 MILLIGRAM(S): 20 TABLET, DELAYED RELEASE ORAL at 05:50

## 2022-10-19 RX ADMIN — HEPARIN SODIUM 5000 UNIT(S): 5000 INJECTION INTRAVENOUS; SUBCUTANEOUS at 17:37

## 2022-10-19 RX ADMIN — LIDOCAINE 1 PATCH: 4 CREAM TOPICAL at 17:36

## 2022-10-19 RX ADMIN — LIDOCAINE 1 PATCH: 4 CREAM TOPICAL at 05:55

## 2022-10-19 RX ADMIN — LIDOCAINE 1 PATCH: 4 CREAM TOPICAL at 20:36

## 2022-10-19 RX ADMIN — HEPARIN SODIUM 5000 UNIT(S): 5000 INJECTION INTRAVENOUS; SUBCUTANEOUS at 05:50

## 2022-10-19 NOTE — PROGRESS NOTE ADULT - SUBJECTIVE AND OBJECTIVE BOX
PGY-2 Progress Note discussed with attending    CHIEF COMPLAINT & BRIEF HOSPITAL COURSE:  89 year old female, from home, hard of hearing, lives alone,  with PMHx of arthritis, Depression, scoliosis, dementia presents after a fall, found to have right hip dislocation.    INTERVAL HPI/OVERNIGHT EVENTS:   no acute events noted, pt without complaints      REVIEW OF SYSTEMS:  CONSTITUTIONAL: No fever, weight loss, or fatigue  RESPIRATORY: No cough, wheezing, chills or hemoptysis; No shortness of breath  CARDIOVASCULAR: No chest pain, palpitations, dizziness, or leg swelling  GASTROINTESTINAL: No abdominal pain. No nausea, vomiting, or hematemesis; No diarrhea or constipation. No melena or hematochezia.  GENITOURINARY: No dysuria or hematuria, urinary frequency  NEUROLOGICAL: No headaches, memory loss, loss of strength, numbness, or tremors  SKIN: No itching, burning, rashes, or lesions     MEDICATIONS  (STANDING):  dextrose 5% + sodium chloride 0.9%. 1000 milliLiter(s) (50 mL/Hr) IV Continuous <Continuous>  heparin   Injectable 5000 Unit(s) SubCutaneous every 12 hours  lidocaine   4% Patch 1 Patch Transdermal every 24 hours  pantoprazole    Tablet 40 milliGRAM(s) Oral before breakfast    MEDICATIONS  (PRN):  morphine  - Injectable 1 milliGRAM(s) IV Push once PRN Severe Pain (7 - 10)      Vital Signs Last 24 Hrs  T(C): 37.1 (19 Oct 2022 13:49), Max: 37.4 (18 Oct 2022 20:00)  T(F): 98.7 (19 Oct 2022 13:49), Max: 99.3 (18 Oct 2022 20:00)  HR: 89 (19 Oct 2022 13:49) (74 - 89)  BP: 143/77 (19 Oct 2022 13:49) (134/61 - 152/56)  BP(mean): 99 (19 Oct 2022 13:49) (99 - 99)  RR: 20 (19 Oct 2022 13:49) (19 - 20)  SpO2: 99% (19 Oct 2022 13:49) (96% - 99%)    Parameters below as of 19 Oct 2022 13:49  Patient On (Oxygen Delivery Method): room air        PHYSICAL EXAMINATION:  GENERAL: NAD,   HEAD:  Atraumatic, Normocephalic  EYES:  conjunctiva and sclera clear  NECK: Supple, No JVD, Normal thyroid  CHEST/LUNG: Clear to auscultation. Clear to percussion bilaterally; No rales, rhonchi, wheezing, or rubs  HEART: Regular rate and rhythm; No murmurs, rubs, or gallops  ABDOMEN: Soft, Nontender, Nondistended; Bowel sounds present  NERVOUS SYSTEM:  Alert & Oriented X 2, no focal deficits    EXTREMITIES: right hip tender, shortened and internally rotated   SKIN: warm dry                          10.7   8.27  )-----------( 209      ( 19 Oct 2022 08:18 )             34.2     10-19    142  |  107  |  23<H>  ----------------------------<  93  4.0   |  29  |  0.54    Ca    9.0      19 Oct 2022 08:18  Phos  3.4     10-19  Mg     2.2     10-19                I&O's Summary    18 Oct 2022 07:01  -  19 Oct 2022 07:00  --------------------------------------------------------  IN: 0 mL / OUT: 560 mL / NET: -560 mL            CAPILLARY BLOOD GLUCOSE      RADIOLOGY & ADDITIONAL TESTS:

## 2022-10-19 NOTE — PROGRESS NOTE ADULT - SUBJECTIVE AND OBJECTIVE BOX
CHIEF COMPLAINT:Patient is a 89y old  Female who presents with a chief complaint of S/P fall,Hip disclocation .Pt appears comfortable.    	  REVIEW OF SYSTEMS:  CONSTITUTIONAL: No fever, weight loss, or fatigue  EYES: No eye pain, visual disturbances, or discharge  ENT:  No difficulty hearing, tinnitus, vertigo; No sinus or throat pain  NECK: No pain or stiffness  RESPIRATORY: No cough, wheezing, chills or hemoptysis; No Shortness of Breath  CARDIOVASCULAR: No chest pain, palpitations, passing out, dizziness, or leg swelling  GASTROINTESTINAL: No abdominal or epigastric pain. No nausea, vomiting, or hematemesis; No diarrhea or constipation. No melena or hematochezia.  GENITOURINARY: No dysuria, frequency, hematuria, or incontinence  NEUROLOGICAL: No headaches, memory loss, loss of strength, numbness, or tremors  SKIN: No itching, burning, rashes, or lesions   LYMPH Nodes: No enlarged glands  ENDOCRINE: No heat or cold intolerance; No hair loss  MUSCULOSKELETAL: No joint pain or swelling; No muscle, back, or extremity pain  PSYCHIATRIC: No depression, anxiety, mood swings, or difficulty sleeping  HEME/LYMPH: No easy bruising, or bleeding gums  ALLERGY AND IMMUNOLOGIC: No hives or eczema	        PHYSICAL EXAM:  T(C): 36.7 (10-19-22 @ 05:18), Max: 37.4 (10-18-22 @ 20:00)  HR: 74 (10-19-22 @ 05:18) (74 - 90)  BP: 134/61 (10-19-22 @ 05:18) (116/75 - 152/56)  RR: 19 (10-19-22 @ 05:18) (18 - 20)  SpO2: 98% (10-19-22 @ 05:18) (96% - 98%)  Wt(kg): --  I&O's Summary    18 Oct 2022 07:01  -  19 Oct 2022 07:00  --------------------------------------------------------  IN: 0 mL / OUT: 560 mL / NET: -560 mL        Appearance: Normal	  HEENT:   Normal oral mucosa, PERRL, EOMI	  Lymphatic: No lymphadenopathy  Cardiovascular: Normal S1 S2, No JVD, No murmurs, No edema  Respiratory: Lungs clear to auscultation	  Psychiatry: A & O x 3, Mood & affect appropriate  Gastrointestinal:  Soft, Non-tender, + BS	  Skin: No rashes, No ecchymoses, No cyanosis	  Neurologic: Non-focal  Extremities: Normal range of motion, No clubbing, cyanosis or edema  Vascular: Peripheral pulses palpable 2+ bilaterally    MEDICATIONS  (STANDING):  dextrose 5% + sodium chloride 0.9%. 1000 milliLiter(s) (50 mL/Hr) IV Continuous <Continuous>  heparin   Injectable 5000 Unit(s) SubCutaneous every 12 hours  lidocaine   4% Patch 1 Patch Transdermal every 24 hours  pantoprazole    Tablet 40 milliGRAM(s) Oral before breakfast      	  	  LABS:	 	                       10.7   8.27  )-----------( 209      ( 19 Oct 2022 08:18 )             34.2     10-19    142  |  107  |  23<H>  ----------------------------<  93  4.0   |  29  |  0.54    Ca    9.0      19 Oct 2022 08:18  Phos  3.4     10-19  Mg     2.2     10-19        Lipid Profile: Cholesterol 144  LDL --  HDL 50  TG 69  Ldl calc 80     TSH: Thyroid Stimulating Hormone, Serum: 2.66 uU/mL (10-16 @ 05:30)

## 2022-10-19 NOTE — PROGRESS NOTE ADULT - SUBJECTIVE AND OBJECTIVE BOX
89yFemale    Patient was seen and evaluated at bedside. Pt has hx of dementia although answers questions today, Patient with no acute complaints. Pt denies any issues.   Denies CP/SOB, dyspnea, paresthesias, N/V/D, palpitations.     Vital Signs Last 24 Hrs  T(C): 37.1 (19 Oct 2022 13:49), Max: 37.4 (18 Oct 2022 20:00)  T(F): 98.7 (19 Oct 2022 13:49), Max: 99.3 (18 Oct 2022 20:00)  HR: 89 (19 Oct 2022 13:49) (74 - 89)  BP: 143/77 (19 Oct 2022 13:49) (134/61 - 152/56)  BP(mean): 99 (19 Oct 2022 13:49) (99 - 99)  RR: 20 (19 Oct 2022 13:49) (19 - 20)  SpO2: 99% (19 Oct 2022 13:49) (96% - 99%)    Parameters below as of 19 Oct 2022 13:49  Patient On (Oxygen Delivery Method): room air      I&O's Detail    18 Oct 2022 07:01  -  19 Oct 2022 07:00  --------------------------------------------------------  IN:  Total IN: 0 mL    OUT:    Indwelling Catheter - Urethral (mL): 560 mL  Total OUT: 560 mL    Total NET: -560 mL      19 Oct 2022 07:01  -  19 Oct 2022 17:49  --------------------------------------------------------  IN:  Total IN: 0 mL    OUT:    Indwelling Catheter - Urethral (mL): 300 mL  Total OUT: 300 mL    Total NET: -300 mL      Physical Exam:    General: NAD, resting comfortably in bed.    R Hip:  leg is shortened flexed and internally rotated, Old lateral incision scar noted to be healed, (+) minimal ecchymosis noted laterally, Skin is pink and warm and intact. (+)TTP to hip region. Decreased ROM of the affected hip due to pain. SILT.   Lower extremity:  No calf tenderness, calves are soft. 2+pulses. NVI. (+)EHL/FHL/ADF/APF.  Good capillary refill. Warm, well-perfused. SILT.compartments soft/compressible.                           10.7   8.27  )-----------( 209      ( 19 Oct 2022 08:18 )             34.2     10-19    142  |  107  |  23<H>  ----------------------------<  93  4.0   |  29  |  0.54    Ca    9.0      19 Oct 2022 08:18  Phos  3.4     10-19  Mg     2.2     10-19        Impression:  89 y/o Female w/ dislocation of R constrained DOMONIQUE  Plan:  -  D/w Dr. Geronimo- Recommends surgical intervention-closed possible open reduction possible revision surgery of the R DOMONIQUE dislocation  -  D/w pts HCP-Christen Reynoso at [phone]- she does not know any information about the previous surgeons/surgeries- although consents to this surgery  -  D/w medical team who state patient is optimized for surgery  -  Pain control  -  DVT prophylaxis  -  NWB to RLE  -  Keep pt NPO past midnight   -  Plan for OR tomorrow- On schedule for 5PM  -  2 units PRBC on hold for OR

## 2022-10-19 NOTE — PROGRESS NOTE ADULT - SUBJECTIVE AND OBJECTIVE BOX
Patient is a 89y old  Female who presents with a chief complaint of S/P fall,Hip disclocation (18 Oct 2022 12:36)      pt seen in ed [  ], reg med floor [ x  ], bed [ x ], chair at bedside [   ], awake and responsive [ x ], lethargic [  ],    nad [  ]    Allergies    No Known Allergies        Vitals    T(F): 98 (10-19-22 @ 05:18), Max: 99.3 (10-18-22 @ 20:00)  HR: 74 (10-19-22 @ 05:18) (74 - 90)  BP: 134/61 (10-19-22 @ 05:18) (116/75 - 152/56)  RR: 19 (10-19-22 @ 05:18) (18 - 20)  SpO2: 98% (10-19-22 @ 05:18) (96% - 98%)  Wt(kg): --  CAPILLARY BLOOD GLUCOSE          Labs                          11.2   11.02 )-----------( 191      ( 17 Oct 2022 08:13 )             35.4       10-17    143  |  103  |  31<H>  ----------------------------<  109<H>  3.9   |  31  |  0.67    Ca    9.0      17 Oct 2022 08:13  Phos  3.5     10-17  Mg     2.3     10-17              Clean Catch Clean Catch (Midstream)  10-16 @ 01:53   >100,000 CFU/ml Escherichia coli Susceptibility to follow.  --  --          Radiology Results      Meds    MEDICATIONS  (STANDING):  dextrose 5% + sodium chloride 0.9%. 1000 milliLiter(s) (50 mL/Hr) IV Continuous <Continuous>  heparin   Injectable 5000 Unit(s) SubCutaneous every 12 hours  lidocaine   4% Patch 1 Patch Transdermal every 24 hours  pantoprazole    Tablet 40 milliGRAM(s) Oral before breakfast      MEDICATIONS  (PRN):  morphine  - Injectable 1 milliGRAM(s) IV Push once PRN Severe Pain (7 - 10)      Physical Exam    Neuro :  no focal deficits  Respiratory: CTA B/L  CV: RRR, S1S2, no murmurs,   Abdominal: Soft, NT, ND +BS,  Extremities: No edema, + peripheral pulses, right lower extrem shortened and internally rotated    ASSESSMENT    right posterior dislocation of DOMONIQUE  s/p fall,   uti  h/o arthritis,   Depression,   scoliosis,   dementia         PLAN    ortho f/u   needs open reduction and revision surgery- will attempt to find components and more information regarding the implants,   pt is NVI to the extremity without any compromise  DVT prophylaxis  C/w NV checks to RLE  NWB to RLE  cont lidocaine patch, toradol prn,   pain mgmt eval  cardio f/u   echo with EF = 55 to 60%. Grade I diastolic dysfunction. RV systolic pressure is moderately increased noted above   Pt at moderate risk for yared-operative cardiac complication for the proposed hip surgery.  rocephin 1 gram daily  ucx with e coli   f/u sens   cont current meds         Patient is a 89y old  Female who presents with a chief complaint of S/P fall,Hip disclocation (18 Oct 2022 12:36)      pt seen in ed [  ], reg med floor [ x  ], bed [ x ], chair at bedside [   ], awake and responsive [ x ], lethargic [  ],    nad [  ]    Allergies    No Known Allergies        Vitals    T(F): 98 (10-19-22 @ 05:18), Max: 99.3 (10-18-22 @ 20:00)  HR: 74 (10-19-22 @ 05:18) (74 - 90)  BP: 134/61 (10-19-22 @ 05:18) (116/75 - 152/56)  RR: 19 (10-19-22 @ 05:18) (18 - 20)  SpO2: 98% (10-19-22 @ 05:18) (96% - 98%)  Wt(kg): --  CAPILLARY BLOOD GLUCOSE          Labs                          11.2   11.02 )-----------( 191      ( 17 Oct 2022 08:13 )             35.4       10-17    143  |  103  |  31<H>  ----------------------------<  109<H>  3.9   |  31  |  0.67    Ca    9.0      17 Oct 2022 08:13  Phos  3.5     10-17  Mg     2.3     10-17              Clean Catch Clean Catch (Midstream)  10-16 @ 01:53   >100,000 CFU/ml Escherichia coli Susceptibility to follow.  --  --  `        Radiology Results      Meds    MEDICATIONS  (STANDING):  dextrose 5% + sodium chloride 0.9%. 1000 milliLiter(s) (50 mL/Hr) IV Continuous <Continuous>  heparin   Injectable 5000 Unit(s) SubCutaneous every 12 hours  lidocaine   4% Patch 1 Patch Transdermal every 24 hours  pantoprazole    Tablet 40 milliGRAM(s) Oral before breakfast      MEDICATIONS  (PRN):  morphine  - Injectable 1 milliGRAM(s) IV Push once PRN Severe Pain (7 - 10)      Physical Exam    Neuro :  no focal deficits  Respiratory: CTA B/L  CV: RRR, S1S2, no murmurs,   Abdominal: Soft, NT, ND +BS,  Extremities: No edema, + peripheral pulses, right lower extrem shortened and internally rotated    ASSESSMENT    right posterior dislocation of DOMONIQUE  s/p fall,   uti  h/o arthritis,   Depression,   scoliosis,   dementia         PLAN    ortho f/u   needs open reduction and revision surgery- will attempt to find components and more information regarding the implants,   pt is NVI to the extremity without any compromise  DVT prophylaxis  NWB to RLE  cont lidocaine patch, toradol prn,   pain mgmt eval  cardio f/u   echo with EF = 55 to 60%. Grade I diastolic dysfunction. RV systolic pressure is moderately increased noted above   Pt at moderate risk for yared-operative cardiac complication for the proposed hip surgery.  rocephin 1 gram daily  ucx with e coli   f/u sens   cont current meds

## 2022-10-20 ENCOUNTER — RESULT REVIEW (OUTPATIENT)
Age: 87
End: 2022-10-20

## 2022-10-20 ENCOUNTER — TRANSCRIPTION ENCOUNTER (OUTPATIENT)
Age: 87
End: 2022-10-20

## 2022-10-20 DIAGNOSIS — M19.90 UNSPECIFIED OSTEOARTHRITIS, UNSPECIFIED SITE: ICD-10-CM

## 2022-10-20 DIAGNOSIS — I27.20 PULMONARY HYPERTENSION, UNSPECIFIED: ICD-10-CM

## 2022-10-20 DIAGNOSIS — N39.0 URINARY TRACT INFECTION, SITE NOT SPECIFIED: ICD-10-CM

## 2022-10-20 DIAGNOSIS — M25.551 PAIN IN RIGHT HIP: ICD-10-CM

## 2022-10-20 LAB
ANION GAP SERPL CALC-SCNC: 2 MMOL/L — LOW (ref 5–17)
ANION GAP SERPL CALC-SCNC: 4 MMOL/L — LOW (ref 5–17)
BUN SERPL-MCNC: 18 MG/DL — SIGNIFICANT CHANGE UP (ref 7–18)
BUN SERPL-MCNC: 20 MG/DL — HIGH (ref 7–18)
CALCIUM SERPL-MCNC: 8.7 MG/DL — SIGNIFICANT CHANGE UP (ref 8.4–10.5)
CALCIUM SERPL-MCNC: 9.2 MG/DL — SIGNIFICANT CHANGE UP (ref 8.4–10.5)
CHLORIDE SERPL-SCNC: 104 MMOL/L — SIGNIFICANT CHANGE UP (ref 96–108)
CHLORIDE SERPL-SCNC: 105 MMOL/L — SIGNIFICANT CHANGE UP (ref 96–108)
CO2 SERPL-SCNC: 33 MMOL/L — HIGH (ref 22–31)
CO2 SERPL-SCNC: 35 MMOL/L — HIGH (ref 22–31)
CREAT SERPL-MCNC: 0.48 MG/DL — LOW (ref 0.5–1.3)
CREAT SERPL-MCNC: 0.52 MG/DL — SIGNIFICANT CHANGE UP (ref 0.5–1.3)
EGFR: 86 ML/MIN/1.73M2 — SIGNIFICANT CHANGE UP
EGFR: 87 ML/MIN/1.73M2 — SIGNIFICANT CHANGE UP
GLUCOSE BLDC GLUCOMTR-MCNC: 87 MG/DL — SIGNIFICANT CHANGE UP (ref 70–99)
GLUCOSE BLDC GLUCOMTR-MCNC: 92 MG/DL — SIGNIFICANT CHANGE UP (ref 70–99)
GLUCOSE SERPL-MCNC: 78 MG/DL — SIGNIFICANT CHANGE UP (ref 70–99)
GLUCOSE SERPL-MCNC: 80 MG/DL — SIGNIFICANT CHANGE UP (ref 70–99)
HCT VFR BLD CALC: 35.8 % — SIGNIFICANT CHANGE UP (ref 34.5–45)
HCT VFR BLD CALC: 38.4 % — SIGNIFICANT CHANGE UP (ref 34.5–45)
HGB BLD-MCNC: 11.2 G/DL — LOW (ref 11.5–15.5)
HGB BLD-MCNC: 11.9 G/DL — SIGNIFICANT CHANGE UP (ref 11.5–15.5)
MCHC RBC-ENTMCNC: 31 GM/DL — LOW (ref 32–36)
MCHC RBC-ENTMCNC: 31.3 GM/DL — LOW (ref 32–36)
MCHC RBC-ENTMCNC: 31.6 PG — SIGNIFICANT CHANGE UP (ref 27–34)
MCHC RBC-ENTMCNC: 31.9 PG — SIGNIFICANT CHANGE UP (ref 27–34)
MCV RBC AUTO: 102 FL — HIGH (ref 80–100)
MCV RBC AUTO: 102.1 FL — HIGH (ref 80–100)
NRBC # BLD: 0 /100 WBCS — SIGNIFICANT CHANGE UP (ref 0–0)
NRBC # BLD: 0 /100 WBCS — SIGNIFICANT CHANGE UP (ref 0–0)
PLATELET # BLD AUTO: 210 K/UL — SIGNIFICANT CHANGE UP (ref 150–400)
PLATELET # BLD AUTO: 235 K/UL — SIGNIFICANT CHANGE UP (ref 150–400)
POTASSIUM SERPL-MCNC: 3.9 MMOL/L — SIGNIFICANT CHANGE UP (ref 3.5–5.3)
POTASSIUM SERPL-MCNC: 4.1 MMOL/L — SIGNIFICANT CHANGE UP (ref 3.5–5.3)
POTASSIUM SERPL-SCNC: 3.9 MMOL/L — SIGNIFICANT CHANGE UP (ref 3.5–5.3)
POTASSIUM SERPL-SCNC: 4.1 MMOL/L — SIGNIFICANT CHANGE UP (ref 3.5–5.3)
RBC # BLD: 3.51 M/UL — LOW (ref 3.8–5.2)
RBC # BLD: 3.76 M/UL — LOW (ref 3.8–5.2)
RBC # FLD: 12.1 % — SIGNIFICANT CHANGE UP (ref 10.3–14.5)
RBC # FLD: 12.3 % — SIGNIFICANT CHANGE UP (ref 10.3–14.5)
SODIUM SERPL-SCNC: 141 MMOL/L — SIGNIFICANT CHANGE UP (ref 135–145)
SODIUM SERPL-SCNC: 142 MMOL/L — SIGNIFICANT CHANGE UP (ref 135–145)
WBC # BLD: 11.36 K/UL — HIGH (ref 3.8–10.5)
WBC # BLD: 8.94 K/UL — SIGNIFICANT CHANGE UP (ref 3.8–10.5)
WBC # FLD AUTO: 11.36 K/UL — HIGH (ref 3.8–10.5)
WBC # FLD AUTO: 8.94 K/UL — SIGNIFICANT CHANGE UP (ref 3.8–10.5)

## 2022-10-20 PROCEDURE — 88304 TISSUE EXAM BY PATHOLOGIST: CPT | Mod: 26

## 2022-10-20 PROCEDURE — 72170 X-RAY EXAM OF PELVIS: CPT | Mod: 26,59

## 2022-10-20 PROCEDURE — 64712 REVISION OF SCIATIC NERVE: CPT | Mod: AS,59,RT

## 2022-10-20 PROCEDURE — 73552 X-RAY EXAM OF FEMUR 2/>: CPT | Mod: 26,RT

## 2022-10-20 PROCEDURE — 27253 TREAT HIP DISLOCATION: CPT | Mod: AS,RT

## 2022-10-20 PROCEDURE — 76000 FLUOROSCOPY <1 HR PHYS/QHP: CPT | Mod: 26

## 2022-10-20 PROCEDURE — 99222 1ST HOSP IP/OBS MODERATE 55: CPT | Mod: FS

## 2022-10-20 PROCEDURE — 77071 MNL APPL STRS JT RADIOGRAPHY: CPT

## 2022-10-20 PROCEDURE — 73502 X-RAY EXAM HIP UNI 2-3 VIEWS: CPT | Mod: 26,RT

## 2022-10-20 PROCEDURE — 88311 DECALCIFY TISSUE: CPT | Mod: 26

## 2022-10-20 RX ORDER — ERTAPENEM SODIUM 1 G/1
INJECTION, POWDER, LYOPHILIZED, FOR SOLUTION INTRAMUSCULAR; INTRAVENOUS
Refills: 0 | Status: COMPLETED | OUTPATIENT
Start: 2022-10-20 | End: 2022-10-24

## 2022-10-20 RX ORDER — MAGNESIUM HYDROXIDE 400 MG/1
30 TABLET, CHEWABLE ORAL DAILY
Refills: 0 | Status: DISCONTINUED | OUTPATIENT
Start: 2022-10-20 | End: 2022-10-27

## 2022-10-20 RX ORDER — ACETAMINOPHEN 500 MG
1000 TABLET ORAL EVERY 8 HOURS
Refills: 0 | Status: COMPLETED | OUTPATIENT
Start: 2022-10-20 | End: 2022-10-23

## 2022-10-20 RX ORDER — ENOXAPARIN SODIUM 100 MG/ML
30 INJECTION SUBCUTANEOUS EVERY 12 HOURS
Refills: 0 | Status: DISCONTINUED | OUTPATIENT
Start: 2022-10-21 | End: 2022-10-27

## 2022-10-20 RX ORDER — ERTAPENEM SODIUM 1 G/1
1000 INJECTION, POWDER, LYOPHILIZED, FOR SOLUTION INTRAMUSCULAR; INTRAVENOUS EVERY 24 HOURS
Refills: 0 | Status: COMPLETED | OUTPATIENT
Start: 2022-10-21 | End: 2022-10-23

## 2022-10-20 RX ORDER — CEFAZOLIN SODIUM 1 G
1000 VIAL (EA) INJECTION EVERY 8 HOURS
Refills: 0 | Status: DISCONTINUED | OUTPATIENT
Start: 2022-10-20 | End: 2022-10-20

## 2022-10-20 RX ORDER — FENTANYL CITRATE 50 UG/ML
25 INJECTION INTRAVENOUS
Refills: 0 | Status: DISCONTINUED | OUTPATIENT
Start: 2022-10-20 | End: 2022-10-20

## 2022-10-20 RX ORDER — ERTAPENEM SODIUM 1 G/1
1000 INJECTION, POWDER, LYOPHILIZED, FOR SOLUTION INTRAMUSCULAR; INTRAVENOUS ONCE
Refills: 0 | Status: COMPLETED | OUTPATIENT
Start: 2022-10-20 | End: 2022-10-20

## 2022-10-20 RX ORDER — SODIUM CHLORIDE 9 MG/ML
1000 INJECTION, SOLUTION INTRAVENOUS
Refills: 0 | Status: DISCONTINUED | OUTPATIENT
Start: 2022-10-20 | End: 2022-10-20

## 2022-10-20 RX ORDER — POLYETHYLENE GLYCOL 3350 17 G/17G
17 POWDER, FOR SOLUTION ORAL AT BEDTIME
Refills: 0 | Status: DISCONTINUED | OUTPATIENT
Start: 2022-10-20 | End: 2022-10-27

## 2022-10-20 RX ORDER — FENTANYL CITRATE 50 UG/ML
50 INJECTION INTRAVENOUS
Refills: 0 | Status: DISCONTINUED | OUTPATIENT
Start: 2022-10-20 | End: 2022-10-20

## 2022-10-20 RX ORDER — ALBUTEROL 90 UG/1
AEROSOL, METERED ORAL
Refills: 0 | Status: DISCONTINUED | OUTPATIENT
Start: 2022-10-20 | End: 2022-10-27

## 2022-10-20 RX ORDER — ONDANSETRON 8 MG/1
4 TABLET, FILM COATED ORAL EVERY 6 HOURS
Refills: 0 | Status: DISCONTINUED | OUTPATIENT
Start: 2022-10-20 | End: 2022-10-27

## 2022-10-20 RX ORDER — OXYCODONE HYDROCHLORIDE 5 MG/1
5 TABLET ORAL EVERY 6 HOURS
Refills: 0 | Status: DISCONTINUED | OUTPATIENT
Start: 2022-10-20 | End: 2022-10-24

## 2022-10-20 RX ORDER — AMLODIPINE BESYLATE 2.5 MG/1
2.5 TABLET ORAL DAILY
Refills: 0 | Status: DISCONTINUED | OUTPATIENT
Start: 2022-10-20 | End: 2022-10-27

## 2022-10-20 RX ORDER — OXYCODONE HYDROCHLORIDE 5 MG/1
2.5 TABLET ORAL EVERY 4 HOURS
Refills: 0 | Status: DISCONTINUED | OUTPATIENT
Start: 2022-10-20 | End: 2022-10-24

## 2022-10-20 RX ORDER — CEFAZOLIN SODIUM 1 G
1000 VIAL (EA) INJECTION EVERY 8 HOURS
Refills: 0 | Status: DISCONTINUED | OUTPATIENT
Start: 2022-10-21 | End: 2022-10-21

## 2022-10-20 RX ORDER — SENNA PLUS 8.6 MG/1
2 TABLET ORAL AT BEDTIME
Refills: 0 | Status: DISCONTINUED | OUTPATIENT
Start: 2022-10-20 | End: 2022-10-27

## 2022-10-20 RX ORDER — IPRATROPIUM/ALBUTEROL SULFATE 18-103MCG
3 AEROSOL WITH ADAPTER (GRAM) INHALATION EVERY 6 HOURS
Refills: 0 | Status: DISCONTINUED | OUTPATIENT
Start: 2022-10-20 | End: 2022-10-27

## 2022-10-20 RX ADMIN — Medication 1000 MILLIGRAM(S): at 21:53

## 2022-10-20 RX ADMIN — POLYETHYLENE GLYCOL 3350 17 GRAM(S): 17 POWDER, FOR SOLUTION ORAL at 21:53

## 2022-10-20 RX ADMIN — Medication 3 MILLILITER(S): at 21:51

## 2022-10-20 RX ADMIN — ERTAPENEM SODIUM 120 MILLIGRAM(S): 1 INJECTION, POWDER, LYOPHILIZED, FOR SOLUTION INTRAMUSCULAR; INTRAVENOUS at 09:58

## 2022-10-20 RX ADMIN — LIDOCAINE 1 PATCH: 4 CREAM TOPICAL at 22:38

## 2022-10-20 RX ADMIN — SENNA PLUS 2 TABLET(S): 8.6 TABLET ORAL at 21:53

## 2022-10-20 RX ADMIN — LIDOCAINE 1 PATCH: 4 CREAM TOPICAL at 05:11

## 2022-10-20 RX ADMIN — Medication 1000 MILLIGRAM(S): at 22:19

## 2022-10-20 RX ADMIN — HEPARIN SODIUM 5000 UNIT(S): 5000 INJECTION INTRAVENOUS; SUBCUTANEOUS at 05:38

## 2022-10-20 RX ADMIN — FENTANYL CITRATE 50 MICROGRAM(S): 50 INJECTION INTRAVENOUS at 20:24

## 2022-10-20 RX ADMIN — PANTOPRAZOLE SODIUM 40 MILLIGRAM(S): 20 TABLET, DELAYED RELEASE ORAL at 05:39

## 2022-10-20 RX ADMIN — FENTANYL CITRATE 50 MICROGRAM(S): 50 INJECTION INTRAVENOUS at 20:09

## 2022-10-20 NOTE — CONSULT NOTE ADULT - PROBLEM SELECTOR RECOMMENDATION 9
Pt with acute Right hip pain which is somatic in nature due to R hip dislocation XR Pelvis. Patient seen by Ortho team, plan for OR today 10/20/2022 for closed possible open reduction possible revision surgery of the R DOMONIQUE dislocation. High risk medications reviewed. Avoid polypharmacy. Avoid IV opioids. Avoid NSAIDs and benzodiazepines. Non-pharmacological sleep aides initiated. Non-opioid medications and non-pharmacological pain management measures initiated.  Opioid pain recommendations   - Continue Morphine 1 mg IV q4h prn for severe pain while NPO.  - Tramadol 25 mg PO q 8 hours post-operative. Monitor for sedation/ respiratory depression.   - Oxycodone 2.5 mg PO q 4 hours PRN moderate pain post-op. Monitor for sedation/ respiratory depression.   Non-opioid pain recommendations   - Acetaminophen 1 gram PO q 8 hours for 3 days post-op. Monitor LFTs  - Celebrex 200mg PO daily per primary team  Bowel Regimen  - Continue Miralax 17G PO daily  - Continue Senna 2 tablets at bedtime for constipation  Mild pain   - Non-pharmacological pain treatment recommendations  - Warm/ Cool packs PRN   - Repositioning extremity, elevation, imagery, relaxation, distraction.  - Physical therapy OOB if no contraindications   Recommendations discussed with primary team and RN.  Upon discharge – dc on Celebrex 200mg po daily. Percocet 5/325mg po q 6 hours prn for 1 week. Pt to take OTC stool softeners Pt with acute Right hip pain which is somatic in nature due to R hip dislocation XR Pelvis. Patient seen by Ortho team, plan for OR today 10/20/2022 for closed possible open reduction possible revision surgery of the R DOMONIQUE dislocation. High risk medications reviewed. Avoid polypharmacy. Avoid IV opioids. Avoid NSAIDs and benzodiazepines. Non-pharmacological sleep aides initiated. Non-opioid medications and non-pharmacological pain management measures initiated.  Opioid pain recommendations   - Continue Morphine 1 mg IV q4h prn for severe pain while NPO.  - Tramadol 25 mg PO q 8 hours post-operative. Monitor for sedation/ respiratory depression.   - Oxycodone 2.5 mg PO q 4 hours PRN moderate pain post-op. Monitor for sedation/ respiratory depression.   Non-opioid pain recommendations   - Acetaminophen 1 gram PO q 8 hours for 3 days post-op. Monitor LFTs  - Continue Lidocaine patches 4%  - Celebrex 200mg PO daily per primary team  Bowel Regimen  - Continue Miralax 17G PO daily  - Continue Senna 2 tablets at bedtime for constipation  Mild pain   - Non-pharmacological pain treatment recommendations  - Warm/ Cool packs PRN   - Repositioning extremity, elevation, imagery, relaxation, distraction.  - Physical therapy OOB if no contraindications   Recommendations discussed with primary team and RN.  Upon discharge – dc on Celebrex 200mg po daily. Percocet 5/325mg po q 6 hours prn for 1 week. Pt to take OTC stool softeners Pt with acute Right hip pain which is somatic in nature due to R hip dislocation XR Pelvis. Patient seen by Ortho team, plan for OR today 10/20/2022 for closed possible open reduction possible revision surgery of the R DOMONIQUE dislocation. High risk medications reviewed. Avoid polypharmacy. Avoid IV opioids. Avoid NSAIDs and benzodiazepines. Non-pharmacological sleep aides initiated. Non-opioid medications and non-pharmacological pain management measures initiated.  Opioid pain recommendations   - Continue Morphine 1 mg IV q4h prn for severe pain while NPO.  - Start Oxycodone 2.5 mg/5 mg PO q 4 hours PRN moderate/severe pain post-op. Monitor for sedation/ respiratory depression.   Non-opioid pain recommendations   - Acetaminophen 1 gram PO q 6 hours for 4 days post-op. Monitor LFTs  - Continue Lidocaine patches 4%  Bowel Regimen  - Continue Miralax 17G PO daily  - Continue Senna 2 tablets at bedtime for constipation  Mild pain   - Non-pharmacological pain treatment recommendations  - Warm/ Cool packs PRN   - Repositioning extremity, elevation, imagery, relaxation, distraction.  - Physical therapy OOB if no contraindications   Recommendations discussed with primary team and RN.

## 2022-10-20 NOTE — PROGRESS NOTE ADULT - SUBJECTIVE AND OBJECTIVE BOX
CHIEF COMPLAINT:Patient is a 89y old  Female who presents with a chief complaint of R DOMONIQUE dislocation.Pt appears comfortable.    	  REVIEW OF SYSTEMS:  CONSTITUTIONAL: No fever, weight loss, or fatigue  EYES: No eye pain, visual disturbances, or discharge  ENT:  No difficulty hearing, tinnitus, vertigo; No sinus or throat pain  NECK: No pain or stiffness  RESPIRATORY: No cough, wheezing, chills or hemoptysis; No Shortness of Breath  CARDIOVASCULAR: No chest pain, palpitations, passing out, dizziness, or leg swelling  GASTROINTESTINAL: No abdominal or epigastric pain. No nausea, vomiting, or hematemesis; No diarrhea or constipation. No melena or hematochezia.  GENITOURINARY: No dysuria, frequency, hematuria, or incontinence  NEUROLOGICAL: No headaches, memory loss, loss of strength, numbness, or tremors  SKIN: No itching, burning, rashes, or lesions   LYMPH Nodes: No enlarged glands  ENDOCRINE: No heat or cold intolerance; No hair loss  MUSCULOSKELETAL: No joint pain or swelling; No muscle, back, or extremity pain  PSYCHIATRIC: No depression, anxiety, mood swings, or difficulty sleeping  HEME/LYMPH: No easy bruising, or bleeding gums  ALLERGY AND IMMUNOLOGIC: No hives or eczema	        PHYSICAL EXAM:  T(C): 36.6 (10-20-22 @ 05:10), Max: 37.1 (10-19-22 @ 13:49)  HR: 81 (10-20-22 @ 05:10) (81 - 94)  BP: 142/68 (10-20-22 @ 05:10) (140/72 - 152/74)  RR: 18 (10-20-22 @ 05:10) (18 - 20)  SpO2: 100% (10-20-22 @ 05:10) (98% - 100%)  Wt(kg): --  I&O's Summary    19 Oct 2022 07:01  -  20 Oct 2022 07:00  --------------------------------------------------------  IN: 0 mL / OUT: 700 mL / NET: -700 mL        Appearance: Normal	  HEENT:   Normal oral mucosa, PERRL, EOMI	  Lymphatic: No lymphadenopathy  Cardiovascular: Normal S1 S2, No JVD, No murmurs, No edema  Respiratory: Lungs clear to auscultation	  Psychiatry: A & O x 3, Mood & affect appropriate  Gastrointestinal:  Soft, Non-tender, + BS	  Skin: No rashes, No ecchymoses, No cyanosis	  Neurologic: Non-focal  Extremities: Normal range of motion, No clubbing, cyanosis or edema  Vascular: Peripheral pulses palpable 2+ bilaterally    MEDICATIONS  (STANDING):  ALBUTerol    90 MICROgram(s) HFA Inhaler      albuterol/ipratropium for Nebulization 3 milliLiter(s) Nebulizer every 6 hours  amLODIPine   Tablet 2.5 milliGRAM(s) Oral daily  dextrose 5% + sodium chloride 0.9%. 1000 milliLiter(s) (50 mL/Hr) IV Continuous <Continuous>  ertapenem  IVPB      lidocaine   4% Patch 1 Patch Transdermal every 24 hours  pantoprazole    Tablet 40 milliGRAM(s) Oral before breakfast      	  	  LABS:	 	                                  10.7   8.27  )-----------( 209      ( 19 Oct 2022 08:18 )             34.2     10-19    142  |  107  |  23<H>  ----------------------------<  93  4.0   |  29  |  0.54    Ca    9.0      19 Oct 2022 08:18  Phos  3.4     10-19  Mg     2.2     10-19      Lipid Profile: Cholesterol 144  LDL --  HDL 50  TG 69  Ldl calc 80    TSH: Thyroid Stimulating Hormone, Serum: 2.66 uU/mL (10-16 @ 05:30)      	    Culture - Urine (10.16.22 @ 01:53)   - Amikacin: S <=16   - Amikacin: S <=16   - Amoxicillin/Clavulanic Acid: S <=8/4   - Amoxicillin/Clavulanic Acid: S <=8/4 Consider reserving for cystitis when ampicillin/sulbactam is resistant   - Ampicillin: R >16 These ampicillin results predict results for amoxicillin   - Ampicillin: R >16 These ampicillin results predict results for amoxicillin   - Ampicillin/Sulbactam: S 8/4 Enterobacter, Klebsiella aerogenes, Citrobacter, and Serratia may develop resistance during prolonged therapy (3-4 days)   - Ampicillin/Sulbactam: R >16/8 Enterobacter, Klebsiella aerogenes, Citrobacter, and Serratia may develop resistance during prolonged therapy (3-4 days)   - Aztreonam: R >16   - Aztreonam: S <=4   - Cefazolin: S <=2 For uncomplicated UTI with K. pneumoniae, E. coli, or P. mirablis: BORIS <=16 is sensitive and BORIS >=32 is resistant. This also predicts results for oral agents cefaclor, cefdinir, cefpodoxime, cefprozil, cefuroxime axetil, cephalexin and locarbef for uncomplicated UTI. Note that some isolates may be susceptible to these agents while testing resistant to cefazolin.   - Cefazolin: R >16 For uncomplicated UTI with K. pneumoniae, E. coli, or P. mirablis: BORIS <=16 is sensitive and BORIS >=32 is resistant. This also predicts results for oral agents cefaclor, cefdinir, cefpodoxime, cefprozil, cefuroxime axetil, cephalexin and locarbef for uncomplicated UTI. Note that some isolates may be susceptible to these agents while testing resistant to cefazolin.   - Cefepime: R >16   - Cefepime: S <=2   - Cefoxitin: S <=8   - Ceftriaxone: S <=1 Enterobacter, Klebsiella aerogenes, Citrobacter, and Serratia may develop resistance during prolonged therapy   - Ceftriaxone: R >32 Enterobacter, Klebsiella aerogenes, Citrobacter, and Serratia may develop resistance during prolonged therapy   - Ciprofloxacin: R >2   - Ciprofloxacin: S <=0.25   - Ertapenem: S <=0.5   - Ertapenem: S <=0.5   - Gentamicin: R >8   - Gentamicin: R >8   - Imipenem: S <=1   - Imipenem: S <=1   - Levofloxacin: R >4   - Levofloxacin: S <=0.5   - Meropenem: S <=1   - Meropenem: S <=1   - Nitrofurantoin: S <=32 Should not be used to treat pyelonephritis   - Nitrofurantoin: S <=32 Should not be used to treat pyelonephritis   - Piperacillin/Tazobactam: S <=8   - Piperacillin/Tazobactam: S 16   - Tigecycline: S <=2   - Tigecycline: S <=2   - Tobramycin: I 8   - Tobramycin: I 8   - Trimethoprim/Sulfamethoxazole: S <=0.5/9.5   - Trimethoprim/Sulfamethoxazole: R >2/38   Specimen Source: Clean Catch Clean Catch (Midstream)   Culture Results:   >100,000 CFU/ml Escherichia coli   >100,000 CFU/ml Escherichia coli ESBL   Organism Identification: Escherichia coli ESBL   Escherichia coli   Organism: Escherichia coli ESBL   Organism: Escherichia coli

## 2022-10-20 NOTE — PROGRESS NOTE ADULT - PROBLEM SELECTOR PLAN 1
- presents after a mechanical fall   - XR shows right hip dislocated   - Ortho Consulted By ED  -  Ortho>  open reduction. NWB to RLE today   - RCRI: Class II (6% 30 day risk of death or MI)

## 2022-10-20 NOTE — CONSULT NOTE ADULT - PROBLEM SELECTOR RECOMMENDATION 9
X ray hip noted  Ortho follow-up  Ortho recommends surgical intervention.   Patient on NPO as per Ortho  pain management.

## 2022-10-20 NOTE — CONSULT NOTE ADULT - SUBJECTIVE AND OBJECTIVE BOX
PULMONARY CONSULT NOTE      JUSTYNA LOMAX  [id number]    Patient is a 89y old  Female who presents with a chief complaint of R DOMONIQUE dislocation (20 Oct 2022 09:37)    History of Present Illness:  History of Present Illness:    89 year old female, from home, hard of hearing, lives alone,  with PMHx of arthritis, Depression, scoliosis, dementia presents after a fall. Patient states that she slipped and fell landing on her right side. Patient denies any head trauma or LOC but endorses right hip pain. Denies any headaches, numbness, tingling, fever, chills, nausea,  vomiting chest pain, SOB, abdominal pain, or change in bowel habits.     In the ED:  Afebrile, hemodynamically stable   CTH: tiny indeterminate lacunar infarcts, CT spine multiple degenerative changes  Xray hip: dislocation right hip      HISTORY OF PRESENT ILLNESS: As above. Awake, alert, comfortable, laying in bed, not in acute distress on RA.     MEDICATIONS  (STANDING):  dextrose 5% + sodium chloride 0.9%. 1000 milliLiter(s) (50 mL/Hr) IV Continuous <Continuous>  ertapenem  IVPB      lidocaine   4% Patch 1 Patch Transdermal every 24 hours  pantoprazole    Tablet 40 milliGRAM(s) Oral before breakfast      MEDICATIONS  (PRN):  morphine  - Injectable 1 milliGRAM(s) IV Push once PRN Severe Pain (7 - 10)      Allergies    No Known Allergies    Intolerances        PAST MEDICAL & SURGICAL HISTORY:  Arthritis      No significant past surgical history          FAMILY HISTORY:      SOCIAL HISTORY  Smoking History:     REVIEW OF SYSTEMS:    CONSTITUTIONAL:  No fevers, chills, sweats    HEENT:  Eyes:  No diplopia or blurred vision. ENT:  No earache, sore throat or runny nose.    CARDIOVASCULAR:  No pressure, squeezing, tightness, or heaviness about the chest; no palpitations.    RESPIRATORY:  Per HPI    GASTROINTESTINAL:  No abdominal pain, nausea, vomiting or diarrhea.    GENITOURINARY:  No dysuria, frequency or urgency.    NEUROLOGIC:  No paresthesias, fasciculations, seizures or weakness.    PSYCHIATRIC:  No disorder of thought or mood.    Vital Signs Last 24 Hrs  T(C): 36.6 (20 Oct 2022 05:10), Max: 37.1 (19 Oct 2022 13:49)  T(F): 97.9 (20 Oct 2022 05:10), Max: 98.7 (19 Oct 2022 13:49)  HR: 81 (20 Oct 2022 05:10) (81 - 94)  BP: 142/68 (20 Oct 2022 05:10) (140/72 - 152/74)  BP(mean): 99 (19 Oct 2022 13:49) (99 - 99)  RR: 18 (20 Oct 2022 05:10) (18 - 20)  SpO2: 100% (20 Oct 2022 05:10) (98% - 100%)    Parameters below as of 20 Oct 2022 05:10  Patient On (Oxygen Delivery Method): nasal cannula  O2 Flow (L/min): 2    I&O's Detail    19 Oct 2022 07:01  -  20 Oct 2022 07:00  --------------------------------------------------------  IN:  Total IN: 0 mL    OUT:    Indwelling Catheter - Urethral (mL): 700 mL  Total OUT: 700 mL    Total NET: -700 mL          PHYSICAL EXAMINATION:    GENERAL: The patient is a well-developed, well-nourished _____in no apparent distress.     HEENT: Head is normocephalic and atraumatic. Extraocular muscles are intact. Mucous membranes are moist.     NECK: Supple.     LUNGS: Clear to auscultation without wheezing, rales, or rhonchi. Respirations unlabored    HEART: Regular rate and rhythm without murmur.    ABDOMEN: Soft, nontender, and nondistended.  No hepatosplenomegaly is noted.    EXTREMITIES: Without any cyanosis, clubbing, rash, lesions or edema.    NEUROLOGIC: Grossly intact.      LABS:                        10.7   8.27  )-----------( 209      ( 19 Oct 2022 08:18 )             34.2     10-19    142  |  107  |  23<H>  ----------------------------<  93  4.0   |  29  |  0.54    Ca    9.0      19 Oct 2022 08:18  Phos  3.4     10-19  Mg     2.2     10-19                          MICROBIOLOGY:    RADIOLOGY & ADDITIONAL STUDIES:    CXR:    Ct scan chest;    ekg;    echo:  < from: Transthoracic Echocardiogram (10.17.22 @ 08:23) >  CONCLUSIONS:  1. Mitral annular calcification. Trace mitral  regurgitation.  2. Calcified trileaflet aortic valve with normal opening.  3. Aortic Root: 3.5 cm.  4. Normal left atrium.  LA volume index = 16 cc/m2.  5. Normal left ventricular internal dimensions and wall  thicknesses.  6. Normal Left Ventricular Systolic Function,  (EF = 55 to  60%)  7. Grade I diastolic dysfunction (Impaired relaxation,  mild).  8. Normal right atrium.  9. Normal right ventricular size and systolic function  (TAPSE 2.2 cm).  10. RV systolic pressure is moderately increased at  48 mm  Hg.  11. There is mild-moderate tricuspid regurgitation.  12. Pulmonic valve not well seen.  13. Normal pericardium with no pericardial effusion.    < end of copied text >

## 2022-10-20 NOTE — BRIEF OPERATIVE NOTE - NSICDXBRIEFPROCEDURE_GEN_ALL_CORE_FT
PROCEDURES:  Reduction, spontaneous dislocation, hip 20-Oct-2022 19:19:55 Right hip open reduction Reese Jordan

## 2022-10-20 NOTE — PROGRESS NOTE ADULT - SUBJECTIVE AND OBJECTIVE BOX
PGY-1 Progress Note discussed with attending    PAGER #: [[phone]] TILL 5:00 PM  PLEASE CONTACT ON CALL TEAM:  - On Call Team (Please refer to Wayne) FROM 5:00 PM - 8:30PM  - Nightfloat Team FROM 8:30 -7:30 AM      CHIEF COMPLAINT & BRIEF HOSPITAL COURSE:  89 year old female, from home, hard of hearing, lives alone,  with PMHx of arthritis, Depression, scoliosis, dementia presents after a fall, found to have right hip dislocation.    INTERVAL HPI/OVERNIGHT EVENTS:   no acute events noted, pt without complaints      REVIEW OF SYSTEMS:  CONSTITUTIONAL: No fever, weight loss, or fatigue  RESPIRATORY: No cough, wheezing, chills or hemoptysis; No shortness of breath  CARDIOVASCULAR: No chest pain, palpitations, dizziness, or leg swelling  GASTROINTESTINAL: No abdominal pain. No nausea, vomiting, or hematemesis; No diarrhea or constipation. No melena or hematochezia.  GENITOURINARY: No dysuria or hematuria, urinary frequency  NEUROLOGICAL: No headaches, memory loss, loss of strength, numbness, or tremors  SKIN: No itching, burning, rashes, or lesions     MEDICATIONS  (STANDING):  dextrose 5% + sodium chloride 0.9%. 1000 milliLiter(s) (50 mL/Hr) IV Continuous <Continuous>  heparin   Injectable 5000 Unit(s) SubCutaneous every 12 hours  lidocaine   4% Patch 1 Patch Transdermal every 24 hours  pantoprazole    Tablet 40 milliGRAM(s) Oral before breakfast    MEDICATIONS  (PRN):  morphine  - Injectable 1 milliGRAM(s) IV Push once PRN Severe Pain (7 - 10)        Vital Signs Last 24 Hrs  T(C): 37 (20 Oct 2022 13:57), Max: 37 (20 Oct 2022 13:57)  T(F): 98.6 (20 Oct 2022 13:57), Max: 98.6 (20 Oct 2022 13:57)  HR: 85 (20 Oct 2022 13:57) (81 - 94)  BP: 137/67 (20 Oct 2022 13:57) (137/67 - 152/74)  BP(mean): --  RR: 18 (20 Oct 2022 13:57) (18 - 20)  SpO2: 100% (20 Oct 2022 13:57) (98% - 100%)    Parameters below as of 20 Oct 2022 13:57  Patient On (Oxygen Delivery Method): nasal cannula  O2 Flow (L/min): 2      PHYSICAL EXAMINATION:  GENERAL: NAD,   HEAD:  Atraumatic, Normocephalic  EYES:  conjunctiva and sclera clear  NECK: Supple, No JVD, Normal thyroid  CHEST/LUNG: Clear to auscultation. Clear to percussion bilaterally; No rales, rhonchi, wheezing, or rubs  HEART: Regular rate and rhythm; No murmurs, rubs, or gallops  ABDOMEN: Soft, Nontender, Nondistended; Bowel sounds present  NERVOUS SYSTEM:  Alert & Oriented X 2, no focal deficits    EXTREMITIES: right hip tender, shortened and internally rotated   SKIN: warm dry                          11.9   8.94  )-----------( 235      ( 20 Oct 2022 13:40 )             38.4     10-20    142  |  105  |  20<H>  ----------------------------<  80  4.1   |  35<H>  |  0.52    Ca    9.2      20 Oct 2022 13:40  Phos  3.4     10-19  Mg     2.2     10-19                CAPILLARY BLOOD GLUCOSE      RADIOLOGY & ADDITIONAL TESTS:

## 2022-10-20 NOTE — PROGRESS NOTE ADULT - ASSESSMENT
89 year old female, from home, hard of hearing, lives alone,  with PMHx of arthritis, Depression, scoliosis, dementia presents after a fall. Patient states that she slipped and fell landing on her right side with hip dislocation,UTI-ESBL.  1.Pt at moderate risk for yared-operative cardiac complication.  2.Ortho f/u.  3.UTI-Abx.  4.Pain control.  5.GI and DVT prophylaxis.

## 2022-10-20 NOTE — CONSULT NOTE ADULT - SUBJECTIVE AND OBJECTIVE BOX
Source of information: JUSTYNA LOMAX, Chart review  Patient language: English  : n/a    HPI:   89 year old female, from home, hard of hearing, lives alone,  with PMHx of arthritis, Depression, scoliosis, dementia presents after a fall. Patient states that she slipped and fell landing on her right side. Patient denies any head trauma or LOC but endorses right hip pain. Denies any headaches, numbness, tingling, fever, chills, nausea,  vomiting chest pain, SOB, abdominal pain, or change in bowel habits.     In the ED:  Afebrile, hemodynamically stable   CTH: tiny indeterminate lacunar infarcts, CT spine multiple degenerative changes  Xray hip: dislocation right hip   (15 Oct 2022 18:35)      Patient is a 89y old  Female who presents with a chief complaint of R DOMONIQUE dislocation (19 Oct 2022 17:46)  . Pt is admitted for ..., being treated with .... Pain consulted for .... Pt seen and examined at bedside. Reports pain score ***  SCALE USED: (1-10 VNRS). Pt describes pain as .... radiating to... alleviated by pain medication... exacerbated by movement... Pt tolerating PO diet. Denies lethargy, nausea, vomiting, constipation, itchiness. Reports last BM ***. Patient stated goal for pain control: to be able to take deep breaths, get out of bed to chair and ambulate with tolerable pain control. Pt denies taking medications for pain at home.     PAST MEDICAL & SURGICAL HISTORY:  Arthritis      No significant past surgical history          FAMILY HISTORY:      Social History:  Lives at home alone, no family, denies any alcohol or tobacco use (15 Oct 2022 18:35)   [ ] Denies ETOH use, illicit drug use and smoking    Allergies    No Known Allergies    Intolerances        MEDICATIONS  (STANDING):  dextrose 5% + sodium chloride 0.9%. 1000 milliLiter(s) (50 mL/Hr) IV Continuous <Continuous>  ertapenem  IVPB      ertapenem  IVPB 1000 milliGRAM(s) IV Intermittent once  lidocaine   4% Patch 1 Patch Transdermal every 24 hours  pantoprazole    Tablet 40 milliGRAM(s) Oral before breakfast    MEDICATIONS  (PRN):  morphine  - Injectable 1 milliGRAM(s) IV Push once PRN Severe Pain (7 - 10)      Vital Signs Last 24 Hrs  T(C): 36.6 (20 Oct 2022 05:10), Max: 37.1 (19 Oct 2022 13:49)  T(F): 97.9 (20 Oct 2022 05:10), Max: 98.7 (19 Oct 2022 13:49)  HR: 81 (20 Oct 2022 05:10) (81 - 94)  BP: 142/68 (20 Oct 2022 05:10) (140/72 - 152/74)  BP(mean): 99 (19 Oct 2022 13:49) (99 - 99)  RR: 18 (20 Oct 2022 05:10) (18 - 20)  SpO2: 100% (20 Oct 2022 05:10) (98% - 100%)    Parameters below as of 20 Oct 2022 05:10  Patient On (Oxygen Delivery Method): nasal cannula  O2 Flow (L/min): 2      LABS: Reviewed.                          10.7   8.27  )-----------( 209      ( 19 Oct 2022 08:18 )             34.2     10-19    142  |  107  |  23<H>  ----------------------------<  93  4.0   |  29  |  0.54    Ca    9.0      19 Oct 2022 08:18  Phos  3.4     10-19  Mg     2.2     10-19            CAPILLARY BLOOD GLUCOSE      POCT Blood Glucose.: 92 mg/dL (20 Oct 2022 05:30)    COVID-19 PCR: NotDetec (15 Oct 2022 12:51)      Radiology: Reviewed.     ORT Score -   Family Hx of substance abuse	Female	      Male  Alcohol 	                                           1                     3  Illegal drugs	                                   2                     3  Rx drugs                                           4 	                  4  Personal Hx of substance abuse		  Alcohol 	                                          3	                  3  Illegal drugs                                     4	                  4  Rx drugs                                            5 	                  5  Age between 16- 45 years	           1                     1  hx preadolescent sexual abuse	   3 	                  0  Psychological disease		  ADD, OCD, bipolar, schizophrenia   2	          2  Depression                                           1 	          1  Total: 0    a score of 3 or lower indicates low risk for opioid abuse		  a score of 4-7 indicates moderate risk for opioid abuse		  a score of 8 or higher indicates high risk for opioid abuse  	  4AT (Assessment test for delirium & cognitive impairment)  _________________________________________________________  [1] ALERTNESS  This includes patients who may be markedly drowsy (eg. difficult to rouse and/or obviously sleepy  during assessment) or agitated/hyperactive. Observe the patient. If asleep, attempt to wake with  speech or gentle touch on shoulder. Ask the patient to state their name and address to assist rating.  Normal (fully alert, but not agitated, throughout assessment) 0  Mild sleepiness for <10 seconds after waking, then normal 0  Clearly abnormal 4    [2] AMT4  Age, date of birth, place (name of the hospital or building), current year.  No mistakes 0  1 mistake 1  2 or more mistakes/untestable 2    [3] ATTENTION  Ask the patient: “Please tell me the months of the year in backwards order, starting at December.”  To assist initial understanding one prompt of “what is the month before December?” is permitted.  Months of the year backwards Achieves 7 months or more correctly 0  Starts but scores <7 months / refuses to start 1   Untestable (cannot start because unwell, drowsy, inattentive) 2    [4] ACUTE CHANGE OR FLUCTUATING COURSE  Evidence of significant change or fluctuation in: alertness, cognition, other mental function  (eg. paranoia, hallucinations) arising over the last 2 weeks and still evident in last 24hrs  No 0  Yes 4    4 or above: possible delirium +/- cognitive impairment  1-3: possible cognitive impairment  0: delirium or severe cognitive impairment unlikely (but delirium still possible if [4] information incomplete)    4AT SCORE: 0    REVIEW OF SYSTEMS:  CONSTITUTIONAL: No fever or fatigue  HEENT:  No difficulty hearing, no change in vision  NECK: No pain or stiffness  RESPIRATORY: No cough, wheezing, chills or hemoptysis; No shortness of breath  CARDIOVASCULAR: No chest pain, palpitations, dizziness, or leg swelling  GASTROINTESTINAL: No loss of appetite, decreased PO intake. No abdominal or epigastric pain. No nausea, vomiting; No diarrhea or constipation.   GENITOURINARY: No dysuria, frequency, hematuria, retention or incontinence  MUSCULOSKELETAL: No joint pain or swelling; No muscle, back, or extremity pain, no upper or lower motor strength weakness, no saddle anesthesia, bowel/bladder incontinence, no falls   NEURO: No headaches, No numbness/tingling b/l LE, No weakness  ENDOCRINE: No polyuria, polydipsia, heat or cold intolerance; No hair loss  PSYCHIATRIC: No depression, anxiety or difficulty sleeping    PHYSICAL EXAM:  GENERAL:  Alert & Oriented X4, cooperative, NAD, Good concentration. Speech is clear.   RESPIRATORY: Respirations even and unlabored. Clear to auscultation bilaterally; No rales, rhonchi, wheezing, or rubs  CARDIOVASCULAR: Normal S1/S2, regular rate and rhythm; No murmurs, rubs, or gallops. No JVD.   GASTROINTESTINAL:  Soft, Nontender, Nondistended; Bowel sounds present  PERIPHERAL VASCULAR:  Extremities warm without edema. 2+ Peripheral Pulses, No cyanosis, No calf tenderness  MUSCULOSKELETAL: Motor Strength 5/5 B/L upper and lower extremities; moves all extremities equally against gravity; ROM intact; negative SLR; No tenderness on palpation of all joints.   SKIN: Warm, dry, intact. No rashes, lesions, scars or wounds.     Risk factors associated with adverse outcomes related to opioid treatment  [ ]  Concurrent benzodiazepine use  [ ]  History/ Active substance use or alcohol use disorder  [ ] Psychiatric co-morbidity  [ ] Sleep apnea  [ ] COPD  [ ] BMI> 35  [ ] Liver dysfunction  [ ] Renal dysfunction  [ ] CHF  [ ] Smoker  [ ]  Age > 60 years    [ ]  [place]  Reviewed and Copied to Chart. See below.    Plan of care and goal oriented pain management treatment options were discussed with patient and /or primary care giver; all questions and concerns were addressed and care was aligned with patient's wishes.    Educated patient on goal oriented pain management treatment options        Source of information: JUSTYNA LOMAX, Chart review  Patient language: English  : n/a    HPI:   89 year old female, from home, hard of hearing, lives alone,  with PMHx of arthritis, Depression, scoliosis, dementia presents after a fall. Patient states that she slipped and fell landing on her right side. Patient denies any head trauma or LOC but endorses right hip pain. Denies any headaches, numbness, tingling, fever, chills, nausea,  vomiting chest pain, SOB, abdominal pain, or change in bowel habits.     In the ED:  Afebrile, hemodynamically stable   CTH: tiny indeterminate lacunar infarcts, CT spine multiple degenerative changes  Xray hip: dislocation right hip   (15 Oct 2022 18:35)    Pt is admitted for R hip pain s/p Fall. Pain consulted for acute right hip pain. XR Pelvis +R hip dislocation. Patient seen by Ortho, plan for OR today 10/20/2022 closed possible open reduction possible revision surgery of the R DOMONIQUE dislocation. Pt seen and examined at bedside this morning. Patient found awake, alert and oriented x 2, reoriented to time. Patient with hx of dementia but able to answer questions appropriately. Reports no pain currently, when at rest, stating pain only when she moves 6/10.  SCALE USED: (1-10 VNRS). Pt describes pain as aching, non-radiating, alleviated by pain medication and exacerbated by movement. Pt currently NPO for OR today. Denies lethargy, nausea, vomiting, constipation, itchiness. Reports last BM today 10/20. Patient stated goal for pain control: to be able to take deep breaths, get out of bed to chair and ambulate with tolerable pain control. Pt denies taking medications for pain at home.     PAST MEDICAL & SURGICAL HISTORY:  Arthritis    No significant past surgical history    FAMILY HISTORY:    Social History:  Lives at home alone, no family, denies any alcohol or tobacco use (15 Oct 2022 18:35)   [x] Denies ETOH use, illicit drug use and smoking    Allergies    No Known Allergies    Intolerances    MEDICATIONS  (STANDING):  dextrose 5% + sodium chloride 0.9%. 1000 milliLiter(s) (50 mL/Hr) IV Continuous <Continuous>  ertapenem  IVPB      ertapenem  IVPB 1000 milliGRAM(s) IV Intermittent once  lidocaine   4% Patch 1 Patch Transdermal every 24 hours  pantoprazole    Tablet 40 milliGRAM(s) Oral before breakfast    MEDICATIONS  (PRN):  morphine  - Injectable 1 milliGRAM(s) IV Push once PRN Severe Pain (7 - 10)    Vital Signs Last 24 Hrs  T(C): 36.6 (20 Oct 2022 05:10), Max: 37.1 (19 Oct 2022 13:49)  T(F): 97.9 (20 Oct 2022 05:10), Max: 98.7 (19 Oct 2022 13:49)  HR: 81 (20 Oct 2022 05:10) (81 - 94)  BP: 142/68 (20 Oct 2022 05:10) (140/72 - 152/74)  BP(mean): 99 (19 Oct 2022 13:49) (99 - 99)  RR: 18 (20 Oct 2022 05:10) (18 - 20)  SpO2: 100% (20 Oct 2022 05:10) (98% - 100%)    Parameters below as of 20 Oct 2022 05:10  Patient On (Oxygen Delivery Method): nasal cannula  O2 Flow (L/min): 2    LABS: Reviewed.                        10.7   8.27  )-----------( 209      ( 19 Oct 2022 08:18 )             34.2     10-19    142  |  107  |  23<H>  ----------------------------<  93  4.0   |  29  |  0.54    Ca    9.0      19 Oct 2022 08:18  Phos  3.4     10-19  Mg     2.2     10-19    CAPILLARY BLOOD GLUCOSE    POCT Blood Glucose.: 92 mg/dL (20 Oct 2022 05:30)    COVID-19 PCR: NotDetec (15 Oct 2022 12:51)    Radiology: Reviewed.   ACC: [id number] EXAM:  XR PELVIS AP ONLY 1-2 VIEWS                          PROCEDURE DATE:  10/15/2022      INTERPRETATION:  History: Fall.    FINDINGS: Frontal pelvis.    COMPARISON: 5/27/2012.    Patient has history of right hip arthroplasty. Dislocation of the femoral   component. No periprosthetic fracture appreciated.    Degenerative change visualized lower lumbar spine.    Calcification overlying the pelvis may represent fibroid calcification.    IMPRESSION:    Dislocation femoral component of right hip prosthesis.    --- End of Report ---    LIDIA JUAREZ MD; Attending Radiologist  This document has been electronically signed. Oct 16 2022  1:57PM  ACC: [id number] EXAM:  CT CERVICAL SPINE                        ACC: [id number] EXAM:  CT BRAIN                          PROCEDURE DATE:  10/15/2022          INTERPRETATION:  CT head without IV contrast    CLINICAL INFORMATION: Intracranial hemorrhage.    TECHNIQUE: Contiguous axial 5 mm sections were obtained through the head.   Sagittal and coronal 2-D reformatted images were also obtained.   This   scan was performed using automatic exposure control (radiation dose   reduction software) to obtain a diagnostic image quality scan with   patient dose as low as reasonably achievable.    FINDINGS:   No previous examinations are available for review.    The brain demonstrates minimal anterior periventricular white matter   ischemia. Tiny indeterminate lacunar infarction in the anterior limb of   the LEFT internal capsule.   No acute cerebral cortical infarct is seen.    No intracranial hemorrhage is found.  No mass effect is found in the   brain.    The ventricles, sulci and basal cisterns demonstrate mild to moderate   cerebellar and temporal lobe atrophy    The orbits are unremarkable.  The paranasal sinuses are clear.  The nasal   cavity appears intact.  The nasopharynx is symmetric.  The central skull   base, petrous temporal bones andcalvarium remain intact.        CT cervical spine without IV contrast    CLINICAL INFORMATION: Fracture, trauma, neck pain.  Neck pain, spinal   stenosis, spondylosis. HCTall    TECHNIQUE:  Contiguous axial 2.0 mm sections were obtained through the   cervical spine using a single helical acquisition.  Additional 2 mm   sagittal and coronal reformatted reconstructions of the spine were   obtained.  These additional reformatted images were used to evaluate the   spine for alignment, vertebral fractures and the integrity of the the   posterior elements.   This scan was performed using automatic exposure   control (radiation dose reduction software) to obtain a diagnostic image   quality scan with patient dose as low as reasonably achievable.    FINDINGS:   No prior similar studies are available for review    Cervical vertebral body heights are maintained. No vertebral fracture is   seen. No destructive bone lesion is found.  Alignment is significant for   straightening with grade 1 anterior spondylolisthesis at C2-3, C3-4 and   C7-T1 on a degenerative basis.  Facet joints appear intact and aligned.    Cervical intervertebral disc spaces show advanced degenerative disc   disease and spondylosis at C3-4 C7-T1 with loss of disc height and   associated degenerative endplate changes. There is narrowing of the   BILATERAL C2-3 through C6-7 neural foramina due to uncovertebral spurring   and facet osteophytic hypertrophy. Degenerative cord impingement is seen   at C4-5 through C6-7 due to posterior osteophytic ridge/disc complexes.    The skull base appears intact.  No neck mass is recognized.  Paraspinal   soft tissues appear intact. Visualized lymph nodes appear to be within   physiologic size limits.    Interstitial fibrosis is seen at the RIGHT lung apex.    IMPRESSION:    CT HEAD: Mild periventricular white matter ischemia.Tiny indeterminate   lacunar infarction in the anterior limb of the LEFT internal capsule.   Mild to moderate cerebellar and temporal lobe atrophy.    CT cervical spine:   No vertebral fracture is recognized.  Straightening   with grade 1 anterior spondylolisthesis at C2-3, C3-4 and C7-T1 on a   degenerative basis.    Advanced degenerative disc disease and spondylosis   at C3-4 C7-T1 with narrowing of the BILATERAL C2-3 through C6-7 neural   foramina due to uncovertebral spurring and facet osteophytic hypertrophy.   Degenerative cord impingement is seen at C4-5 through C6-7 due to   posterior osteophytic ridge/disc complexes.    --- End of Report ---    ROJELIO MONET MD; Attending Radiologist  This document has been electronically signed. Oct 15 2022 12:38PM    ORT Score -   Family Hx of substance abuse	Female	      Male  Alcohol 	                                           1                     3  Illegal drugs	                                   2                     3  Rx drugs                                           4 	                  4  Personal Hx of substance abuse		  Alcohol 	                                          3	                  3  Illegal drugs                                     4	                  4  Rx drugs                                            5 	                  5  Age between 16- 45 years	           1                     1  hx preadolescent sexual abuse	   3 	                  0  Psychological disease		  ADD, OCD, bipolar, schizophrenia   2	          2  Depression                                           1 	          1  Total: 1    a score of 3 or lower indicates low risk for opioid abuse		  a score of 4-7 indicates moderate risk for opioid abuse		  a score of 8 or higher indicates high risk for opioid abuse  	  REVIEW OF SYSTEMS:  CONSTITUTIONAL: No fever or fatigue  HEENT:  No difficulty hearing, no change in vision  NECK: No pain or stiffness  RESPIRATORY: No cough, wheezing, chills or hemoptysis; No shortness of breath  CARDIOVASCULAR: No chest pain, palpitations, dizziness, or leg swelling  GASTROINTESTINAL: Currently NPO status. No abdominal or epigastric pain. No nausea, vomiting; No diarrhea or constipation.   GENITOURINARY: Mcgill catheter in place.  MUSCULOSKELETAL: R hip pain. No muscle, back, or extremity pain, no upper or lower motor strength weakness, no saddle anesthesia, bowel/bladder incontinence, + falls   NEURO: No headaches, No numbness/tingling b/l LE, No weakness  ENDOCRINE: No polyuria, polydipsia, heat or cold intolerance; No hair loss  PSYCHIATRIC: + hx of depression, no anxiety or difficulty sleeping    PHYSICAL EXAM:  GENERAL:  Alert & Oriented X2 reoriented to time, calm, able to answer questions appropriately. Speech is clear.   RESPIRATORY: Respirations even and unlabored. Clear to auscultation bilaterally; No rales, rhonchi, wheezing, or rubs. On 2 L NC  CARDIOVASCULAR: Normal S1/S2, regular rate and rhythm; No murmurs, rubs, or gallops. No JVD.   GASTROINTESTINAL:  Soft, Nontender, Nondistended; Bowel sounds present.  PERIPHERAL VASCULAR:  Extremities warm without edema. 2+ Peripheral Pulses, No cyanosis, No calf tenderness  MUSCULOSKELETAL: Motor Strength 5/5 B/L upper and 3/5 lower extremities; moves all extremities equally against gravity; ROM decreased RLE; R leg is shortened and internally rotated,  +tenderness on palpation of R hip.   SKIN: Warm, dry, intact. No rashes or lesions. +R hip scar, ecchymosis on b/l hands and legs    Risk factors associated with adverse outcomes related to opioid treatment  [ ]  Concurrent benzodiazepine use  [ ]  History/ Active substance use or alcohol use disorder  [x] Psychiatric co-morbidity  [ ] Sleep apnea  [ ] COPD  [ ] BMI> 35  [ ] Liver dysfunction  [ ] Renal dysfunction  [ ] CHF  [ ] Smoker  [x]  Age > 60 years    [x]  [place]  Reviewed and Copied to Chart. See below.    Plan of care and goal oriented pain management treatment options were discussed with patient and /or primary care giver; all questions and concerns were addressed and care was aligned with patient's wishes.    Educated patient on goal oriented pain management treatment options

## 2022-10-20 NOTE — PROGRESS NOTE ADULT - SUBJECTIVE AND OBJECTIVE BOX
Patient is a 89y old  Female who presents with a chief complaint of R DOMONIQUE dislocation (19 Oct 2022 17:46)    pt seen in ed [  ], reg med floor [ x  ], bed [ x ], chair at bedside [   ], awake and responsive [ x ], lethargic [  ],    nad [  ]        Allergies    No Known Allergies        Vitals    T(F): 97.9 (10-20-22 @ 05:10), Max: 98.7 (10-19-22 @ 13:49)  HR: 81 (10-20-22 @ 05:10) (81 - 94)  BP: 142/68 (10-20-22 @ 05:10) (140/72 - 152/74)  RR: 18 (10-20-22 @ 05:10) (18 - 20)  SpO2: 100% (10-20-22 @ 05:10) (98% - 100%)  Wt(kg): --  CAPILLARY BLOOD GLUCOSE      POCT Blood Glucose.: 92 mg/dL (20 Oct 2022 05:30)      Labs                          10.7   8.27  )-----------( 209      ( 19 Oct 2022 08:18 )             34.2       10-19    142  |  107  |  23<H>  ----------------------------<  93  4.0   |  29  |  0.54    Ca    9.0      19 Oct 2022 08:18  Phos  3.4     10-19  Mg     2.2     10-19              Clean Catch Clean Catch (Midstream)  10-16 @ 01:53   >100,000 CFU/ml Escherichia coli  >100,000 CFU/ml Escherichia coli ESBL  --  Escherichia coli ESBL  Escherichia coli          Radiology Results      Meds    MEDICATIONS  (STANDING):  dextrose 5% + sodium chloride 0.9%. 1000 milliLiter(s) (50 mL/Hr) IV Continuous <Continuous>  heparin   Injectable 5000 Unit(s) SubCutaneous every 12 hours  lidocaine   4% Patch 1 Patch Transdermal every 24 hours  pantoprazole    Tablet 40 milliGRAM(s) Oral before breakfast      MEDICATIONS  (PRN):  morphine  - Injectable 1 milliGRAM(s) IV Push once PRN Severe Pain (7 - 10)      Physical Exam    Neuro :  no focal deficits  Respiratory: CTA B/L  CV: RRR, S1S2, no murmurs,   Abdominal: Soft, NT, ND +BS,  Extremities: No edema, + peripheral pulses, right lower extrem shortened and internally rotated    ASSESSMENT    right posterior dislocation of DOMONIQUE  s/p fall,   uti  h/o arthritis,   Depression,   scoliosis,   dementia         PLAN    ortho f/u   needs open reduction and revision surgery- will attempt to find components and more information regarding the implants,   pt is NVI to the extremity without any compromise  DVT prophylaxis  NWB to RLE  cont lidocaine patch, toradol prn,   pain mgmt eval  cardio f/u   echo with EF = 55 to 60%. Grade I diastolic dysfunction. RV systolic pressure is moderately increased noted above   Pt at moderate risk for yared-operative cardiac complication for the proposed hip surgery.  rocephin 1 gram daily  ucx with e coli   f/u sens   cont current meds             Patient is a 89y old  Female who presents with a chief complaint of R DOMONIQUE dislocation (19 Oct 2022 17:46)    pt seen in ed [  ], reg med floor [ x  ], bed [ x ], chair at bedside [   ], awake and responsive [ x ], lethargic [  ],    nad [  ]        Allergies    No Known Allergies        Vitals    T(F): 97.9 (10-20-22 @ 05:10), Max: 98.7 (10-19-22 @ 13:49)  HR: 81 (10-20-22 @ 05:10) (81 - 94)  BP: 142/68 (10-20-22 @ 05:10) (140/72 - 152/74)  RR: 18 (10-20-22 @ 05:10) (18 - 20)  SpO2: 100% (10-20-22 @ 05:10) (98% - 100%)  Wt(kg): --  CAPILLARY BLOOD GLUCOSE      POCT Blood Glucose.: 92 mg/dL (20 Oct 2022 05:30)      Labs                          10.7   8.27  )-----------( 209      ( 19 Oct 2022 08:18 )             34.2       10-19    142  |  107  |  23<H>  ----------------------------<  93  4.0   |  29  |  0.54    Ca    9.0      19 Oct 2022 08:18  Phos  3.4     10-19  Mg     2.2     10-19              Clean Catch Clean Catch (Midstream)  10-16 @ 01:53   >100,000 CFU/ml Escherichia coli  >100,000 CFU/ml Escherichia coli ESBL  --  Escherichia coli ESBL  Escherichia coli  - Amikacin: S <=16   - Amikacin: S <=16   - Amoxicillin/Clavulanic Acid: S <=8/4   - Amoxicillin/Clavulanic Acid: S <=8/4 Consider reserving for cystitis when ampicillin/sulbactam is resistant   - Ampicillin: R >16 These ampicillin results predict results for amoxicillin   - Ampicillin: R >16 These ampicillin results predict results for amoxicillin   - Ampicillin/Sulbactam: S 8/4 Enterobacter, Klebsiella aerogenes, Citrobacter, and Serratia may develop resistance during prolonged therapy (3-4 days)   - Ampicillin/Sulbactam: R >16/8 Enterobacter, Klebsiella aerogenes, Citrobacter, and Serratia may develop resistance during prolonged therapy (3-4 days)   - Aztreonam: R >16   - Aztreonam: S <=4   - Cefazolin: S <=2 For uncomplicated UTI with K. pneumoniae, E. coli, or P. mirablis: BORIS <=16 is sensitive and BORIS >=32 is resistant. This also predicts results for oral agents cefaclor, cefdinir, cefpodoxime, cefprozil, cefuroxime axetil, cephalexin and locarbef for uncomplicated UTI. Note that some isolates may be susceptible to these agents while testing resistant to cefazolin.   - Cefazolin: R >16 For uncomplicated UTI with K. pneumoniae, E. coli, or P. mirablis: BORIS <=16 is sensitive and BORIS >=32 is resistant. This also predicts results for oral agents cefaclor, cefdinir, cefpodoxime, cefprozil, cefuroxime axetil, cephalexin and locarbef for uncomplicated UTI. Note that some isolates may be susceptible to these agents while testing resistant to cefazolin.   - Cefepime: R >16   - Cefepime: S <=2   - Cefoxitin: S <=8   - Ceftriaxone: S <=1 Enterobacter, Klebsiella aerogenes, Citrobacter, and Serratia may develop resistance during prolonged therapy   - Ceftriaxone: R >32 Enterobacter, Klebsiella aerogenes, Citrobacter, and Serratia may develop resistance during prolonged therapy   - Ciprofloxacin: R >2   - Ciprofloxacin: S <=0.25   - Ertapenem: S <=0.5   - Ertapenem: S <=0.5   - Gentamicin: R >8   - Gentamicin: R >8   - Imipenem: S <=1   - Imipenem: S <=1   - Levofloxacin: R >4   - Levofloxacin: S <=0.5   - Meropenem: S <=1   - Meropenem: S <=1   - Nitrofurantoin: S <=32 Should not be used to treat pyelonephritis   - Nitrofurantoin: S <=32 Should not be used to treat pyelonephritis   - Piperacillin/Tazobactam: S <=8   - Piperacillin/Tazobactam: S 16   - Tigecycline: S <=2   - Tigecycline: S <=2   - Tobramycin: I 8   - Tobramycin: I 8   - Trimethoprim/Sulfamethoxazole: S <=0.5/9.5   - Trimethoprim/Sulfamethoxazole: R >2/38         Radiology Results      Meds    MEDICATIONS  (STANDING):  dextrose 5% + sodium chloride 0.9%. 1000 milliLiter(s) (50 mL/Hr) IV Continuous <Continuous>  heparin   Injectable 5000 Unit(s) SubCutaneous every 12 hours  lidocaine   4% Patch 1 Patch Transdermal every 24 hours  pantoprazole    Tablet 40 milliGRAM(s) Oral before breakfast      MEDICATIONS  (PRN):  morphine  - Injectable 1 milliGRAM(s) IV Push once PRN Severe Pain (7 - 10)      Physical Exam    Neuro :  no focal deficits  Respiratory: CTA B/L  CV: RRR, S1S2, no murmurs,   Abdominal: Soft, NT, ND +BS,  Extremities: No edema, + peripheral pulses, right lower extrem shortened and internally rotated    ASSESSMENT    right posterior dislocation of DOMONIQUE  s/p fall,   uti  h/o arthritis,   Depression,   scoliosis,   dementia         PLAN    ortho f/u   pt to have closed possible open reduction possible revision surgery of the R DOMONIQUE dislocation  -  D/w pts HCP-Christenjadyn Reynoso at [phone] she does not know any information about the previous surgeons/surgeries- although consents to this surgery  -  D/w medical team who state patient is optimized for surgery  -  Pain control  -  DVT prophylaxis  -  NWB to RLE  -  Keep pt NPO past midnight   -  Plan for OR tomorrow- On schedule for 5PM  -  2 units PRBC on hold for OR  cont lidocaine patch, toradol prn,   pain mgmt eval  cardio f/u   echo with EF = 55 to 60%. Grade I diastolic dysfunction. RV systolic pressure is moderately increased noted    Pt at moderate risk for yared-operative cardiac complication for the proposed hip surgery.  ucx and sens with esbl e coli   d/c rocephin   start ertapenem   id cons  cont current meds

## 2022-10-20 NOTE — PROGRESS NOTE ADULT - PROBLEM SELECTOR PLAN 3
pt with UTI, UA positive for ESBL Ecoli   started on ceftriaxone rocephin and switched to Ertapenem.

## 2022-10-20 NOTE — PROGRESS NOTE ADULT - SUBJECTIVE AND OBJECTIVE BOX
89yFemale    Patient was seen and evaluated at bedside. Pt has hx of dementia although answers questions today, Patient with no acute complaints.   Denies CP/SOB, dyspnea, paresthesias, N/V/D, palpitations.     ICU Vital Signs Last 24 Hrs  T(C): 36.6 (20 Oct 2022 05:10), Max: 37.1 (19 Oct 2022 13:49)  T(F): 97.9 (20 Oct 2022 05:10), Max: 98.7 (19 Oct 2022 13:49)  HR: 81 (20 Oct 2022 05:10) (81 - 94)  BP: 142/68 (20 Oct 2022 05:10) (140/72 - 152/74)  BP(mean): 99 (19 Oct 2022 13:49) (99 - 99)  ABP: --  ABP(mean): --  RR: 18 (20 Oct 2022 05:10) (18 - 20)  SpO2: 100% (20 Oct 2022 05:10) (98% - 100%)    O2 Parameters below as of 20 Oct 2022 05:10  Patient On (Oxygen Delivery Method): nasal cannula  O2 Flow (L/min): 2          General: NAD, resting comfortably in bed.    R Hip:  leg is shortened flexed and internally rotated, Old lateral incision scar noted to be healed, (+) minimal ecchymosis noted laterally, Skin is pink and warm and intact. (+)TTP to hip region. Decreased ROM of the affected hip due to pain. SILT.   Lower extremity:  No calf tenderness, calves are soft. 2+pulses. NVI. (+)EHL/FHL/ADF/APF.  Good capillary refill. Warm, well-perfused. SILT.compartments soft/compressible.                                      10.7   8.27  )-----------( 209      ( 19 Oct 2022 08:18 )             34.2   10-19    142  |  107  |  23<H>  ----------------------------<  93  4.0   |  29  |  0.54    Ca    9.0      19 Oct 2022 08:18  Phos  3.4     10-19  Mg     2.2     10-19        Impression:  89 y/o Female w/ dislocation of R constrained DOMONIQUE  Plan:  -  D/w Dr. Geronimo- Recommends surgical intervention-closed possible open reduction possible revision surgery of the R DOMONIQUE dislocation  -  Pain control  -  DVT prophylaxis  -  NWB to RLE  -  Keep pt NPO  -  Plan for OR - On schedule for 5PM  -  Consent to be obtained from HCP-Christen Reynoso at [phone]  -  2 units PRBC on hold for OR

## 2022-10-20 NOTE — CONSULT NOTE ADULT - PROBLEM SELECTOR RECOMMENDATION 2
Echo noted  Monitor Oxygen sat level  Oxygen supp. prn  Duoneb nebulizer solution   Amlodipine 2.5 mg PO Echo noted  Monitor Oxygen sat level  Oxygen supp. prn  Duoneb nebulizer solution   Amlodipine 2.5 mg PO  Stable for surgery with moderate risk from pulmonary standpoint

## 2022-10-21 DIAGNOSIS — Z02.9 ENCOUNTER FOR ADMINISTRATIVE EXAMINATIONS, UNSPECIFIED: ICD-10-CM

## 2022-10-21 DIAGNOSIS — G89.18 OTHER ACUTE POSTPROCEDURAL PAIN: ICD-10-CM

## 2022-10-21 LAB
ANION GAP SERPL CALC-SCNC: 4 MMOL/L — LOW (ref 5–17)
BASOPHILS # BLD AUTO: 0.06 K/UL — SIGNIFICANT CHANGE UP (ref 0–0.2)
BASOPHILS NFR BLD AUTO: 0.6 % — SIGNIFICANT CHANGE UP (ref 0–2)
BUN SERPL-MCNC: 17 MG/DL — SIGNIFICANT CHANGE UP (ref 7–18)
CALCIUM SERPL-MCNC: 8.5 MG/DL — SIGNIFICANT CHANGE UP (ref 8.4–10.5)
CHLORIDE SERPL-SCNC: 104 MMOL/L — SIGNIFICANT CHANGE UP (ref 96–108)
CO2 SERPL-SCNC: 34 MMOL/L — HIGH (ref 22–31)
CREAT SERPL-MCNC: 0.55 MG/DL — SIGNIFICANT CHANGE UP (ref 0.5–1.3)
EGFR: 84 ML/MIN/1.73M2 — SIGNIFICANT CHANGE UP
EOSINOPHIL # BLD AUTO: 0.28 K/UL — SIGNIFICANT CHANGE UP (ref 0–0.5)
EOSINOPHIL NFR BLD AUTO: 2.7 % — SIGNIFICANT CHANGE UP (ref 0–6)
GLUCOSE SERPL-MCNC: 73 MG/DL — SIGNIFICANT CHANGE UP (ref 70–99)
HCT VFR BLD CALC: 34.5 % — SIGNIFICANT CHANGE UP (ref 34.5–45)
HGB BLD-MCNC: 10.5 G/DL — LOW (ref 11.5–15.5)
IMM GRANULOCYTES NFR BLD AUTO: 0.4 % — SIGNIFICANT CHANGE UP (ref 0–0.9)
LYMPHOCYTES # BLD AUTO: 1.59 K/UL — SIGNIFICANT CHANGE UP (ref 1–3.3)
LYMPHOCYTES # BLD AUTO: 15.2 % — SIGNIFICANT CHANGE UP (ref 13–44)
MAGNESIUM SERPL-MCNC: 1.9 MG/DL — SIGNIFICANT CHANGE UP (ref 1.6–2.6)
MCHC RBC-ENTMCNC: 30.4 GM/DL — LOW (ref 32–36)
MCHC RBC-ENTMCNC: 31.6 PG — SIGNIFICANT CHANGE UP (ref 27–34)
MCV RBC AUTO: 103.9 FL — HIGH (ref 80–100)
MONOCYTES # BLD AUTO: 0.77 K/UL — SIGNIFICANT CHANGE UP (ref 0–0.9)
MONOCYTES NFR BLD AUTO: 7.4 % — SIGNIFICANT CHANGE UP (ref 2–14)
NEUTROPHILS # BLD AUTO: 7.72 K/UL — HIGH (ref 1.8–7.4)
NEUTROPHILS NFR BLD AUTO: 73.7 % — SIGNIFICANT CHANGE UP (ref 43–77)
NRBC # BLD: 0 /100 WBCS — SIGNIFICANT CHANGE UP (ref 0–0)
PHOSPHATE SERPL-MCNC: 3.5 MG/DL — SIGNIFICANT CHANGE UP (ref 2.5–4.5)
PLATELET # BLD AUTO: 219 K/UL — SIGNIFICANT CHANGE UP (ref 150–400)
POTASSIUM SERPL-MCNC: 4.1 MMOL/L — SIGNIFICANT CHANGE UP (ref 3.5–5.3)
POTASSIUM SERPL-SCNC: 4.1 MMOL/L — SIGNIFICANT CHANGE UP (ref 3.5–5.3)
RBC # BLD: 3.32 M/UL — LOW (ref 3.8–5.2)
RBC # FLD: 12.2 % — SIGNIFICANT CHANGE UP (ref 10.3–14.5)
SODIUM SERPL-SCNC: 142 MMOL/L — SIGNIFICANT CHANGE UP (ref 135–145)
WBC # BLD: 10.46 K/UL — SIGNIFICANT CHANGE UP (ref 3.8–10.5)
WBC # FLD AUTO: 10.46 K/UL — SIGNIFICANT CHANGE UP (ref 3.8–10.5)

## 2022-10-21 PROCEDURE — 99232 SBSQ HOSP IP/OBS MODERATE 35: CPT | Mod: FS

## 2022-10-21 RX ORDER — SODIUM CHLORIDE 9 MG/ML
1000 INJECTION INTRAMUSCULAR; INTRAVENOUS; SUBCUTANEOUS
Refills: 0 | Status: DISCONTINUED | OUTPATIENT
Start: 2022-10-21 | End: 2022-10-27

## 2022-10-21 RX ORDER — SODIUM CHLORIDE 9 MG/ML
500 INJECTION INTRAMUSCULAR; INTRAVENOUS; SUBCUTANEOUS ONCE
Refills: 0 | Status: COMPLETED | OUTPATIENT
Start: 2022-10-21 | End: 2022-10-21

## 2022-10-21 RX ADMIN — Medication 3 MILLILITER(S): at 20:29

## 2022-10-21 RX ADMIN — OXYCODONE HYDROCHLORIDE 2.5 MILLIGRAM(S): 5 TABLET ORAL at 12:21

## 2022-10-21 RX ADMIN — Medication 3 MILLILITER(S): at 08:57

## 2022-10-21 RX ADMIN — ENOXAPARIN SODIUM 30 MILLIGRAM(S): 100 INJECTION SUBCUTANEOUS at 06:10

## 2022-10-21 RX ADMIN — SENNA PLUS 2 TABLET(S): 8.6 TABLET ORAL at 21:06

## 2022-10-21 RX ADMIN — SODIUM CHLORIDE 1000 MILLILITER(S): 9 INJECTION INTRAMUSCULAR; INTRAVENOUS; SUBCUTANEOUS at 22:43

## 2022-10-21 RX ADMIN — OXYCODONE HYDROCHLORIDE 5 MILLIGRAM(S): 5 TABLET ORAL at 23:35

## 2022-10-21 RX ADMIN — LIDOCAINE 1 PATCH: 4 CREAM TOPICAL at 17:46

## 2022-10-21 RX ADMIN — OXYCODONE HYDROCHLORIDE 5 MILLIGRAM(S): 5 TABLET ORAL at 22:34

## 2022-10-21 RX ADMIN — Medication 1000 MILLIGRAM(S): at 06:06

## 2022-10-21 RX ADMIN — ERTAPENEM SODIUM 120 MILLIGRAM(S): 1 INJECTION, POWDER, LYOPHILIZED, FOR SOLUTION INTRAMUSCULAR; INTRAVENOUS at 11:22

## 2022-10-21 RX ADMIN — ENOXAPARIN SODIUM 30 MILLIGRAM(S): 100 INJECTION SUBCUTANEOUS at 19:10

## 2022-10-21 RX ADMIN — Medication 1000 MILLIGRAM(S): at 05:31

## 2022-10-21 RX ADMIN — Medication 100 MILLIGRAM(S): at 02:22

## 2022-10-21 RX ADMIN — LIDOCAINE 1 PATCH: 4 CREAM TOPICAL at 19:11

## 2022-10-21 RX ADMIN — AMLODIPINE BESYLATE 2.5 MILLIGRAM(S): 2.5 TABLET ORAL at 05:32

## 2022-10-21 RX ADMIN — Medication 1000 MILLIGRAM(S): at 21:06

## 2022-10-21 RX ADMIN — Medication 1000 MILLIGRAM(S): at 14:31

## 2022-10-21 RX ADMIN — Medication 3 MILLILITER(S): at 14:55

## 2022-10-21 RX ADMIN — OXYCODONE HYDROCHLORIDE 2.5 MILLIGRAM(S): 5 TABLET ORAL at 11:25

## 2022-10-21 RX ADMIN — Medication 1 TABLET(S): at 12:02

## 2022-10-21 RX ADMIN — Medication 1000 MILLIGRAM(S): at 22:15

## 2022-10-21 RX ADMIN — LIDOCAINE 1 PATCH: 4 CREAM TOPICAL at 06:06

## 2022-10-21 RX ADMIN — Medication 100 MILLIGRAM(S): at 12:00

## 2022-10-21 RX ADMIN — Medication 1000 MILLIGRAM(S): at 13:36

## 2022-10-21 RX ADMIN — PANTOPRAZOLE SODIUM 40 MILLIGRAM(S): 20 TABLET, DELAYED RELEASE ORAL at 06:10

## 2022-10-21 RX ADMIN — LIDOCAINE 1 PATCH: 4 CREAM TOPICAL at 10:00

## 2022-10-21 NOTE — PROGRESS NOTE ADULT - ASSESSMENT
Confidential Drug Utilization Report  Search Terms: Maria M Hopson, 11/29/1926Search Date: 10/20/2022 09:35:36 AM  The Drug Utilization Report below displays all of the controlled substance prescriptions, if any, that your patient has filled in the last twelve months. The information displayed on this report is compiled from pharmacy submissions to the Department, and accurately reflects the information as submitted by the pharmacies.    This report was requested by: Prachi Zayas | Reference #: [id number]    There are no results for the search terms that you entered.

## 2022-10-21 NOTE — PROGRESS NOTE ADULT - SUBJECTIVE AND OBJECTIVE BOX
NP Note discussed with  Primary Attending    Patient is a 89y old  Female who presents with a chief complaint of S/P Fall (20 Oct 2022 12:17)      INTERVAL HPI/OVERNIGHT EVENTS: no new complaints    MEDICATIONS  (STANDING):  acetaminophen     Tablet .. 1000 milliGRAM(s) Oral every 8 hours  ALBUTerol    90 MICROgram(s) HFA Inhaler      albuterol/ipratropium for Nebulization 3 milliLiter(s) Nebulizer every 6 hours  amLODIPine   Tablet 2.5 milliGRAM(s) Oral daily  ceFAZolin   IVPB 1000 milliGRAM(s) IV Intermittent every 8 hours  dextrose 5% + sodium chloride 0.9%. 1000 milliLiter(s) (50 mL/Hr) IV Continuous <Continuous>  enoxaparin Injectable 30 milliGRAM(s) SubCutaneous every 12 hours  ertapenem  IVPB      ertapenem  IVPB 1000 milliGRAM(s) IV Intermittent every 24 hours  lidocaine   4% Patch 1 Patch Transdermal every 24 hours  multivitamin 1 Tablet(s) Oral daily  pantoprazole    Tablet 40 milliGRAM(s) Oral before breakfast  polyethylene glycol 3350 17 Gram(s) Oral at bedtime  senna 2 Tablet(s) Oral at bedtime    MEDICATIONS  (PRN):  magnesium hydroxide Suspension 30 milliLiter(s) Oral daily PRN Constipation  morphine  - Injectable 1 milliGRAM(s) IV Push once PRN Severe Pain (7 - 10)  ondansetron Injectable 4 milliGRAM(s) IV Push every 6 hours PRN Nausea and/or Vomiting  oxyCODONE    IR 2.5 milliGRAM(s) Oral every 4 hours PRN Moderate Pain (4 - 6)  oxyCODONE    IR 5 milliGRAM(s) Oral every 6 hours PRN Severe Pain (7 - 10)      __________________________________________________  REVIEW OF SYSTEMS:    CONSTITUTIONAL: No fever,   EYES: no acute visual disturbances  NECK: No pain or stiffness  RESPIRATORY: No cough; No shortness of breath  CARDIOVASCULAR: No chest pain, no palpitations  GASTROINTESTINAL: No pain. No nausea or vomiting; No diarrhea   NEUROLOGICAL: No headache or numbness, no tremors  MUSCULOSKELETAL: No joint pain, no muscle pain  GENITOURINARY: no dysuria, no frequency, no hesitancy  PSYCHIATRY: no depression , no anxiety  ALL OTHER  ROS negative        Vital Signs Last 24 Hrs  T(C): 36.8 (21 Oct 2022 05:00), Max: 37 (20 Oct 2022 13:57)  T(F): 98.3 (21 Oct 2022 05:00), Max: 98.6 (20 Oct 2022 13:57)  HR: 97 (21 Oct 2022 05:00) (71 - 105)  BP: 151/72 (21 Oct 2022 05:00) (104/79 - 160/59)  BP(mean): 74 (20 Oct 2022 20:54) (74 - 91)  RR: 18 (21 Oct 2022 05:00) (18 - 23)  SpO2: 98% (21 Oct 2022 05:00) (92% - 100%)    Parameters below as of 21 Oct 2022 05:00  Patient On (Oxygen Delivery Method): nasal cannula  O2 Flow (L/min): 2      ________________________________________________  PHYSICAL EXAM:  GENERAL: NAD  HEENT: Normocephalic;  conjunctivae and sclerae clear; moist mucous membranes;   NECK : supple  CHEST/LUNG: Clear to auscultation bilaterally with good air entry   HEART: S1 S2  regular; no murmurs, gallops or rubs  ABDOMEN: Soft, Nontender, Nondistended; Bowel sounds present  EXTREMITIES: no cyanosis; no edema; no calf tenderness  SKIN: warm and dry; no rash  NERVOUS SYSTEM:  Awake and alert; Oriented  to place, person and time ; no new deficits    _________________________________________________  LABS:                        10.5   10.46 )-----------( 219      ( 21 Oct 2022 05:25 )             34.5     10-21    142  |  104  |  17  ----------------------------<  73  4.1   |  34<H>  |  0.55    Ca    8.5      21 Oct 2022 05:25  Phos  3.5     10-21  Mg     1.9     10-21          CAPILLARY BLOOD GLUCOSE      POCT Blood Glucose.: 87 mg/dL (20 Oct 2022 23:56)        RADIOLOGY & ADDITIONAL TESTS:    Imaging  Reviewed:  YES/NO    Consultant(s) Notes Reviewed:   YES/ No      Plan of care was discussed with patient and /or primary care giver; all questions and concerns were addressed  NP Note discussed with  Primary Attending    Patient is a 89y old  Female who presents with a chief complaint of S/P Fall (20 Oct 2022 12:17)      INTERVAL HPI/OVERNIGHT EVENTS: no new complaints    MEDICATIONS  (STANDING):  acetaminophen     Tablet .. 1000 milliGRAM(s) Oral every 8 hours  ALBUTerol    90 MICROgram(s) HFA Inhaler      albuterol/ipratropium for Nebulization 3 milliLiter(s) Nebulizer every 6 hours  amLODIPine   Tablet 2.5 milliGRAM(s) Oral daily  ceFAZolin   IVPB 1000 milliGRAM(s) IV Intermittent every 8 hours  dextrose 5% + sodium chloride 0.9%. 1000 milliLiter(s) (50 mL/Hr) IV Continuous <Continuous>  enoxaparin Injectable 30 milliGRAM(s) SubCutaneous every 12 hours  ertapenem  IVPB      ertapenem  IVPB 1000 milliGRAM(s) IV Intermittent every 24 hours  lidocaine   4% Patch 1 Patch Transdermal every 24 hours  multivitamin 1 Tablet(s) Oral daily  pantoprazole    Tablet 40 milliGRAM(s) Oral before breakfast  polyethylene glycol 3350 17 Gram(s) Oral at bedtime  senna 2 Tablet(s) Oral at bedtime    MEDICATIONS  (PRN):  magnesium hydroxide Suspension 30 milliLiter(s) Oral daily PRN Constipation  morphine  - Injectable 1 milliGRAM(s) IV Push once PRN Severe Pain (7 - 10)  ondansetron Injectable 4 milliGRAM(s) IV Push every 6 hours PRN Nausea and/or Vomiting  oxyCODONE    IR 2.5 milliGRAM(s) Oral every 4 hours PRN Moderate Pain (4 - 6)  oxyCODONE    IR 5 milliGRAM(s) Oral every 6 hours PRN Severe Pain (7 - 10)      __________________________________________________  REVIEW OF SYSTEMS:    CONSTITUTIONAL: No fever,   EYES: no acute visual disturbances  NECK: No pain or stiffness  RESPIRATORY: No cough; No shortness of breath  CARDIOVASCULAR: No chest pain, no palpitations  GASTROINTESTINAL: No pain. No nausea or vomiting; No diarrhea   NEUROLOGICAL: No headache or numbness, no tremors  MUSCULOSKELETAL: right hip tenderness, no muscle pain  GENITOURINARY: no dysuria, no frequency, no hesitancy  PSYCHIATRY: no depression , no anxiety  ALL OTHER  ROS negative        Vital Signs Last 24 Hrs  T(C): 36.8 (21 Oct 2022 05:00), Max: 37 (20 Oct 2022 13:57)  T(F): 98.3 (21 Oct 2022 05:00), Max: 98.6 (20 Oct 2022 13:57)  HR: 97 (21 Oct 2022 05:00) (71 - 105)  BP: 151/72 (21 Oct 2022 05:00) (104/79 - 160/59)  BP(mean): 74 (20 Oct 2022 20:54) (74 - 91)  RR: 18 (21 Oct 2022 05:00) (18 - 23)  SpO2: 98% (21 Oct 2022 05:00) (92% - 100%)    Parameters below as of 21 Oct 2022 05:00  Patient On (Oxygen Delivery Method): nasal cannula  O2 Flow (L/min): 2      ________________________________________________  PHYSICAL EXAM:  GENERAL: NAD  HEENT: Normocephalic;  conjunctivae and sclerae clear; moist mucous membranes;   NECK : supple  CHEST/LUNG: Clear to auscultation bilaterally with good air entry   HEART: S1 S2  regular; no murmurs, gallops or rubs  ABDOMEN: Soft, Nontender, Nondistended; Bowel sounds present  EXTREMITIES: no cyanosis; no edema; no calf tenderness  SKIN: warm dry and flaky; no rash  NERVOUS SYSTEM:  Awake and alert; Oriented  to place, person and time ; no new deficits  _________________________________________________  LABS:                          10.5   10.46 )-----------( 219      ( 21 Oct 2022 05:25 )             34.5     10-21    142  |  104  |  17  ----------------------------<  73  4.1   |  34<H>  |  0.55    Ca    8.5      21 Oct 2022 05:25  Phos  3.5     10-21  Mg     1.9     10-21          CAPILLARY BLOOD GLUCOSE      POCT Blood Glucose.: 87 mg/dL (20 Oct 2022 23:56)        RADIOLOGY & ADDITIONAL TESTS:    Imaging  Reviewed:    < from: CT Cervical Spine No Cont (10.15.22 @ 11:40) >  CT HEAD: Mild periventricular white matter ischemia.Tiny indeterminate   lacunar infarction in the anterior limb of the LEFT internal capsule.   Mild to moderate cerebellar and temporal lobe atrophy.    CT cervical spine:   No vertebral fracture is recognized.  Straightening   with grade 1 anterior spondylolisthesis at C2-3, C3-4 and C7-T1 on a   degenerative basis.    Advanced degenerative disc disease and spondylosis   at C3-4 C7-T1 with narrowing of the BILATERAL C2-3 through C6-7 neural   foramina due to uncovertebral spurring and facet osteophytic hypertrophy.   Degenerative cord impingement is seen at C4-5 through C6-7 due to   posterior osteophytic ridge/disc complexes.    < end of copied text >  < from: CT Head No Cont (10.15.22 @ 11:39) >  Interstitial fibrosis is seen at the RIGHT lung apex.      IMPRESSION:    CT HEAD: Mild periventricular white matter ischemia.Tiny indeterminate   lacunar infarction in the anterior limb of the LEFT internal capsule.   Mild to moderate cerebellar and temporal lobe atrophy.    CT cervical spine:   No vertebral fracture is recognized.  Straightening   with grade 1 anterior spondylolisthesis at C2-3, C3-4 and C7-T1 on a   degenerative basis.    Advanced degenerative disc disease and spondylosis   at C3-4 C7-T1 with narrowing of the BILATERAL C2-3 through C6-7 neural   foramina due to uncovertebral spurring and facet osteophytic hypertrophy.   Degenerative cord impingement is seen at C4-5 through C6-7 due to   posterior osteophytic ridge/disc complexes.      < end of copied text >  < from: Xray Pelvis AP only (10.15.22 @ 11:33) >  IMPRESSION:    Dislocation femoral component of right hip prosthesis.    --- End of Report ---      < end of copied text >      Consultant(s) Notes Reviewed:   YES    Plan of care was discussed with patient and /or primary care giver; all questions and concerns were addressed

## 2022-10-21 NOTE — PHYSICAL THERAPY INITIAL EVALUATION ADULT - AMBULATION SKILLS, REHAB EVAL
independent/needs device
Apixaban/Eliquis increases your risk for bleeding. Notify your doctor if you experience any of the following side effects: bleeding, coughing or vomiting blood, red or black stool, unexpected pain or swelling, itching or hives, chest pain, chest tightness, trouble breathing, changes in how much or how often you urinate, red or pink urine, numbness or tingling in your feet, or unusual muscle weakness. When Apixaban/Eliquis is taken with other medicines, they can affect how it works. Taking other medications such as aspirin, blood thinners, nonsteroidal anti-inflammatories, and medications that treat depression can increase your risk of bleeding. It is very important to tell your health care provider about all of the other medicines, including over-the-counter medications, herbs, and vitamins you are taking. DO NOT start, stop, or change the dosage of any medicine, including over-the-counter medicines, vitamins, and herbal products without your doctor’s approval. Any products containing aspirin or are nonsteroidal anti-inflammatories lessen the blood’s ability to form clots and add to the effect of Apixaban/Eliquis. Never take aspirin or medicines that contain aspirin without speaking to your doctor.

## 2022-10-21 NOTE — PHYSICAL THERAPY INITIAL EVALUATION ADULT - PERTINENT HX OF CURRENT PROBLEM, REHAB EVAL
89 year old female, from home, hard of hearing, lives alone,  with PMHx of arthritis, Depression, scoliosis, dementia presents after a fall. Patient states that she slipped and fell landing on her right side.  S/p Open reduction of Right Total Hip dislocation on 10/20/22

## 2022-10-21 NOTE — CONSULT NOTE ADULT - ASSESSMENT
89 y/o Female with dislocation of R DOMONIQUE
 89 year old female, from home, hard of hearing, lives alone,  with PMHx of arthritis, Depression, scoliosis, dementia presents after a fall. Patient states that she slipped and fell landing on her right side with hip dislocation.  1.Echocardiogram.  2.Ortho eval.  3.UTI-u cx,Abx.  4.Pain control.  5.GI and DVT prophylaxis.
Confidential Drug Utilization Report  Search Terms: Maria M Hopson, 11/29/1926Search Date: 10/20/2022 09:35:36 AM  The Drug Utilization Report below displays all of the controlled substance prescriptions, if any, that your patient has filled in the last twelve months. The information displayed on this report is compiled from pharmacy submissions to the Department, and accurately reflects the information as submitted by the pharmacies.    This report was requested by: Prachi Zayas | Reference #: [id number]    There are no results for the search terms that you entered.
  Patient is a 89y old  Female, from home, hard of hearing, lives alone,  with PMHx of arthritis, Depression, scoliosis, dementia,  now presents to the ER on 10/15/22 for evaluation of a fall. Patient states that she slipped and fell landing on her right side. Patient denies any head trauma or LOC but endorses right hip pain. ON admission, she found to have no fever, but tachycardia., Leukocytosis and positive urine analysis. The Xray of pelvis shows Dislocation femoral component of right hip prosthesis. She underwent Open reduction of Right hip on 10/20/22. The ID consult requested today, 10/21/22, to assist with antibiotic treatment for ESBL E.coli UTI.    # Sepsis ( Tachycardia + Leukocytosis) on admission- resolved  # UTI - 10/16 and Urine Cx grew ESBL  E.coli  # S/p Fall-  Xray of pelvis shows Dislocation femoral component of right hip prosthesis.- s/p Open reduction on 10/20/22    would recommend:    1. Continue Ertapenem to cover ESBL  E.coli in urine  2. Monitor kidney function and IVF  3. Pain management as needed  4. Wound care as per Ortho    will follow the patient with you and make further recommendation based on the clinical course and Lab results  Thank you for the opportunity to participate in Mr. LOMAX's care      Attending Attestation:    Spent more than 65 minutes on total encounter, more than 50 % of the visit was spent counseling and/or coordinating care by the Attending physician.

## 2022-10-21 NOTE — CONSULT NOTE ADULT - CONSULT REQUESTED DATE/TIME
20-Oct-2022 11:02
16-Oct-2022 12:28
20-Oct-2022 09:36
21-Oct-2022 13:34
16-Oct-2022 13:11
Kirby Rosario DO (Attending): The history, relevant review of systems, past medical and surgical history, medical decision making, and physical examination was documented by the scribe in my presence and I attest to the accuracy of the documentation.

## 2022-10-21 NOTE — PROGRESS NOTE ADULT - PROBLEM SELECTOR PLAN 1
postop day 1 of hip reduction  dressing changes per ortho  awaiting PT eval postop day 1 of hip reduction  dressing changes per ortho  PT recommending MARTHA  continue pain management  tylenol 1 gram Q8H  oxycodone 2.5/5mg prn mod/severe pain

## 2022-10-21 NOTE — PROGRESS NOTE ADULT - SUBJECTIVE AND OBJECTIVE BOX
CHIEF COMPLAINT:Patient is a 89y old  Female who presents with a chief complaint of hip dislocaztion.Pt appears comfortable.    	  REVIEW OF SYSTEMS:  CONSTITUTIONAL: No fever, weight loss, or fatigue  EYES: No eye pain, visual disturbances, or discharge  ENT:  No difficulty hearing, tinnitus, vertigo; No sinus or throat pain  NECK: No pain or stiffness  RESPIRATORY: No cough, wheezing, chills or hemoptysis; No Shortness of Breath  CARDIOVASCULAR: No chest pain, palpitations, passing out, dizziness, or leg swelling  GASTROINTESTINAL: No abdominal or epigastric pain. No nausea, vomiting, or hematemesis; No diarrhea or constipation. No melena or hematochezia.  GENITOURINARY: No dysuria, frequency, hematuria, or incontinence  NEUROLOGICAL: No headaches, memory loss, loss of strength, numbness, or tremors  SKIN: No itching, burning, rashes, or lesions   LYMPH Nodes: No enlarged glands  ENDOCRINE: No heat or cold intolerance; No hair loss  MUSCULOSKELETAL: No joint pain or swelling; No muscle, back, or extremity pain  PSYCHIATRIC: No depression, anxiety, mood swings, or difficulty sleeping  HEME/LYMPH: No easy bruising, or bleeding gums  ALLERGY AND IMMUNOLOGIC: No hives or eczema	      PHYSICAL EXAM:  T(C): 36.8 (10-21-22 @ 05:00), Max: 37 (10-20-22 @ 13:57)  HR: 82 (10-21-22 @ 10:47) (71 - 105)  BP: 151/72 (10-21-22 @ 05:00) (104/79 - 160/59)  RR: 18 (10-21-22 @ 05:00) (18 - 23)  SpO2: 98% (10-21-22 @ 10:47) (92% - 100%)  Wt(kg): --  I&O's Summary    20 Oct 2022 07:01  -  21 Oct 2022 07:00  --------------------------------------------------------  IN: 1000 mL / OUT: 1075 mL / NET: -75 mL        Appearance: Normal	  HEENT:   Normal oral mucosa, PERRL, EOMI	  Lymphatic: No lymphadenopathy  Cardiovascular: Normal S1 S2, No JVD, No murmurs, No edema  Respiratory: Lungs clear to auscultation	  Psychiatry: A & O x 3, Mood & affect appropriate  Gastrointestinal:  Soft, Non-tender, + BS	  Skin: No rashes, No ecchymoses, No cyanosis	  Neurologic: Non-focal  Extremities: Normal range of motion, No clubbing, cyanosis or edema  Vascular: Peripheral pulses palpable 2+ bilaterally    MEDICATIONS  (STANDING):  acetaminophen     Tablet .. 1000 milliGRAM(s) Oral every 8 hours  ALBUTerol    90 MICROgram(s) HFA Inhaler      albuterol/ipratropium for Nebulization 3 milliLiter(s) Nebulizer every 6 hours  amLODIPine   Tablet 2.5 milliGRAM(s) Oral daily  ceFAZolin   IVPB 1000 milliGRAM(s) IV Intermittent every 8 hours  dextrose 5% + sodium chloride 0.9%. 1000 milliLiter(s) (50 mL/Hr) IV Continuous <Continuous>  enoxaparin Injectable 30 milliGRAM(s) SubCutaneous every 12 hours  ertapenem  IVPB      ertapenem  IVPB 1000 milliGRAM(s) IV Intermittent every 24 hours  lidocaine   4% Patch 1 Patch Transdermal every 24 hours  multivitamin 1 Tablet(s) Oral daily  pantoprazole    Tablet 40 milliGRAM(s) Oral before breakfast  polyethylene glycol 3350 17 Gram(s) Oral at bedtime  senna 2 Tablet(s) Oral at bedtime      	  	  LABS:	 	                                    10.5   10.46 )-----------( 219      ( 21 Oct 2022 05:25 )             34.5     10-21    142  |  104  |  17  ----------------------------<  73  4.1   |  34<H>  |  0.55    Ca    8.5      21 Oct 2022 05:25  Phos  3.5     10-21  Mg     1.9     10-21      Lipid Profile: Cholesterol 144  LDL --  HDL 50  TG 69  Ldl calc 80  Ratio --      TSH: Thyroid Stimulating Hormone, Serum: 2.66 uU/mL (10-16 @ 05:30)

## 2022-10-21 NOTE — CONSULT NOTE ADULT - SUBJECTIVE AND OBJECTIVE BOX
Patient is a 89y old  Female, from home, hard of hearing, lives alone,  with PMHx of arthritis, Depression, scoliosis, dementia presents after a fall. Patient states that she slipped and fell landing on her right side. Patient denies any head trauma or LOC but endorses right hip pain. Denies any headaches, numbness, tingling, fever, chills, nausea,  vomiting chest pain, SOB, abdominal pain, or change in bowel habits. Afebrile, hemodynamically stable   CTH: tiny indeterminate lacunar infarcts, CT spine multiple degenerative changes  Xray hip: dislocation right hip        REVIEW OF SYSTEMS: Total of twelve systems have been reviewed with patient and found to be negative unless mentioned in HPI        PAST MEDICAL & SURGICAL HISTORY:  Arthritis  No significant past surgical history        SOCIAL HISTORY  Alcohol: Does not drink  Tobacco: Does not smoke  Illicit substance use: None      FAMILY HISTORY: Non contributory to the present illness        ALLERGIES: No Known Allergies        Vital Signs Last 24 Hrs  T(C): 36.8 (21 Oct 2022 05:00), Max: 37 (20 Oct 2022 13:57)  T(F): 98.3 (21 Oct 2022 05:00), Max: 98.6 (20 Oct 2022 13:57)  HR: 82 (21 Oct 2022 10:47) (71 - 105)  BP: 151/72 (21 Oct 2022 05:00) (104/79 - 160/59)  BP(mean): 74 (20 Oct 2022 20:54) (74 - 91)  RR: 18 (21 Oct 2022 05:00) (18 - 23)  SpO2: 98% (21 Oct 2022 10:47) (92% - 100%)    Parameters below as of 21 Oct 2022 10:47  Patient On (Oxygen Delivery Method): nasal cannula  O2 Flow (L/min): 2          PHYSICAL EXAM:  GENERAL: Not in distress   CHEST/LUNG:  Aire ntry bilaterally  HEART: s1 and s2 present  ABDOMEN:  Nontender and  Nondistended  EXTREMITIES: No pedal  edema  CNS: Awake and Alert      LABS:                        10.5   10.46 )-----------( 219      ( 21 Oct 2022 05:25 )             34.5       10-21    142  |  104  |  17  ----------------------------<  73  4.1   |  34<H>  |  0.55    Ca    8.5      21 Oct 2022 05:25  Phos  3.5     10-21  Mg     1.9     10-21        CAPILLARY BLOOD GLUCOSE  POCT Blood Glucose.: 87 mg/dL (20 Oct 2022 23:56)        MEDICATIONS  (STANDING):  acetaminophen     Tablet .. 1000 milliGRAM(s) Oral every 8 hours  ALBUTerol    90 MICROgram(s) HFA Inhaler      albuterol/ipratropium for Nebulization 3 milliLiter(s) Nebulizer every 6 hours  amLODIPine   Tablet 2.5 milliGRAM(s) Oral daily  ceFAZolin   IVPB 1000 milliGRAM(s) IV Intermittent every 8 hours  dextrose 5% + sodium chloride 0.9%. 1000 milliLiter(s) (50 mL/Hr) IV Continuous <Continuous>  enoxaparin Injectable 30 milliGRAM(s) SubCutaneous every 12 hours  ertapenem  IVPB      ertapenem  IVPB 1000 milliGRAM(s) IV Intermittent every 24 hours  lidocaine   4% Patch 1 Patch Transdermal every 24 hours  multivitamin 1 Tablet(s) Oral daily  pantoprazole    Tablet 40 milliGRAM(s) Oral before breakfast  polyethylene glycol 3350 17 Gram(s) Oral at bedtime  senna 2 Tablet(s) Oral at bedtime    MEDICATIONS  (PRN):  magnesium hydroxide Suspension 30 milliLiter(s) Oral daily PRN Constipation  ondansetron Injectable 4 milliGRAM(s) IV Push every 6 hours PRN Nausea and/or Vomiting  oxyCODONE    IR 2.5 milliGRAM(s) Oral every 4 hours PRN Moderate Pain (4 - 6)  oxyCODONE    IR 5 milliGRAM(s) Oral every 6 hours PRN Severe Pain (7 - 10)        RADIOLOGY & ADDITIONAL TESTS:    < from: CT Cervical Spine No Cont (10.15.22 @ 11:40) >CT HEAD: Mild periventricular white matter ischemia.Tiny indeterminate   lacunar infarction in the anterior limb of the LEFT internal capsule. Mild to moderate cerebellar and temporal lobe atrophy.    CT cervical spine:   No vertebral fracture is recognized.  Straightening   with grade 1 anterior spondylolisthesis at C2-3, C3-4 and C7-T1 on a   degenerative basis.    Advanced degenerative disc disease and spondylosis   at C3-4 C7-T1 with narrowing of the BILATERAL C2-3 through C6-7 neural   foramina due to uncovertebral spurring and facet osteophytic hypertrophy.   Degenerative cord impingement is seen at C4-5 through C6-7 due to   posterior osteophytic ridge/disc complexes.        < end of copied text >  < from: CT Head No Cont (10.15.22 @ 11:39) >    CT HEAD: Mild periventricular white matter ischemia.Tiny indeterminate   lacunar infarction in the anterior limb of the LEFT internal capsule.   Mild to moderate cerebellar and temporal lobe atrophy.    < end of copied text >  < from: Xray Pelvis AP only (10.15.22 @ 11:33) >    Dislocation femoral component of right hip prosthesis.        MICROBIOLOGY DATA:    Culture - Urine (10.16.22 @ 01:53)   - Amikacin: S <=16   - Amikacin: S <=16   - Amoxicillin/Clavulanic Acid: S <=8/4   - Amoxicillin/Clavulanic Acid: S <=8/4 Consider reserving for cystitis when ampicillin/sulbactam is resistant   - Ampicillin: R >16 These ampicillin results predict results for amoxicillin   - Ampicillin: R >16 These ampicillin results predict results for amoxicillin   - Ampicillin/Sulbactam: S 8/4 Enterobacter, Klebsiella aerogenes, Citrobacter, and Serratia may develop resistance during prolonged therapy (3-4 days)   - Ampicillin/Sulbactam: R >16/8 Enterobacter, Klebsiella aerogenes, Citrobacter, and Serratia may develop resistance during prolonged therapy (3-4 days)   - Aztreonam: R >16   - Aztreonam: S <=4   - Cefazolin: S <=2 For uncomplicated UTI with K. pneumoniae, E. coli, or P. mirablis: BORIS <=16 is sensitive and BORIS >=32 is resistant. This also predicts results for oral agents cefaclor, cefdinir, cefpodoxime, cefprozil, cefuroxime axetil, cephalexin and locarbef for uncomplicated UTI. Note that some isolates may be susceptible to these agents while testing resistant to cefazolin.   - Cefazolin: R >16 For uncomplicated UTI with K. pneumoniae, E. coli, or P. mirablis: BORIS <=16 is sensitive and BORIS >=32 is resistant. This also predicts results for oral agents cefaclor, cefdinir, cefpodoxime, cefprozil, cefuroxime axetil, cephalexin and locarbef for uncomplicated UTI. Note that some isolates may be susceptible to these agents while testing resistant to cefazolin.   - Cefepime: R >16   - Cefepime: S <=2   - Cefoxitin: S <=8   - Ceftriaxone: S <=1 Enterobacter, Klebsiella aerogenes, Citrobacter, and Serratia may develop resistance during prolonged therapy   - Ceftriaxone: R >32 Enterobacter, Klebsiella aerogenes, Citrobacter, and Serratia may develop resistance during prolonged therapy   - Ciprofloxacin: R >2   - Ciprofloxacin: S <=0.25   - Ertapenem: S <=0.5   - Ertapenem: S <=0.5   - Gentamicin: R >8   - Gentamicin: R >8   - Imipenem: S <=1   - Imipenem: S <=1   - Levofloxacin: R >4   - Levofloxacin: S <=0.5   - Meropenem: S <=1   - Meropenem: S <=1   - Nitrofurantoin: S <=32 Should not be used to treat pyelonephritis   - Nitrofurantoin: S <=32 Should not be used to treat pyelonephritis   - Piperacillin/Tazobactam: S <=8   - Piperacillin/Tazobactam: S 16   - Tigecycline: S <=2   - Tigecycline: S <=2   - Tobramycin: I 8   - Tobramycin: I 8   - Trimethoprim/Sulfamethoxazole: S <=0.5/9.5   - Trimethoprim/Sulfamethoxazole: R >2/38   Specimen Source: Clean Catch Clean Catch (Midstream)   Culture Results:   >100,000 CFU/ml Escherichia coli   >100,000 CFU/ml Escherichia coli ESBL   Organism Identification: Escherichia coli ESBL   Escherichia coli   Organism: Escherichia coli ESBL   Organism: Escherichia coli            Patient is a 89y old  Female, from home, hard of hearing, lives alone,  with PMHx of arthritis, Depression, scoliosis, dementia,  now presents to the ER on 10/15/22 for evaluation of a fall. Patient states that she slipped and fell landing on her right side. Patient denies any head trauma or LOC but endorses right hip pain. ON admission, she found to have no fever, but tachycardia., Leukocytosis and positive urine analysis. The Xray of pelvis shows Dislocation femoral component of right hip prosthesis. She underwent Open reduction of Right hip on 10/20/22. The ID consult requested today, 10/21/22, to assist with antibiotic treatment for ESBL E.coli UTI.      REVIEW OF SYSTEMS: Total of twelve systems have been reviewed  and found to be negative unless mentioned in HPI      PAST MEDICAL & SURGICAL HISTORY:  Arthritis  No significant past surgical history      SOCIAL HISTORY  Alcohol: Does not drink  Tobacco: Does not smoke  Illicit substance use: None      FAMILY HISTORY: Non contributory to the present illness      ALLERGIES: No Known Allergies      Vital Signs Last 24 Hrs  T(C): 36.8 (21 Oct 2022 05:00), Max: 37 (20 Oct 2022 13:57)  T(F): 98.3 (21 Oct 2022 05:00), Max: 98.6 (20 Oct 2022 13:57)  HR: 82 (21 Oct 2022 10:47) (71 - 105)  BP: 151/72 (21 Oct 2022 05:00) (104/79 - 160/59)  BP(mean): 74 (20 Oct 2022 20:54) (74 - 91)  RR: 18 (21 Oct 2022 05:00) (18 - 23)  SpO2: 98% (21 Oct 2022 10:47) (92% - 100%)    Parameters below as of 21 Oct 2022 10:47  Patient On (Oxygen Delivery Method): nasal cannula  O2 Flow (L/min): 2        PHYSICAL EXAM:  GENERAL: Not in distress   CHEST/LUNG:  Not using accessory muscles   HEART: s1 and s2 present  ABDOMEN:  Nontender and  Nondistended  EXTREMITIES: No pedal  edema  CNS: Awake and Alert      LABS:                        10.5   10.46 )-----------( 219      ( 21 Oct 2022 05:25 )             34.5       10-21    142  |  104  |  17  ----------------------------<  73  4.1   |  34<H>  |  0.55    Ca    8.5      21 Oct 2022 05:25  Phos  3.5     10-21  Mg     1.9     10-21        CAPILLARY BLOOD GLUCOSE  POCT Blood Glucose.: 87 mg/dL (20 Oct 2022 23:56)        MEDICATIONS  (STANDING):  acetaminophen     Tablet .. 1000 milliGRAM(s) Oral every 8 hours  ALBUTerol    90 MICROgram(s) HFA Inhaler      albuterol/ipratropium for Nebulization 3 milliLiter(s) Nebulizer every 6 hours  amLODIPine   Tablet 2.5 milliGRAM(s) Oral daily  ceFAZolin   IVPB 1000 milliGRAM(s) IV Intermittent every 8 hours  dextrose 5% + sodium chloride 0.9%. 1000 milliLiter(s) (50 mL/Hr) IV Continuous <Continuous>  enoxaparin Injectable 30 milliGRAM(s) SubCutaneous every 12 hours  ertapenem  IVPB      ertapenem  IVPB 1000 milliGRAM(s) IV Intermittent every 24 hours  lidocaine   4% Patch 1 Patch Transdermal every 24 hours  multivitamin 1 Tablet(s) Oral daily  pantoprazole    Tablet 40 milliGRAM(s) Oral before breakfast  polyethylene glycol 3350 17 Gram(s) Oral at bedtime  senna 2 Tablet(s) Oral at bedtime    MEDICATIONS  (PRN):  magnesium hydroxide Suspension 30 milliLiter(s) Oral daily PRN Constipation  ondansetron Injectable 4 milliGRAM(s) IV Push every 6 hours PRN Nausea and/or Vomiting  oxyCODONE    IR 2.5 milliGRAM(s) Oral every 4 hours PRN Moderate Pain (4 - 6)  oxyCODONE    IR 5 milliGRAM(s) Oral every 6 hours PRN Severe Pain (7 - 10)      RADIOLOGY & ADDITIONAL TESTS:    10/15/22 : CT Cervical Spine No Cont (10.15.22 @ 11:40) >CT HEAD: Mild periventricular white matter ischemia.Tiny indeterminate   lacunar infarction in the anterior limb of the LEFT internal capsule. Mild to moderate cerebellar and temporal lobe atrophy.    CT cervical spine:   No vertebral fracture is recognized.  Straightening with grade 1 anterior spondylolisthesis at C2-3, C3-4 and C7-T1 on a   degenerative basis.    Advanced degenerative disc disease and spondylosis at C3-4 C7-T1 with narrowing of the BILATERAL C2-3 through C6-7 neural   foramina due to uncovertebral spurring and facet osteophytic hypertrophy.   Degenerative cord impingement is seen at C4-5 through C6-7 due to  posterior osteophytic ridge/disc complexes.    < from: CT Head No Cont (10.15.22 @ 11:39) >    CT HEAD: Mild periventricular white matter ischemia. Tiny indeterminate lacunar infarction in the anterior limb of the LEFT internal capsule.   Mild to moderate cerebellar and temporal lobe atrophy.      10/15/22: Xray Pelvis AP only (10.15.22 @ 11:33) >    Dislocation femoral component of right hip prosthesis.        MICROBIOLOGY DATA:    Culture - Urine (10.16.22 @ 01:53)   - Amikacin: S <=16   - Amikacin: S <=16   - Amoxicillin/Clavulanic Acid: S <=8/4   - Amoxicillin/Clavulanic Acid: S <=8/4 Consider reserving for cystitis when ampicillin/sulbactam is resistant   - Ampicillin: R >16 These ampicillin results predict results for amoxicillin   - Ampicillin: R >16 These ampicillin results predict results for amoxicillin   - Ampicillin/Sulbactam: S 8/4 Enterobacter, Klebsiella aerogenes, Citrobacter, and Serratia may develop resistance during prolonged therapy (3-4 days)   - Ampicillin/Sulbactam: R >16/8 Enterobacter, Klebsiella aerogenes, Citrobacter, and Serratia may develop resistance during prolonged therapy (3-4 days)   - Aztreonam: R >16   - Aztreonam: S <=4   - Cefazolin: S <=2 For uncomplicated UTI with K. pneumoniae, E. coli, or P. mirablis: BORIS <=16 is sensitive and BORIS >=32 is resistant. This also predicts results for oral agents cefaclor, cefdinir, cefpodoxime, cefprozil, cefuroxime axetil, cephalexin and locarbef for uncomplicated UTI. Note that some isolates may be susceptible to these agents while testing resistant to cefazolin.   - Cefazolin: R >16 For uncomplicated UTI with K. pneumoniae, E. coli, or P. mirablis: BORIS <=16 is sensitive and BORIS >=32 is resistant. This also predicts results for oral agents cefaclor, cefdinir, cefpodoxime, cefprozil, cefuroxime axetil, cephalexin and locarbef for uncomplicated UTI. Note that some isolates may be susceptible to these agents while testing resistant to cefazolin.   - Cefepime: R >16   - Cefepime: S <=2   - Cefoxitin: S <=8   - Ceftriaxone: S <=1 Enterobacter, Klebsiella aerogenes, Citrobacter, and Serratia may develop resistance during prolonged therapy   - Ceftriaxone: R >32 Enterobacter, Klebsiella aerogenes, Citrobacter, and Serratia may develop resistance during prolonged therapy   - Ciprofloxacin: R >2   - Ciprofloxacin: S <=0.25   - Ertapenem: S <=0.5   - Ertapenem: S <=0.5   - Gentamicin: R >8   - Gentamicin: R >8   - Imipenem: S <=1   - Imipenem: S <=1   - Levofloxacin: R >4   - Levofloxacin: S <=0.5   - Meropenem: S <=1   - Meropenem: S <=1   - Nitrofurantoin: S <=32 Should not be used to treat pyelonephritis   - Nitrofurantoin: S <=32 Should not be used to treat pyelonephritis   - Piperacillin/Tazobactam: S <=8   - Piperacillin/Tazobactam: S 16   - Tigecycline: S <=2   - Tigecycline: S <=2   - Tobramycin: I 8   - Tobramycin: I 8   - Trimethoprim/Sulfamethoxazole: S <=0.5/9.5   - Trimethoprim/Sulfamethoxazole: R >2/38   Specimen Source: Clean Catch Clean Catch (Midstream)   Culture Results:   >100,000 CFU/ml Escherichia coli   >100,000 CFU/ml Escherichia coli ESBL   Organism Identification: Escherichia coli ESBL   Escherichia coli   Organism: Escherichia coli ESBL   Organism: Escherichia coli

## 2022-10-21 NOTE — PHYSICAL THERAPY INITIAL EVALUATION ADULT - LEVEL OF INDEPENDENCE: SUPINE/SIT, REHAB EVAL
Infant was d/c from the hospital yesterday. Infant weight today was 2740g 6lb 0.6oz. Infant BW was 6lb 3.8oz      Mother assisted with latching techniques today. Infant has not been latching since birth. Infant was able to latch on today and transferred 10ml of breast milk. Infant effort was good was tired easily. Mothers milk supply is beginning to increase. She was able to pump 1oz of BM after the feeding. Instructions given to offer the breast when feeding cues are given. They are to supplement with 1 oz of milk either breast milk or formula after each breastfeeding. They have a follow up appointment for Friday. I also encouraged them that if babe is still hungry after the 1 oz supplement they can put him back to breast or offer another 1/2oz. Mother and father verbalized understanding. Mother was instructed to pump after each feeding.     moderate assist (50% patients effort)

## 2022-10-21 NOTE — PROGRESS NOTE ADULT - SUBJECTIVE AND OBJECTIVE BOX
Patient is a 89y old  Female who presents with a chief complaint of R hip dislocation (21 Oct 2022 09:34)  Awake, alert, laying in bed in NAD    INTERVAL HPI/OVERNIGHT EVENTS:      VITAL SIGNS:  T(F): 98.3 (10-21-22 @ 05:00)  HR: 97 (10-21-22 @ 05:00)  BP: 151/72 (10-21-22 @ 05:00)  RR: 18 (10-21-22 @ 05:00)  SpO2: 98% (10-21-22 @ 05:00)  Wt(kg): --  I&O's Detail    20 Oct 2022 07:01  -  21 Oct 2022 07:00  --------------------------------------------------------  IN:    Lactated Ringers Bolus: 1000 mL  Total IN: 1000 mL    OUT:    Indwelling Catheter - Urethral (mL): 1075 mL  Total OUT: 1075 mL    Total NET: -75 mL              REVIEW OF SYSTEMS:    CONSTITUTIONAL:  No fevers, chills, sweats    HEENT:  Eyes:  No diplopia or blurred vision. ENT:  No earache, sore throat or runny nose.    CARDIOVASCULAR:  No pressure, squeezing, tightness, or heaviness about the chest; no palpitations.    RESPIRATORY:  Per HPI    GASTROINTESTINAL:  No abdominal pain, nausea, vomiting or diarrhea.    GENITOURINARY:  No dysuria, frequency or urgency.    NEUROLOGIC:  No paresthesias, fasciculations, seizures or weakness.    PSYCHIATRIC:  No disorder of thought or mood.      PHYSICAL EXAM:    Constitutional: Well developed and nourished  Eyes:Perrla  ENMT: normal  Neck:supple  Respiratory: good air entry  Cardiovascular: S1 S2 regular  Gastrointestinal: Soft, Non tender  Extremities: No edema  Vascular:normal  Neurological:Awake, alert,Ox3  Musculoskeletal:Normal      MEDICATIONS  (STANDING):  acetaminophen     Tablet .. 1000 milliGRAM(s) Oral every 8 hours  ALBUTerol    90 MICROgram(s) HFA Inhaler      albuterol/ipratropium for Nebulization 3 milliLiter(s) Nebulizer every 6 hours  amLODIPine   Tablet 2.5 milliGRAM(s) Oral daily  ceFAZolin   IVPB 1000 milliGRAM(s) IV Intermittent every 8 hours  dextrose 5% + sodium chloride 0.9%. 1000 milliLiter(s) (50 mL/Hr) IV Continuous <Continuous>  enoxaparin Injectable 30 milliGRAM(s) SubCutaneous every 12 hours  ertapenem  IVPB      ertapenem  IVPB 1000 milliGRAM(s) IV Intermittent every 24 hours  lidocaine   4% Patch 1 Patch Transdermal every 24 hours  multivitamin 1 Tablet(s) Oral daily  pantoprazole    Tablet 40 milliGRAM(s) Oral before breakfast  polyethylene glycol 3350 17 Gram(s) Oral at bedtime  senna 2 Tablet(s) Oral at bedtime    MEDICATIONS  (PRN):  magnesium hydroxide Suspension 30 milliLiter(s) Oral daily PRN Constipation  morphine  - Injectable 1 milliGRAM(s) IV Push once PRN Severe Pain (7 - 10)  ondansetron Injectable 4 milliGRAM(s) IV Push every 6 hours PRN Nausea and/or Vomiting  oxyCODONE    IR 2.5 milliGRAM(s) Oral every 4 hours PRN Moderate Pain (4 - 6)  oxyCODONE    IR 5 milliGRAM(s) Oral every 6 hours PRN Severe Pain (7 - 10)      Allergies    No Known Allergies    Intolerances        LABS:                        10.5   10.46 )-----------( 219      ( 21 Oct 2022 05:25 )             34.5     10-21    142  |  104  |  17  ----------------------------<  73  4.1   |  34<H>  |  0.55    Ca    8.5      21 Oct 2022 05:25  Phos  3.5     10-21  Mg     1.9     10-21                CAPILLARY BLOOD GLUCOSE      POCT Blood Glucose.: 87 mg/dL (20 Oct 2022 23:56)        RADIOLOGY & ADDITIONAL TESTS:    CXR:    Ct scan chest:    ekg;    echo:e< from: Transthoracic Echocardiogram (10.17.22 @ 08:23) >  CONCLUSIONS:  1. Mitral annular calcification. Trace mitral  regurgitation.  2. Calcified trileaflet aortic valve with normal opening.  3. Aortic Root: 3.5 cm.  4. Normal left atrium.  LA volume index = 16 cc/m2.  5. Normal left ventricular internal dimensions and wall  thicknesses.  6. Normal Left Ventricular Systolic Function,  (EF = 55 to  60%)  7. Grade I diastolic dysfunction (Impaired relaxation,  mild).  8. Normal right atrium.  9. Normal right ventricular size and systolic function  (TAPSE 2.2 cm).  10. RV systolic pressure is moderately increased at  48 mm  Hg.  11. There is mild-moderate tricuspid regurgitation.  12. Pulmonic valve not well seen.  13. Normal pericardium with no pericardial effusion.    ------------------------------------------------------------------------

## 2022-10-21 NOTE — PROGRESS NOTE ADULT - PROBLEM SELECTOR PLAN 3
awaiting PT eval O2 as needed  continue albuterol and ipratropium O2 as needed  continue albuterol and ipratropium  continue CCB  outpatient follow up with pulmonology

## 2022-10-21 NOTE — PROGRESS NOTE ADULT - SUBJECTIVE AND OBJECTIVE BOX
Patient is a 89y old  Female who presents with a chief complaint of S/P Fall (20 Oct 2022 12:17)    pt seen in ed [  ], reg med floor [ x  ], bed [ x ], chair at bedside [   ], awake and responsive [ x ], lethargic [  ],    nad [  ]        Allergies    No Known Allergies        Vitals    T(F): 98.3 (10-21-22 @ 05:00), Max: 98.6 (10-20-22 @ 13:57)  HR: 97 (10-21-22 @ 05:00) (71 - 105)  BP: 151/72 (10-21-22 @ 05:00) (104/79 - 160/59)  RR: 18 (10-21-22 @ 05:00) (18 - 23)  SpO2: 98% (10-21-22 @ 05:00) (92% - 100%)  Wt(kg): --  CAPILLARY BLOOD GLUCOSE      POCT Blood Glucose.: 87 mg/dL (20 Oct 2022 23:56)      Labs                          10.5   10.46 )-----------( 219      ( 21 Oct 2022 05:25 )             34.5       10-20    141  |  104  |  18  ----------------------------<  78  3.9   |  33<H>  |  0.48<L>    Ca    8.7      20 Oct 2022 23:00  Phos  3.4     10-19  Mg     2.2     10-19              Clean Catch Clean Catch (Midstream)  10-16 @ 01:53   >100,000 CFU/ml Escherichia coli  >100,000 CFU/ml Escherichia coli ESBL  --  Escherichia coli ESBL  Escherichia coli          Radiology Results      Meds    MEDICATIONS  (STANDING):  acetaminophen     Tablet .. 1000 milliGRAM(s) Oral every 8 hours  ALBUTerol    90 MICROgram(s) HFA Inhaler      albuterol/ipratropium for Nebulization 3 milliLiter(s) Nebulizer every 6 hours  amLODIPine   Tablet 2.5 milliGRAM(s) Oral daily  ceFAZolin   IVPB 1000 milliGRAM(s) IV Intermittent every 8 hours  dextrose 5% + sodium chloride 0.9%. 1000 milliLiter(s) (50 mL/Hr) IV Continuous <Continuous>  enoxaparin Injectable 30 milliGRAM(s) SubCutaneous every 12 hours  ertapenem  IVPB      ertapenem  IVPB 1000 milliGRAM(s) IV Intermittent every 24 hours  lidocaine   4% Patch 1 Patch Transdermal every 24 hours  multivitamin 1 Tablet(s) Oral daily  pantoprazole    Tablet 40 milliGRAM(s) Oral before breakfast  polyethylene glycol 3350 17 Gram(s) Oral at bedtime  senna 2 Tablet(s) Oral at bedtime      MEDICATIONS  (PRN):  magnesium hydroxide Suspension 30 milliLiter(s) Oral daily PRN Constipation  morphine  - Injectable 1 milliGRAM(s) IV Push once PRN Severe Pain (7 - 10)  ondansetron Injectable 4 milliGRAM(s) IV Push every 6 hours PRN Nausea and/or Vomiting  oxyCODONE    IR 2.5 milliGRAM(s) Oral every 4 hours PRN Moderate Pain (4 - 6)  oxyCODONE    IR 5 milliGRAM(s) Oral every 6 hours PRN Severe Pain (7 - 10)      Physical Exam    Neuro :  no focal deficits  Respiratory: CTA B/L  CV: RRR, S1S2, no murmurs,   Abdominal: Soft, NT, ND +BS,  Extremities: No edema, + peripheral pulses, right lower extrem shortened and internally rotated    ASSESSMENT    right posterior dislocation of DOMONIQUE  s/p fall,   uti  h/o arthritis,   Depression,   scoliosis,   dementia         PLAN    ortho f/u   pt to have closed possible open reduction possible revision surgery of the R DOMONIQUE dislocation  -  D/w pts HCP-Christen Edgardo at [phone]- she does not know any information about the previous surgeons/surgeries- although consents to this surgery  -  D/w medical team who state patient is optimized for surgery  -  Pain control  -  DVT prophylaxis  -  NWB to RLE  -  Keep pt NPO past midnight   -  Plan for OR tomorrow- On schedule for 5PM  -  2 units PRBC on hold for OR  cont lidocaine patch, toradol prn,   pain mgmt eval  cardio f/u   echo with EF = 55 to 60%. Grade I diastolic dysfunction. RV systolic pressure is moderately increased noted    Pt at moderate risk for yared-operative cardiac complication for the proposed hip surgery.  ucx and sens with esbl e coli   d/c rocephin   start ertapenem   id cons  cont current meds               Patient is a 89y old  Female who presents with a chief complaint of S/P Fall (20 Oct 2022 12:17)    pt seen in ed [  ], reg surg floor [ x  ], bed [ x ], chair at bedside [   ], awake and responsive [ x ], lethargic [  ],    nad [  ]        Allergies    No Known Allergies        Vitals    T(F): 98.3 (10-21-22 @ 05:00), Max: 98.6 (10-20-22 @ 13:57)  HR: 97 (10-21-22 @ 05:00) (71 - 105)  BP: 151/72 (10-21-22 @ 05:00) (104/79 - 160/59)  RR: 18 (10-21-22 @ 05:00) (18 - 23)  SpO2: 98% (10-21-22 @ 05:00) (92% - 100%)  Wt(kg): --  CAPILLARY BLOOD GLUCOSE      POCT Blood Glucose.: 87 mg/dL (20 Oct 2022 23:56)      Labs                          10.5   10.46 )-----------( 219      ( 21 Oct 2022 05:25 )             34.5       10-20    141  |  104  |  18  ----------------------------<  78  3.9   |  33<H>  |  0.48<L>    Ca    8.7      20 Oct 2022 23:00  Phos  3.4     10-19  Mg     2.2     10-19              Clean Catch Clean Catch (Midstream)  10-16 @ 01:53   >100,000 CFU/ml Escherichia coli  >100,000 CFU/ml Escherichia coli ESBL  --  Escherichia coli ESBL  Escherichia coli          Radiology Results      Meds    MEDICATIONS  (STANDING):  acetaminophen     Tablet .. 1000 milliGRAM(s) Oral every 8 hours  ALBUTerol    90 MICROgram(s) HFA Inhaler      albuterol/ipratropium for Nebulization 3 milliLiter(s) Nebulizer every 6 hours  amLODIPine   Tablet 2.5 milliGRAM(s) Oral daily  ceFAZolin   IVPB 1000 milliGRAM(s) IV Intermittent every 8 hours  dextrose 5% + sodium chloride 0.9%. 1000 milliLiter(s) (50 mL/Hr) IV Continuous <Continuous>  enoxaparin Injectable 30 milliGRAM(s) SubCutaneous every 12 hours  ertapenem  IVPB      ertapenem  IVPB 1000 milliGRAM(s) IV Intermittent every 24 hours  lidocaine   4% Patch 1 Patch Transdermal every 24 hours  multivitamin 1 Tablet(s) Oral daily  pantoprazole    Tablet 40 milliGRAM(s) Oral before breakfast  polyethylene glycol 3350 17 Gram(s) Oral at bedtime  senna 2 Tablet(s) Oral at bedtime      MEDICATIONS  (PRN):  magnesium hydroxide Suspension 30 milliLiter(s) Oral daily PRN Constipation  morphine  - Injectable 1 milliGRAM(s) IV Push once PRN Severe Pain (7 - 10)  ondansetron Injectable 4 milliGRAM(s) IV Push every 6 hours PRN Nausea and/or Vomiting  oxyCODONE    IR 2.5 milliGRAM(s) Oral every 4 hours PRN Moderate Pain (4 - 6)  oxyCODONE    IR 5 milliGRAM(s) Oral every 6 hours PRN Severe Pain (7 - 10)      Physical Exam    Neuro :  no focal deficits  Respiratory: CTA B/L  CV: RRR, S1S2, no murmurs,   Abdominal: Soft, NT, ND +BS,  Extremities: No edema, + peripheral pulses, right hip dressing cdi         ASSESSMENT    right posterior dislocation of DOMONIQUE  s/p fall,   uti  h/o arthritis,   Depression,   scoliosis,   dementia         PLAN    Right hip open reduction of spontaneous dislocation, hip 20-Oct-2022    ortho f/u   hip precautions  gi and dvt prophylaxis  phys tx eval  cont lidocaine patch,   morphine prn   oxycodone prn   pain mgmt eval  cardio f/u   echo with EF = 55 to 60%. Grade I diastolic dysfunction. RV systolic pressure is moderately increased noted    pulm f/u   Oxygen supp. prn  Duoneb nebulizer solution   Amlodipine 2.5 mg PO  ucx and sens with esbl e coli   cont ertapenem   id cons  cont current meds

## 2022-10-21 NOTE — PROGRESS NOTE ADULT - PROBLEM SELECTOR PLAN 2
on ertapenem  ID Dr. Mullen ESBL E.coli bacteriuria  afebrile   leukocytosis resolved  on ertapenem  ID Dr. Mullen

## 2022-10-21 NOTE — PHYSICAL THERAPY INITIAL EVALUATION ADULT - GENERAL OBSERVATIONS, REHAB EVAL
Patient received in bed with I/V, Venodyne boot, O2 VIA N/C, Hip abductor pillow, Mcgill's catheter, Right hip bandage CDI

## 2022-10-21 NOTE — PROGRESS NOTE ADULT - ASSESSMENT
89 year old female, from home, hard of hearing, lives alone,  with PMHx of arthritis, Depression, scoliosis, dementia presents after a fall. Patient states that she slipped and fell landing on her right side with hip dislocation,UTI-ESBL.  1.PT.  2.Ortho f/u.  3.UTI-Abx.  4.Pain control.  5.GI and DVT prophylaxis.

## 2022-10-21 NOTE — PROGRESS NOTE ADULT - SUBJECTIVE AND OBJECTIVE BOX
Source of information: JUSTYNA LOMAX, Chart review  Patient language: English  : n/a    HPI:   89 year old female, from home, hard of hearing, lives alone,  with PMHx of arthritis, Depression, scoliosis, dementia presents after a fall. Patient states that she slipped and fell landing on her right side. Patient denies any head trauma or LOC but endorses right hip pain. Denies any headaches, numbness, tingling, fever, chills, nausea,  vomiting chest pain, SOB, abdominal pain, or change in bowel habits.     In the ED:  Afebrile, hemodynamically stable   CTH: tiny indeterminate lacunar infarcts, CT spine multiple degenerative changes  Xray hip: dislocation right hip   (15 Oct 2022 18:35)      This is a Patient is a 89y old  Female who presents with a chief complaint of S/P Fall (20 Oct 2022 12:17)  . Pt diagnosed with ... Pain consulted for ...Pt seen and examined at bedside. Pt reports PAIN SCORE:  *** SCALE USED: (1-10 VNRS). Pain adequately managed on current pain regimen. Pt describes pain as .... radiating to... alleviated by pain medications... exacerbated by movement.... Pt tolerating PO diet. Pt denies lethargy, nausea, vomiting, constipation and itchiness. Reports last BM ***. Patient stated goal for pain control: to be able to take deep breaths, get out of bed to chair and ambulate with tolerable pain control.     PAST MEDICAL & SURGICAL HISTORY:  Arthritis      No significant past surgical history          FAMILY HISTORY:      Social History:  Lives at home alone, no family, denies any alcohol or tobacco use (15 Oct 2022 18:35)   [ ]Denies ETOH use, illicit drug use, and smoking     Allergies    No Known Allergies    Intolerances        MEDICATIONS  (STANDING):  acetaminophen     Tablet .. 1000 milliGRAM(s) Oral every 8 hours  ALBUTerol    90 MICROgram(s) HFA Inhaler      albuterol/ipratropium for Nebulization 3 milliLiter(s) Nebulizer every 6 hours  amLODIPine   Tablet 2.5 milliGRAM(s) Oral daily  ceFAZolin   IVPB 1000 milliGRAM(s) IV Intermittent every 8 hours  dextrose 5% + sodium chloride 0.9%. 1000 milliLiter(s) (50 mL/Hr) IV Continuous <Continuous>  enoxaparin Injectable 30 milliGRAM(s) SubCutaneous every 12 hours  ertapenem  IVPB      ertapenem  IVPB 1000 milliGRAM(s) IV Intermittent every 24 hours  lidocaine   4% Patch 1 Patch Transdermal every 24 hours  multivitamin 1 Tablet(s) Oral daily  pantoprazole    Tablet 40 milliGRAM(s) Oral before breakfast  polyethylene glycol 3350 17 Gram(s) Oral at bedtime  senna 2 Tablet(s) Oral at bedtime    MEDICATIONS  (PRN):  magnesium hydroxide Suspension 30 milliLiter(s) Oral daily PRN Constipation  morphine  - Injectable 1 milliGRAM(s) IV Push once PRN Severe Pain (7 - 10)  ondansetron Injectable 4 milliGRAM(s) IV Push every 6 hours PRN Nausea and/or Vomiting  oxyCODONE    IR 2.5 milliGRAM(s) Oral every 4 hours PRN Moderate Pain (4 - 6)  oxyCODONE    IR 5 milliGRAM(s) Oral every 6 hours PRN Severe Pain (7 - 10)      Vital Signs Last 24 Hrs  T(C): 36.8 (21 Oct 2022 05:00), Max: 37 (20 Oct 2022 13:57)  T(F): 98.3 (21 Oct 2022 05:00), Max: 98.6 (20 Oct 2022 13:57)  HR: 97 (21 Oct 2022 05:00) (71 - 105)  BP: 151/72 (21 Oct 2022 05:00) (104/79 - 160/59)  BP(mean): 74 (20 Oct 2022 20:54) (74 - 91)  RR: 18 (21 Oct 2022 05:00) (18 - 23)  SpO2: 98% (21 Oct 2022 05:00) (92% - 100%)    Parameters below as of 21 Oct 2022 05:00  Patient On (Oxygen Delivery Method): nasal cannula  O2 Flow (L/min): 2    COVID-19 PCR: NotDetec (15 Oct 2022 12:51)    LABS: Reviewed                          10.5   10.46 )-----------( 219      ( 21 Oct 2022 05:25 )             34.5     10-21    142  |  104  |  17  ----------------------------<  73  4.1   |  34<H>  |  0.55    Ca    8.5      21 Oct 2022 05:25  Phos  3.5     10-21  Mg     1.9     10-21            CAPILLARY BLOOD GLUCOSE      POCT Blood Glucose.: 87 mg/dL (20 Oct 2022 23:56)    COVID-19 PCR: NotDetec (15 Oct 2022 12:51)      Radiology: Reviewed    ORT Score -   Family Hx of substance abuse	Female	      Male  Alcohol 	                                           1                     3  Illegal drugs	                                   2                     3  Rx drugs                                           4 	                  4  Personal Hx of substance abuse		  Alcohol 	                                          3	                  3  Illegal drugs                                     4	                  4  Rx drugs                                            5 	                  5  Age between 16- 45 years	           1                     1  hx preadolescent sexual abuse	   3 	                  0  Psychological disease		  ADD, OCD, bipolar, schizophrenia   2	          2  Depression                                           1 	          1  Total: 0    a score of 3 or lower indicates low risk for opioid abuse		  a score of 4-7 indicates moderate risk for opioid abuse		  a score of 8 or higher indicates high risk for opioid abuse    REVIEW OF SYSTEMS:  CONSTITUTIONAL: No fever or fatigue  HEENT:  No difficulty hearing, no change in vision  NECK: No pain or stiffness  RESPIRATORY: No cough, wheezing, chills or hemoptysis; No shortness of breath  CARDIOVASCULAR: No chest pain, palpitations, dizziness, or leg swelling  GASTROINTESTINAL: No loss of appetite, decreased PO intake. No abdominal or epigastric pain. No nausea, vomiting; No diarrhea or constipation.   GENITOURINARY: No dysuria, frequency, hematuria, retention or incontinence  MUSCULOSKELETAL: No joint pain or swelling; No muscle, back, or extremity pain, no upper or lower motor strength weakness, no saddle anesthesia, bowel/bladder incontinence, no falls   NEURO: No headaches, No numbness/tingling b/l LE, No weakness  ENDOCRINE: No polyuria, polydipsia, heat or cold intolerance; No hair loss  PSYCHIATRIC: No depression, anxiety or difficulty sleeping    PHYSICAL EXAM:  GENERAL:  Alert & Oriented X4, cooperative, NAD, Good concentration. Speech is clear.   RESPIRATORY: Respirations even and unlabored. Clear to auscultation bilaterally; No rales, rhonchi, wheezing, or rubs  CARDIOVASCULAR: Normal S1/S2, regular rate and rhythm; No murmurs, rubs, or gallops. No JVD.   GASTROINTESTINAL:  Soft, Nontender, Nondistended; Bowel sounds present  PERIPHERAL VASCULAR:  Extremities warm without edema. 2+ Peripheral Pulses, No cyanosis, No calf tenderness  MUSCULOSKELETAL: Motor Strength 5/5 B/L upper and lower extremities; moves all extremities equally against gravity; ROM intact; negative SLR; No tenderness on palpation of all joints.   SKIN: Warm, dry, intact. No rashes, lesions, scars or wounds.     Risk factors associated with adverse outcomes related to opioid treatment  [ ]  Concurrent benzodiazepine use  [ ]  History/ Active substance use or alcohol use disorder  [ ] Psychiatric co-morbidity  [ ] Sleep apnea  [ ] COPD  [ ] BMI> 35  [ ] Liver dysfunction  [ ] Renal dysfunction  [ ] CHF  [ ] Smoker  [ ]  Age > 60 years    [ ]  [place]  Reviewed and Copied to Chart. See below.    Plan of care and goal oriented pain management treatment options were discussed with patient and /or primary care giver; all questions and concerns were addressed and care was aligned with patient's wishes.    Educated patient on goal oriented pain management treatment options     10-21-22 @ 09:34     Source of information: JUSTYNA LOMAX, Chart review  Patient language: English  : n/a    HPI:   89 year old female, from home, hard of hearing, lives alone,  with PMHx of arthritis, Depression, scoliosis, dementia presents after a fall. Patient states that she slipped and fell landing on her right side. Patient denies any head trauma or LOC but endorses right hip pain. Denies any headaches, numbness, tingling, fever, chills, nausea,  vomiting chest pain, SOB, abdominal pain, or change in bowel habits.     In the ED:  Afebrile, hemodynamically stable   CTH: tiny indeterminate lacunar infarcts, CT spine multiple degenerative changes  Xray hip: dislocation right hip   (15 Oct 2022 18:35)    Pt is admitted for R hip pain s/p Fall. hx. of dementia, [place]. Pain consulted for acute right hip pain. XR Pelvis +R hip dislocation. Pt is s/p Open reduction of Right Total Hip dislocation on 10/20/2022. POD# 1. Pt seen and examined at bedside this morning. Patient found awake, alert and oriented x 2, reoriented to time. Patient with hx of dementia but able to answer questions appropriately. Patient OOB to chair, reports pain on R hip 6/10, not tolerable.  SCALE USED: (1-10 VNRS). Pt describes pain as aching, non-radiating exacerbated by movement. RN made aware to medicate patient. Pt tolerating po. Denies lethargy, nausea, vomiting, constipation, itchiness. Reports last BM today 10/20. Patient stated goal for pain control: to be able to take deep breaths, get out of bed to chair and ambulate with tolerable pain control.     PAST MEDICAL & SURGICAL HISTORY:  Arthritis    No significant past surgical history    FAMILY HISTORY:    Social History:  Lives at home alone, no family, denies any alcohol or tobacco use (15 Oct 2022 18:35)   [x]Denies ETOH use, illicit drug use, and smoking     Allergies    No Known Allergies    Intolerances    MEDICATIONS  (STANDING):  acetaminophen     Tablet .. 1000 milliGRAM(s) Oral every 8 hours  ALBUTerol    90 MICROgram(s) HFA Inhaler      albuterol/ipratropium for Nebulization 3 milliLiter(s) Nebulizer every 6 hours  amLODIPine   Tablet 2.5 milliGRAM(s) Oral daily  ceFAZolin   IVPB 1000 milliGRAM(s) IV Intermittent every 8 hours  dextrose 5% + sodium chloride 0.9%. 1000 milliLiter(s) (50 mL/Hr) IV Continuous <Continuous>  enoxaparin Injectable 30 milliGRAM(s) SubCutaneous every 12 hours  ertapenem  IVPB      ertapenem  IVPB 1000 milliGRAM(s) IV Intermittent every 24 hours  lidocaine   4% Patch 1 Patch Transdermal every 24 hours  multivitamin 1 Tablet(s) Oral daily  pantoprazole    Tablet 40 milliGRAM(s) Oral before breakfast  polyethylene glycol 3350 17 Gram(s) Oral at bedtime  senna 2 Tablet(s) Oral at bedtime    MEDICATIONS  (PRN):  magnesium hydroxide Suspension 30 milliLiter(s) Oral daily PRN Constipation  morphine  - Injectable 1 milliGRAM(s) IV Push once PRN Severe Pain (7 - 10)  ondansetron Injectable 4 milliGRAM(s) IV Push every 6 hours PRN Nausea and/or Vomiting  oxyCODONE    IR 2.5 milliGRAM(s) Oral every 4 hours PRN Moderate Pain (4 - 6)  oxyCODONE    IR 5 milliGRAM(s) Oral every 6 hours PRN Severe Pain (7 - 10)    Vital Signs Last 24 Hrs  T(C): 36.8 (21 Oct 2022 05:00), Max: 37 (20 Oct 2022 13:57)  T(F): 98.3 (21 Oct 2022 05:00), Max: 98.6 (20 Oct 2022 13:57)  HR: 97 (21 Oct 2022 05:00) (71 - 105)  BP: 151/72 (21 Oct 2022 05:00) (104/79 - 160/59)  BP(mean): 74 (20 Oct 2022 20:54) (74 - 91)  RR: 18 (21 Oct 2022 05:00) (18 - 23)  SpO2: 98% (21 Oct 2022 05:00) (92% - 100%)    Parameters below as of 21 Oct 2022 05:00  Patient On (Oxygen Delivery Method): nasal cannula  O2 Flow (L/min): 2    COVID-19 PCR: NotDetec (15 Oct 2022 12:51)    LABS: Reviewed                        10.5   10.46 )-----------( 219      ( 21 Oct 2022 05:25 )             34.5     10-21    142  |  104  |  17  ----------------------------<  73  4.1   |  34<H>  |  0.55    Ca    8.5      21 Oct 2022 05:25  Phos  3.5     10-21  Mg     1.9     10-21      CAPILLARY BLOOD GLUCOSE    POCT Blood Glucose.: 87 mg/dL (20 Oct 2022 23:56)    COVID-19 PCR: NotDetec (15 Oct 2022 12:51)    Radiology: Reviewed  ACC: [id number] EXAM:  XR PELVIS AP ONLY 1-2 VIEWS                          PROCEDURE DATE:  10/15/2022      INTERPRETATION:  History: Fall.    FINDINGS: Frontal pelvis.    COMPARISON: 5/27/2012.    Patient has history of right hip arthroplasty. Dislocation of the femoral   component. No periprosthetic fracture appreciated.    Degenerative change visualized lower lumbar spine.    Calcification overlying the pelvis may represent fibroid calcification.    IMPRESSION:    Dislocation femoral component of right hip prosthesis.    --- End of Report ---      LIDIA JUAREZ MD; Attending Radiologist  This document has been electronically signed. Oct 16 2022  1:57PM    ORT Score -   Family Hx of substance abuse	Female	      Male  Alcohol 	                                           1                     3  Illegal drugs	                                   2                     3  Rx drugs                                           4 	                  4  Personal Hx of substance abuse		  Alcohol 	                                          3	                  3  Illegal drugs                                     4	                  4  Rx drugs                                            5 	                  5  Age between 16- 45 years	           1                     1  hx preadolescent sexual abuse	   3 	                  0  Psychological disease		  ADD, OCD, bipolar, schizophrenia   2	          2  Depression                                           1 	          1  Total: 1    a score of 3 or lower indicates low risk for opioid abuse		  a score of 4-7 indicates moderate risk for opioid abuse		  a score of 8 or higher indicates high risk for opioid abuse    REVIEW OF SYSTEMS:  CONSTITUTIONAL: No fever or fatigue  HEENT:  No difficulty hearing, no change in vision  NECK: No pain or stiffness  RESPIRATORY: No cough, wheezing, chills or hemoptysis; No shortness of breath  CARDIOVASCULAR: No chest pain, palpitations, dizziness, or leg swelling  GASTROINTESTINAL: No loss of appetite, decreased PO intake. No abdominal or epigastric pain. No nausea, vomiting; No diarrhea or constipation.   GENITOURINARY: No dysuria, frequency, hematuria, retention or incontinence  MUSCULOSKELETAL: +  R hip pain. No muscle, back, or extremity pain, no upper or lower motor strength weakness, no saddle anesthesia, bowel/bladder incontinence, + falls   NEURO: No headaches, No numbness/tingling b/l LE, No weakness  ENDOCRINE: No polyuria, polydipsia, heat or cold intolerance; No hair loss  PSYCHIATRIC: + hx of depression, no anxiety or difficulty sleeping    PHYSICAL EXAM:  GENERAL:  Alert & Oriented X2 reoriented to time, able to answer questions, Speech is clear.   RESPIRATORY: Respirations even and unlabored. Clear to auscultation bilaterally; No rales, rhonchi, wheezing, or rubs. On 2 L NC  CARDIOVASCULAR: Normal S1/S2, regular rate and rhythm; No murmurs, rubs, or gallops. No JVD.   GASTROINTESTINAL:  Soft, Nontender, Nondistended; Bowel sounds present.  GENITOURINARY: Mcgill catheter in place, draining clear yellow urine.  PERIPHERAL VASCULAR:  Extremities warm without edema. 2+ Peripheral Pulses, No cyanosis, No calf tenderness  MUSCULOSKELETAL: Motor Strength 5/5 B/L upper and 3/5 lower extremities; moves all extremities equally against gravity; ROM decreased RLE; +tenderness on palpation of R hip.   SKIN: Warm, dry, intact. No rashes or lesions. +R hip dressing in place c/d/i, ecchymosis on b/l hands and legs.     Risk factors associated with adverse outcomes related to opioid treatment  [ ]  Concurrent benzodiazepine use  [ ]  History/ Active substance use or alcohol use disorder  [ x) Psychiatric co-morbidity  [ ] Sleep apnea  [ ] COPD  [ ] BMI> 35  [ ] Liver dysfunction  [ ] Renal dysfunction  [ ] CHF  [ ] Smoker  [x]  Age > 60 years    [x]  [place]  Reviewed and Copied to Chart. See below.    Plan of care and goal oriented pain management treatment options were discussed with patient and /or primary care giver; all questions and concerns were addressed and care was aligned with patient's wishes.    Educated patient on goal oriented pain management treatment options     10-21-22 @ 09:34

## 2022-10-21 NOTE — PROGRESS NOTE ADULT - ASSESSMENT
89yo F, PMH: depression, scoliosis, dementia and arthritis presented to the ED s/p fall and was admitted to [facility], underwent a reduction of the R hip, and was found to have ESBL E.coli bacteriuria, currently on ertapenem and to be seen by ID.

## 2022-10-21 NOTE — PHYSICAL THERAPY INITIAL EVALUATION ADULT - ACTIVE RANGE OF MOTION EXAMINATION, REHAB EVAL
Right Ankle-WFL/bilateral upper extremity Active ROM was WFL (within functional limits)/Left LE Active ROM was WFL (within functional limits)

## 2022-10-21 NOTE — PROGRESS NOTE ADULT - PROBLEM SELECTOR PLAN 1
Pt with acute Right hip pain which is somatic in nature due to s/p Open reduction of Right Total Hip dislocation on 10/20/2022. POD# 1. High risk medications reviewed. Avoid polypharmacy. Avoid IV opioids. Avoid NSAIDs and benzodiazepines. Non-pharmacological sleep aides initiated. Non-opioid medications and non-pharmacological pain management measures initiated.  Opioid pain recommendations   - Continue Oxycodone 2.5 mg/5 mg PO q 4 hours PRN moderate/severe pain post-op. Monitor for sedation/ respiratory depression.   Non-opioid pain recommendations   - Acetaminophen 1 gram PO q 8 hours for 4 days post-op. Monitor LFTs  - Continue Lidocaine patches 4%  Bowel Regimen  - Continue Miralax 17G PO daily  - Continue Senna 2 tablets at bedtime for constipation  Mild pain   - Non-pharmacological pain treatment recommendations  - Warm/ Cool packs PRN   - Repositioning extremity, elevation, imagery, relaxation, distraction.  - Physical therapy OOB if no contraindications   Recommendations discussed with primary team and RN.

## 2022-10-21 NOTE — PROGRESS NOTE ADULT - SUBJECTIVE AND OBJECTIVE BOX
89yFemale    Diagnosis:  S/p Open reduction of Right Total Hip dislocation POD# 1    Patient is seen and evaluated at bedside; offers no acute complaints.   Pain is mild; well controlled.  Denies CP/SOB, palpitations, dyspnea, paresthesias, N/V    ICU Vital Signs Last 24 Hrs  T(C): 36.8 (21 Oct 2022 05:00), Max: 37 (20 Oct 2022 13:57)  T(F): 98.3 (21 Oct 2022 05:00), Max: 98.6 (20 Oct 2022 13:57)  HR: 97 (21 Oct 2022 05:00) (71 - 105)  BP: 151/72 (21 Oct 2022 05:00) (104/79 - 160/59)  BP(mean): 74 (20 Oct 2022 20:54) (74 - 91)  ABP: --  ABP(mean): --  RR: 18 (21 Oct 2022 05:00) (18 - 23)  SpO2: 98% (21 Oct 2022 05:00) (92% - 100%)    O2 Parameters below as of 21 Oct 2022 05:00  Patient On (Oxygen Delivery Method): nasal cannula  O2 Flow (L/min): 2        Physical Exam:    General: in NAD, resting comfortably in bed.  RLE:  Right hip dressing c/d/i  abduction pillow in place   skin pink, warm  In nl alignment  Abduction pillow intact  no ct calves soft  nvi silt  2+ pulses DP/PT  compartments soft  5/5 strength of ehl/ta/gs b/l                          10.5   10.46 )-----------( 219      ( 21 Oct 2022 05:25 )             34.5     10-21    142  |  104  |  17  ----------------------------<  73  4.1   |  34<H>  |  0.55    Ca    8.5      21 Oct 2022 05:25  Phos  3.5     10-21  Mg     1.9     10-21        Impression:  89yFemale S/p Open reduction of Right Total Hip dislocation POD# 1  Plan:  -  Continue pain management  -  DVT prophylaxis with Lovenox and venodynes   -  Daily Physical Therapy:  WBAT on RLE with appropriate assistive device  - *Posterior hip precautions   -  Case d/w Dr. Geronimo

## 2022-10-22 LAB
ANION GAP SERPL CALC-SCNC: 3 MMOL/L — LOW (ref 5–17)
BASOPHILS # BLD AUTO: 0.05 K/UL — SIGNIFICANT CHANGE UP (ref 0–0.2)
BASOPHILS NFR BLD AUTO: 0.6 % — SIGNIFICANT CHANGE UP (ref 0–2)
BUN SERPL-MCNC: 16 MG/DL — SIGNIFICANT CHANGE UP (ref 7–18)
CALCIUM SERPL-MCNC: 8.3 MG/DL — LOW (ref 8.4–10.5)
CHLORIDE SERPL-SCNC: 106 MMOL/L — SIGNIFICANT CHANGE UP (ref 96–108)
CO2 SERPL-SCNC: 33 MMOL/L — HIGH (ref 22–31)
CREAT SERPL-MCNC: 0.44 MG/DL — LOW (ref 0.5–1.3)
EGFR: 89 ML/MIN/1.73M2 — SIGNIFICANT CHANGE UP
EOSINOPHIL # BLD AUTO: 0.43 K/UL — SIGNIFICANT CHANGE UP (ref 0–0.5)
EOSINOPHIL NFR BLD AUTO: 5.2 % — SIGNIFICANT CHANGE UP (ref 0–6)
GLUCOSE SERPL-MCNC: 104 MG/DL — HIGH (ref 70–99)
HCT VFR BLD CALC: 30.7 % — LOW (ref 34.5–45)
HGB BLD-MCNC: 9.6 G/DL — LOW (ref 11.5–15.5)
IMM GRANULOCYTES NFR BLD AUTO: 0.4 % — SIGNIFICANT CHANGE UP (ref 0–0.9)
LYMPHOCYTES # BLD AUTO: 1.56 K/UL — SIGNIFICANT CHANGE UP (ref 1–3.3)
LYMPHOCYTES # BLD AUTO: 18.8 % — SIGNIFICANT CHANGE UP (ref 13–44)
MAGNESIUM SERPL-MCNC: 2 MG/DL — SIGNIFICANT CHANGE UP (ref 1.6–2.6)
MCHC RBC-ENTMCNC: 31.3 GM/DL — LOW (ref 32–36)
MCHC RBC-ENTMCNC: 32.3 PG — SIGNIFICANT CHANGE UP (ref 27–34)
MCV RBC AUTO: 103.4 FL — HIGH (ref 80–100)
MONOCYTES # BLD AUTO: 0.78 K/UL — SIGNIFICANT CHANGE UP (ref 0–0.9)
MONOCYTES NFR BLD AUTO: 9.4 % — SIGNIFICANT CHANGE UP (ref 2–14)
NEUTROPHILS # BLD AUTO: 5.47 K/UL — SIGNIFICANT CHANGE UP (ref 1.8–7.4)
NEUTROPHILS NFR BLD AUTO: 65.6 % — SIGNIFICANT CHANGE UP (ref 43–77)
NRBC # BLD: 0 /100 WBCS — SIGNIFICANT CHANGE UP (ref 0–0)
PHOSPHATE SERPL-MCNC: 2.9 MG/DL — SIGNIFICANT CHANGE UP (ref 2.5–4.5)
PLATELET # BLD AUTO: 183 K/UL — SIGNIFICANT CHANGE UP (ref 150–400)
POTASSIUM SERPL-MCNC: 3.9 MMOL/L — SIGNIFICANT CHANGE UP (ref 3.5–5.3)
POTASSIUM SERPL-SCNC: 3.9 MMOL/L — SIGNIFICANT CHANGE UP (ref 3.5–5.3)
RBC # BLD: 2.97 M/UL — LOW (ref 3.8–5.2)
RBC # FLD: 12.4 % — SIGNIFICANT CHANGE UP (ref 10.3–14.5)
SODIUM SERPL-SCNC: 142 MMOL/L — SIGNIFICANT CHANGE UP (ref 135–145)
WBC # BLD: 8.32 K/UL — SIGNIFICANT CHANGE UP (ref 3.8–10.5)
WBC # FLD AUTO: 8.32 K/UL — SIGNIFICANT CHANGE UP (ref 3.8–10.5)

## 2022-10-22 RX ADMIN — ENOXAPARIN SODIUM 30 MILLIGRAM(S): 100 INJECTION SUBCUTANEOUS at 21:27

## 2022-10-22 RX ADMIN — SODIUM CHLORIDE 50 MILLILITER(S): 9 INJECTION INTRAMUSCULAR; INTRAVENOUS; SUBCUTANEOUS at 22:44

## 2022-10-22 RX ADMIN — ENOXAPARIN SODIUM 30 MILLIGRAM(S): 100 INJECTION SUBCUTANEOUS at 10:48

## 2022-10-22 RX ADMIN — Medication 1000 MILLIGRAM(S): at 22:00

## 2022-10-22 RX ADMIN — Medication 1000 MILLIGRAM(S): at 14:28

## 2022-10-22 RX ADMIN — Medication 1000 MILLIGRAM(S): at 13:28

## 2022-10-22 RX ADMIN — Medication 1000 MILLIGRAM(S): at 06:17

## 2022-10-22 RX ADMIN — PANTOPRAZOLE SODIUM 40 MILLIGRAM(S): 20 TABLET, DELAYED RELEASE ORAL at 06:17

## 2022-10-22 RX ADMIN — Medication 1000 MILLIGRAM(S): at 07:17

## 2022-10-22 RX ADMIN — LIDOCAINE 1 PATCH: 4 CREAM TOPICAL at 22:41

## 2022-10-22 RX ADMIN — LIDOCAINE 1 PATCH: 4 CREAM TOPICAL at 06:12

## 2022-10-22 RX ADMIN — Medication 1000 MILLIGRAM(S): at 21:28

## 2022-10-22 RX ADMIN — AMLODIPINE BESYLATE 2.5 MILLIGRAM(S): 2.5 TABLET ORAL at 06:17

## 2022-10-22 RX ADMIN — Medication 1 TABLET(S): at 13:28

## 2022-10-22 RX ADMIN — Medication 3 MILLILITER(S): at 20:18

## 2022-10-22 RX ADMIN — ERTAPENEM SODIUM 120 MILLIGRAM(S): 1 INJECTION, POWDER, LYOPHILIZED, FOR SOLUTION INTRAMUSCULAR; INTRAVENOUS at 10:47

## 2022-10-22 RX ADMIN — SENNA PLUS 2 TABLET(S): 8.6 TABLET ORAL at 21:28

## 2022-10-22 RX ADMIN — LIDOCAINE 1 PATCH: 4 CREAM TOPICAL at 17:56

## 2022-10-22 NOTE — DIETITIAN INITIAL EVALUATION ADULT - PERTINENT MEDS FT
MEDICATIONS  (STANDING):  acetaminophen     Tablet .. 1000 milliGRAM(s) Oral every 8 hours  ALBUTerol    90 MICROgram(s) HFA Inhaler      albuterol/ipratropium for Nebulization 3 milliLiter(s) Nebulizer every 6 hours  amLODIPine   Tablet 2.5 milliGRAM(s) Oral daily  enoxaparin Injectable 30 milliGRAM(s) SubCutaneous every 12 hours  ertapenem  IVPB      ertapenem  IVPB 1000 milliGRAM(s) IV Intermittent every 24 hours  lidocaine   4% Patch 1 Patch Transdermal every 24 hours  multivitamin 1 Tablet(s) Oral daily  pantoprazole    Tablet 40 milliGRAM(s) Oral before breakfast  polyethylene glycol 3350 17 Gram(s) Oral at bedtime  senna 2 Tablet(s) Oral at bedtime  sodium chloride 0.9%. 1000 milliLiter(s) (50 mL/Hr) IV Continuous <Continuous>    MEDICATIONS  (PRN):  magnesium hydroxide Suspension 30 milliLiter(s) Oral daily PRN Constipation  ondansetron Injectable 4 milliGRAM(s) IV Push every 6 hours PRN Nausea and/or Vomiting  oxyCODONE    IR 2.5 milliGRAM(s) Oral every 4 hours PRN Moderate Pain (4 - 6)  oxyCODONE    IR 5 milliGRAM(s) Oral every 6 hours PRN Severe Pain (7 - 10)

## 2022-10-22 NOTE — DIETITIAN INITIAL EVALUATION ADULT - SIGNS/SYMPTOMS
Poor oral intake w/22% wt.loss, difficulty chewing, severe temporal, BMI19, debilitated & wound care

## 2022-10-22 NOTE — PROGRESS NOTE ADULT - SUBJECTIVE AND OBJECTIVE BOX
CHIEF COMPLAINT:Patient is a 89y old  Female who presents with a chief complaint of Dislocated right hip.Pt appears comfortable.    	  REVIEW OF SYSTEMS:  CONSTITUTIONAL: No fever, weight loss, or fatigue  EYES: No eye pain, visual disturbances, or discharge  ENT:  No difficulty hearing, tinnitus, vertigo; No sinus or throat pain  NECK: No pain or stiffness  RESPIRATORY: No cough, wheezing, chills or hemoptysis; No Shortness of Breath  CARDIOVASCULAR: No chest pain, palpitations, passing out, dizziness, or leg swelling  GASTROINTESTINAL: No abdominal or epigastric pain. No nausea, vomiting, or hematemesis; No diarrhea or constipation. No melena or hematochezia.  GENITOURINARY: No dysuria, frequency, hematuria, or incontinence  NEUROLOGICAL: No headaches, memory loss, loss of strength, numbness, or tremors  SKIN: No itching, burning, rashes, or lesions   LYMPH Nodes: No enlarged glands  ENDOCRINE: No heat or cold intolerance; No hair loss  MUSCULOSKELETAL: No joint pain or swelling; No muscle, back, or extremity pain  PSYCHIATRIC: No depression, anxiety, mood swings, or difficulty sleeping  HEME/LYMPH: No easy bruising, or bleeding gums  ALLERGY AND IMMUNOLOGIC: No hives or eczema	      PHYSICAL EXAM:  T(C): 36.7 (10-22-22 @ 10:10), Max: 37.1 (10-21-22 @ 20:37)  HR: 92 (10-22-22 @ 10:10) (82 - 110)  BP: 129/68 (10-22-22 @ 10:10) (111/53 - 133/59)  RR: 18 (10-22-22 @ 10:10) (18 - 18)  SpO2: 97% (10-22-22 @ 10:10) (95% - 100%)  Wt(kg): --  I&O's Summary    21 Oct 2022 07:01  -  22 Oct 2022 07:00  --------------------------------------------------------  IN: 500 mL / OUT: 650 mL / NET: -150 mL        Appearance: Normal	  HEENT:   Normal oral mucosa, PERRL, EOMI	  Lymphatic: No lymphadenopathy  Cardiovascular: Normal S1 S2, No JVD, No murmurs, No edema  Respiratory: Lungs clear to auscultation	  Psychiatry: A & O x 3, Mood & affect appropriate  Gastrointestinal:  Soft, Non-tender, + BS	  Skin: No rashes, No ecchymoses, No cyanosis	  Neurologic: Non-focal  Extremities: Normal range of motion, No clubbing, cyanosis or edema  Vascular: Peripheral pulses palpable 2+ bilaterally    MEDICATIONS  (STANDING):  acetaminophen     Tablet .. 1000 milliGRAM(s) Oral every 8 hours  ALBUTerol    90 MICROgram(s) HFA Inhaler      albuterol/ipratropium for Nebulization 3 milliLiter(s) Nebulizer every 6 hours  amLODIPine   Tablet 2.5 milliGRAM(s) Oral daily  enoxaparin Injectable 30 milliGRAM(s) SubCutaneous every 12 hours  ertapenem  IVPB      ertapenem  IVPB 1000 milliGRAM(s) IV Intermittent every 24 hours  lidocaine   4% Patch 1 Patch Transdermal every 24 hours  multivitamin 1 Tablet(s) Oral daily  pantoprazole    Tablet 40 milliGRAM(s) Oral before breakfast  polyethylene glycol 3350 17 Gram(s) Oral at bedtime  senna 2 Tablet(s) Oral at bedtime  sodium chloride 0.9%. 1000 milliLiter(s) (50 mL/Hr) IV Continuous <Continuous>      LABS:	 	                       9.6    8.32  )-----------( 183      ( 22 Oct 2022 05:44 )             30.7     10-22    142  |  106  |  16  ----------------------------<  104<H>  3.9   |  33<H>  |  0.44<L>    Ca    8.3<L>      22 Oct 2022 05:44  Phos  2.9     10-22  Mg     2.0     10-22        Lipid Profile: Cholesterol 144  LDL --  HDL 50  TG 69  Ldl calc 80  Ratio --    HgA1c:   TSH: Thyroid Stimulating Hormone, Serum: 2.66 uU/mL (10-16 @ 05:30)

## 2022-10-22 NOTE — DIETITIAN INITIAL EVALUATION ADULT - ORAL INTAKE PTA/DIET HISTORY
Per pt. consumes ~ 2 small soft meals with "chopped up meats" & drinks at least 2 bottles of Ensure supplements daily

## 2022-10-22 NOTE — PROGRESS NOTE ADULT - SUBJECTIVE AND OBJECTIVE BOX
Patient is a 89y old  Female who presents with a chief complaint of S/P Fall (21 Oct 2022 11:31)  Awake, alert, comfortable in bed in NAD    INTERVAL HPI/OVERNIGHT EVENTS:      VITAL SIGNS:  T(F): 98 (10-22-22 @ 10:10)  HR: 92 (10-22-22 @ 10:10)  BP: 129/68 (10-22-22 @ 10:10)  RR: 18 (10-22-22 @ 10:10)  SpO2: 97% (10-22-22 @ 10:10)  Wt(kg): --  I&O's Detail    21 Oct 2022 07:01  -  22 Oct 2022 07:00  --------------------------------------------------------  IN:    Lactated Ringers Bolus: 500 mL  Total IN: 500 mL    OUT:    Indwelling Catheter - Urethral (mL): 650 mL  Total OUT: 650 mL    Total NET: -150 mL              REVIEW OF SYSTEMS:    CONSTITUTIONAL:  No fevers, chills, sweats    HEENT:  Eyes:  No diplopia or blurred vision. ENT:  No earache, sore throat or runny nose.    CARDIOVASCULAR:  No pressure, squeezing, tightness, or heaviness about the chest; no palpitations.    RESPIRATORY:  Per HPI    GASTROINTESTINAL:  No abdominal pain, nausea, vomiting or diarrhea.    GENITOURINARY:  No dysuria, frequency or urgency.    NEUROLOGIC:  No paresthesias, fasciculations, seizures or weakness.    PSYCHIATRIC:  No disorder of thought or mood.      PHYSICAL EXAM:    Constitutional: Well developed and nourished  Eyes:[place]  ENMT: normal  Neck:supple  Respiratory: good air entry  Cardiovascular: S1 S2 regular  Gastrointestinal: Soft, Non tender  Extremities: No edema  Vascular:normal  Neurological:Awake, alert,Ox3  Musculoskeletal:Normal      MEDICATIONS  (STANDING):  acetaminophen     Tablet .. 1000 milliGRAM(s) Oral every 8 hours  ALBUTerol    90 MICROgram(s) HFA Inhaler      albuterol/ipratropium for Nebulization 3 milliLiter(s) Nebulizer every 6 hours  amLODIPine   Tablet 2.5 milliGRAM(s) Oral daily  enoxaparin Injectable 30 milliGRAM(s) SubCutaneous every 12 hours  ertapenem  IVPB      ertapenem  IVPB 1000 milliGRAM(s) IV Intermittent every 24 hours  lidocaine   4% Patch 1 Patch Transdermal every 24 hours  multivitamin 1 Tablet(s) Oral daily  pantoprazole    Tablet 40 milliGRAM(s) Oral before breakfast  polyethylene glycol 3350 17 Gram(s) Oral at bedtime  senna 2 Tablet(s) Oral at bedtime  sodium chloride 0.9%. 1000 milliLiter(s) (50 mL/Hr) IV Continuous <Continuous>    MEDICATIONS  (PRN):  magnesium hydroxide Suspension 30 milliLiter(s) Oral daily PRN Constipation  ondansetron Injectable 4 milliGRAM(s) IV Push every 6 hours PRN Nausea and/or Vomiting  oxyCODONE    IR 2.5 milliGRAM(s) Oral every 4 hours PRN Moderate Pain (4 - 6)  oxyCODONE    IR 5 milliGRAM(s) Oral every 6 hours PRN Severe Pain (7 - 10)      Allergies    No Known Allergies    Intolerances        LABS:                        9.6    8.32  )-----------( 183      ( 22 Oct 2022 05:44 )             30.7     10-22    142  |  106  |  16  ----------------------------<  104<H>  3.9   |  33<H>  |  0.44<L>    Ca    8.3<L>      22 Oct 2022 05:44  Phos  2.9     10-22  Mg     2.0     10-22                CAPILLARY BLOOD GLUCOSE            RADIOLOGY & ADDITIONAL TESTS:    CXR:    Ct scan chest:    ekg;    echo:

## 2022-10-22 NOTE — DIETITIAN INITIAL EVALUATION ADULT - FACTORS AFF FOOD INTAKE
s/p fall with right hip dislocation, weakness, arthritis, Depression, scoliosis, UTI, discharge planning issue/difficulty chewing/difficulty with food procurement/preparation/persistent lack of appetite/[place]/ethnic/cultural/personal food preferences/other (specify)

## 2022-10-22 NOTE — DIETITIAN INITIAL EVALUATION ADULT - NS FNS DIET ORDER
Diet, Regular:   Supplement Feeding Modality:  Oral  Ensure Enlive Cans or Servings Per Day:  2       Frequency:  Daily (10-21-22 @ 15:17)

## 2022-10-22 NOTE — DIETITIAN NUTRITION RISK NOTIFICATION - TREATMENT: THE FOLLOWING DIET HAS BEEN RECOMMENDED
Diet, Regular:   Supplement Feeding Modality:  Oral  Ensure Enlive Cans or Servings Per Day:  2       Frequency:  Daily (10-21-22 @ 15:17) [Active]

## 2022-10-22 NOTE — PROGRESS NOTE ADULT - SUBJECTIVE AND OBJECTIVE BOX
Patient is seen and examined at the bed side, is afebrile. She mentioned feeling better. The billingsley catheter draining brownish color urine.       REVIEW OF SYSTEMS: All other review systems are negative      ALLERGIES: No Known Allergies      Vital Signs Last 24 Hrs  T(C): 36.9 (22 Oct 2022 14:50), Max: 36.9 (22 Oct 2022 14:50)  T(F): 98.5 (22 Oct 2022 14:50), Max: 98.5 (22 Oct 2022 14:50)  HR: 89 (22 Oct 2022 14:50) (82 - 101)  BP: 124/66 (22 Oct 2022 14:50) (111/53 - 129/68)  BP(mean): --  RR: 18 (22 Oct 2022 14:50) (18 - 18)  SpO2: 97% (22 Oct 2022 14:50) (95% - 99%)    Parameters below as of 22 Oct 2022 14:50  Patient On (Oxygen Delivery Method): nasal cannula  O2 Flow (L/min): 2        PHYSICAL EXAM:  GENERAL: Not in distress   CHEST/LUNG:  Not using accessory muscles   HEART: s1 and s2 present  ABDOMEN:  Nontender and  Nondistended  EXTREMITIES: No pedal  edema  CNS: Awake and Alert      LABS:                        9.6    8.32  )-----------( 183      ( 22 Oct 2022 05:44 )             30.7                           10.5   10.46 )-----------( 219      ( 21 Oct 2022 05:25 )             34.5       10-22    142  |  106  |  16  ----------------------------<  104<H>  3.9   |  33<H>  |  0.44<L>    Ca    8.3<L>      22 Oct 2022 05:44  Phos  2.9     10-22  Mg     2.0     10-22      10-21    142  |  104  |  17  ----------------------------<  73  4.1   |  34<H>  |  0.55    Ca    8.5      21 Oct 2022 05:25  Phos  3.5     10-21  Mg     1.9     10-21        CAPILLARY BLOOD GLUCOSE  POCT Blood Glucose.: 87 mg/dL (20 Oct 2022 23:56)        MEDICATIONS  (STANDING):    acetaminophen     Tablet .. 1000 milliGRAM(s) Oral every 8 hours  ALBUTerol    90 MICROgram(s) HFA Inhaler      albuterol/ipratropium for Nebulization 3 milliLiter(s) Nebulizer every 6 hours  amLODIPine   Tablet 2.5 milliGRAM(s) Oral daily  enoxaparin Injectable 30 milliGRAM(s) SubCutaneous every 12 hours  ertapenem  IVPB      ertapenem  IVPB 1000 milliGRAM(s) IV Intermittent every 24 hours  lidocaine   4% Patch 1 Patch Transdermal every 24 hours  multivitamin 1 Tablet(s) Oral daily  pantoprazole    Tablet 40 milliGRAM(s) Oral before breakfast  polyethylene glycol 3350 17 Gram(s) Oral at bedtime  senna 2 Tablet(s) Oral at bedtime  sodium chloride 0.9%. 1000 milliLiter(s) (50 mL/Hr) IV Continuous <Continuous>        RADIOLOGY & ADDITIONAL TESTS:    10/15/22 : CT Cervical Spine No Cont (10.15.22 @ 11:40) >CT HEAD: Mild periventricular white matter ischemia.Tiny indeterminate   lacunar infarction in the anterior limb of the LEFT internal capsule. Mild to moderate cerebellar and temporal lobe atrophy.    CT cervical spine:   No vertebral fracture is recognized.  Straightening with grade 1 anterior spondylolisthesis at C2-3, C3-4 and C7-T1 on a   degenerative basis.    Advanced degenerative disc disease and spondylosis at C3-4 C7-T1 with narrowing of the BILATERAL C2-3 through C6-7 neural   foramina due to uncovertebral spurring and facet osteophytic hypertrophy.   Degenerative cord impingement is seen at C4-5 through C6-7 due to  posterior osteophytic ridge/disc complexes.    10/15/22: CT Head No Cont (10.15.22 @ 11:39) >    CT HEAD: Mild periventricular white matter ischemia. Tiny indeterminate lacunar infarction in the anterior limb of the LEFT internal capsule.   Mild to moderate cerebellar and temporal lobe atrophy.      10/15/22: Xray Pelvis AP only (10.15.22 @ 11:33) >    Dislocation femoral component of right hip prosthesis.        MICROBIOLOGY DATA:    Culture - Urine (10.16.22 @ 01:53)   - Amikacin: S <=16   - Amikacin: S <=16   - Amoxicillin/Clavulanic Acid: S <=8/4   - Amoxicillin/Clavulanic Acid: S <=8/4 Consider reserving for cystitis when ampicillin/sulbactam is resistant   - Ampicillin: R >16 These ampicillin results predict results for amoxicillin   - Ampicillin: R >16 These ampicillin results predict results for amoxicillin   - Ampicillin/Sulbactam: S 8/4 Enterobacter, Klebsiella aerogenes, Citrobacter, and Serratia may develop resistance during prolonged therapy (3-4 days)   - Ampicillin/Sulbactam: R >16/8 Enterobacter, Klebsiella aerogenes, Citrobacter, and Serratia may develop resistance during prolonged therapy (3-4 days)   - Aztreonam: R >16   - Aztreonam: S <=4   - Cefazolin: S <=2 For uncomplicated UTI with K. pneumoniae, E. coli, or P. mirablis: BORIS <=16 is sensitive and BORIS >=32 is resistant. This also predicts results for oral agents cefaclor, cefdinir, cefpodoxime, cefprozil, cefuroxime axetil, cephalexin and locarbef for uncomplicated UTI. Note that some isolates may be susceptible to these agents while testing resistant to cefazolin.   - Cefazolin: R >16 For uncomplicated UTI with K. pneumoniae, E. coli, or P. mirablis: BORIS <=16 is sensitive and BORIS >=32 is resistant. This also predicts results for oral agents cefaclor, cefdinir, cefpodoxime, cefprozil, cefuroxime axetil, cephalexin and locarbef for uncomplicated UTI. Note that some isolates may be susceptible to these agents while testing resistant to cefazolin.   - Cefepime: R >16   - Cefepime: S <=2   - Cefoxitin: S <=8   - Ceftriaxone: S <=1 Enterobacter, Klebsiella aerogenes, Citrobacter, and Serratia may develop resistance during prolonged therapy   - Ceftriaxone: R >32 Enterobacter, Klebsiella aerogenes, Citrobacter, and Serratia may develop resistance during prolonged therapy   - Ciprofloxacin: R >2   - Ciprofloxacin: S <=0.25   - Ertapenem: S <=0.5   - Ertapenem: S <=0.5   - Gentamicin: R >8   - Gentamicin: R >8   - Imipenem: S <=1   - Imipenem: S <=1   - Levofloxacin: R >4   - Levofloxacin: S <=0.5   - Meropenem: S <=1   - Meropenem: S <=1   - Nitrofurantoin: S <=32 Should not be used to treat pyelonephritis   - Nitrofurantoin: S <=32 Should not be used to treat pyelonephritis   - Piperacillin/Tazobactam: S <=8   - Piperacillin/Tazobactam: S 16   - Tigecycline: S <=2   - Tigecycline: S <=2   - Tobramycin: I 8   - Tobramycin: I 8   - Trimethoprim/Sulfamethoxazole: S <=0.5/9.5   - Trimethoprim/Sulfamethoxazole: R >2/38   Specimen Source: Clean Catch Clean Catch (Midstream)   Culture Results:   >100,000 CFU/ml Escherichia coli   >100,000 CFU/ml Escherichia coli ESBL   Organism Identification: Escherichia coli ESBL   Escherichia coli   Organism: Escherichia coli ESBL   Organism: Escherichia coli

## 2022-10-22 NOTE — PROGRESS NOTE ADULT - SUBJECTIVE AND OBJECTIVE BOX
89yFemale    Diagnosis:  S/p Open reduction of Right Total Hip dislocation POD# 2    Patient is seen and evaluated at bedside; offers no acute complaints.   Pain is mild; well controlled.  Denies CP/SOB, palpitations, dyspnea, paresthesias, N/V    ICU Vital Signs Last 24 Hrs  T(C): 36.7 (22 Oct 2022 10:10), Max: 37.1 (21 Oct 2022 20:37)  T(F): 98 (22 Oct 2022 10:10), Max: 98.8 (21 Oct 2022 20:37)  HR: 92 (22 Oct 2022 10:10) (82 - 110)  BP: 129/68 (22 Oct 2022 10:10) (111/53 - 133/59)  BP(mean): --  ABP: --  ABP(mean): --  RR: 18 (22 Oct 2022 10:10) (18 - 18)  SpO2: 97% (22 Oct 2022 10:10) (95% - 100%)    O2 Parameters below as of 22 Oct 2022 10:10  Patient On (Oxygen Delivery Method): nasal cannula  O2 Flow (L/min): 2          Physical Exam:    General: in NAD, resting comfortably in bed.  RLE:  Right hip dressing c/d/i  staples intact. Wound edges well approximated   abduction pillow in place   skin pink, warm  In nl alignment  Abduction pillow intact  no ct calves soft  nvi silt  2+ pulses DP/PT  compartments soft  5/5 strength of ehl/ta/gs b/l                          9.6    8.32  )-----------( 183      ( 22 Oct 2022 05:44 )             30.7   10-22    142  |  106  |  16  ----------------------------<  104<H>  3.9   |  33<H>  |  0.44<L>    Ca    8.3<L>      22 Oct 2022 05:44  Phos  2.9     10-22  Mg     2.0     10-22          Impression:  89yFemale S/p Open reduction of Right Total Hip dislocation POD# 2  Plan:  -  Continue pain management  -  DVT prophylaxis with Lovenox and venodynes   -  Daily Physical Therapy:  WBAT on RLE with appropriate assistive device  - *Posterior hip precautions   -  Discharge planning MARTHA  -  Dressing changed today  -  Case d/w Dr. Geronimo

## 2022-10-22 NOTE — DIETITIAN INITIAL EVALUATION ADULT - PERTINENT LABORATORY DATA
10-22    142  |  106  |  16  ----------------------------<  104<H>  3.9   |  33<H>  |  0.44<L>    Ca    8.3<L>      22 Oct 2022 05:44  Phos  2.9     10-22  Mg     2.0     10-22    A1C with Estimated Average Glucose Result: 5.7 % (10-16-22 @ 05:30)

## 2022-10-22 NOTE — PROGRESS NOTE ADULT - ASSESSMENT
Patient is a 89y old  Female, from home, hard of hearing, lives alone,  with PMHx of arthritis, Depression, scoliosis, dementia,  now presents to the ER on 10/15/22 for evaluation of a fall. Patient states that she slipped and fell landing on her right side. Patient denies any head trauma or LOC but endorses right hip pain. ON admission, she found to have no fever, but tachycardia., Leukocytosis and positive urine analysis. The Xray of pelvis shows Dislocation femoral component of right hip prosthesis. She underwent Open reduction of Right hip on 10/20/22. The ID consult requested today, 10/21/22, to assist with antibiotic treatment for ESBL E.coli UTI.    # Sepsis ( Tachycardia + Leukocytosis) on admission- resolved  # UTI - 10/16 and Urine Cx grew ESBL  E.coli  # S/p Fall-  Xray of pelvis shows Dislocation femoral component of right hip prosthesis.- s/p Open reduction on 10/20/22    would recommend:    1. Wound care as per Ortho  2. Continue Ertapenem to cover ESBL  E.coli in urine  3. Monitor kidney function and IVF  4. Pain management as needed    d/w patient and Nursing staff    Attending Attestation:    Spent more than 45 minutes on total encounter, more than 50 % of the visit was spent counseling and/or coordinating care by the Attending physician.

## 2022-10-22 NOTE — CHART NOTE - NSCHARTNOTEFT_GEN_A_CORE
EVENT: Primary RN called NP to report rapid, variable -130 bpm and dark emma concentrated urine in billingsley    SUBJECTIVE: Upon bedside evaluation by me patient denies palpitations, sob, chest pain, or dizziness    HPI: 89 year old female, from home, hard of hearing, lives alone,  with PMHx of arthritis, Depression, scoliosis, dementia presents after a fall. Patient states that she slipped and fell landing on her right side. Patient denies any head trauma or LOC but endorses right hip pain. Denies any headaches, numbness, tingling, fever, chills, nausea,  vomiting chest pain, SOB, abdominal pain, or change in bowel habits.     OBJECTIVE:  Vital Signs Last 24 Hrs  T(C): 36.7 (22 Oct 2022 00:08), Max: 37.1 (21 Oct 2022 20:37)  T(F): 98.1 (22 Oct 2022 00:08), Max: 98.8 (21 Oct 2022 20:37)  HR: 101 (22 Oct 2022 00:11) (82 - 110)  BP: 111/53 (22 Oct 2022 00:08) (111/53 - 151/72)  RR: 18 (22 Oct 2022 00:08) (18 - 18)  SpO2: 95% (22 Oct 2022 00:08) (95% - 100%)    Parameters below as of 22 Oct 2022 00:08  Patient On (Oxygen Delivery Method): nasal cannula  O2 Flow (L/min): 2      PHYSICAL EXAM:  Neuro: Awake and alert, oriented to person, place, and time  Cardiovascular: + S1, S2, RRR, no murmurs, rubs, or bruits,   Respiratory: clear to auscultation bilaterally with good air entry   GI: Abdomen soft, non-tender, bowel sounds present   : Non distended;   Skin: warm and dry; no rash      LABS:                        10.5   10.46 )-----------( 219      ( 21 Oct 2022 05:25 )             34.5     10-21    142  |  104  |  17  ----------------------------<  73  4.1   |  34<H>  |  0.55    Ca    8.5      21 Oct 2022 05:25  Phos  3.5     10-21  Mg     1.9     10-21      EKG: Sinus tach 116 bpm      ASSESSMENT/PROBLEM: Dehydration related to poor PO intake and NPO preoperative status.       PLAN: Hemodynamically stable at this time. Normal saline 500 ml x 1 dose over 30 minutes (last EF on echo 55%)             Maintenance IVF: NS 50 ml/hr    FOLLOW UP / RESULT: Monitor urine output                                   Monitor HR/BP

## 2022-10-22 NOTE — DIETITIAN INITIAL EVALUATION ADULT - OTHER INFO
Patient form home lives alone s/p fall admitted for right hip dislocation. Visited pt. alert, weak & [place], states poor po intake, "has no appetite or desire to eat" >1 month, also with difficulty "chewing" since with no lower dentures, stating "it was lost at last admission?" therefore unable to replace. Pt. amenable to "soft & bite size" consistency, provided food preferences & updated kitchen. Pt. admits to weight loss, states her  Lbs >1month ago & current wt.110 Lbs?, dosing wt. 105 , Patient form home lives alone s/p fall admitted for right hip dislocation. Visited pt. alert, weak & [place], states poor po intake, "has no appetite or desire to eat" >1 month, also with difficulty "chewing" since with no lower dentures, stating "it was lost at last admission?" therefore unable to replace. Pt. amenable to "soft & bite size" consistency, provided food preferences & updated kitchen, observed lunch tray intake ~35%, encouraged po w/meal set up. Pt. admits to weight loss, states her  Lbs >1month ago & current wt.110 Lbs?, dosing wt. 104.9 Lbs on 10/15/22, also was able to conduct nutrition focus physical exam, see below fields. S/p Open reduction of right Total Hip dislocation on 10/20/22, Ortho team following, ongoing wound care with on pain management & awaiting placement.

## 2022-10-22 NOTE — DIETITIAN INITIAL EVALUATION ADULT - PHYSCIAL ASSESSMENT
Observed right & left calf with WBAT on RLE & appropriate assistive device emaciated/debilitated/other (specify)

## 2022-10-22 NOTE — PROGRESS NOTE ADULT - SUBJECTIVE AND OBJECTIVE BOX
Patient is a 89y old  Female who presents with a chief complaint of S/P Fall (21 Oct 2022 11:31)    pt seen in ed [  ], reg surg floor [ x  ], bed [ x ], chair at bedside [   ], awake and responsive [ x ], lethargic [  ],    nad [  ]        Allergies    No Known Allergies        Vitals    T(F): 97.4 (10-22-22 @ 05:10), Max: 98.8 (10-21-22 @ 20:37)  HR: 82 (10-22-22 @ 05:10) (82 - 110)  BP: 118/48 (10-22-22 @ 05:10) (111/53 - 133/59)  RR: 18 (10-22-22 @ 05:10) (18 - 18)  SpO2: 99% (10-22-22 @ 05:10) (95% - 100%)  Wt(kg): --  CAPILLARY BLOOD GLUCOSE      POCT Blood Glucose.: 87 mg/dL (20 Oct 2022 23:56)      Labs                          10.5   10.46 )-----------( 219      ( 21 Oct 2022 05:25 )             34.5       10-21    142  |  104  |  17  ----------------------------<  73  4.1   |  34<H>  |  0.55    Ca    8.5      21 Oct 2022 05:25  Phos  3.5     10-21  Mg     1.9     10-21              Clean Catch Clean Catch (Midstream)  10-16 @ 01:53   >100,000 CFU/ml Escherichia coli  >100,000 CFU/ml Escherichia coli ESBL  --  Escherichia coli ESBL  Escherichia coli          Radiology Results      Meds    MEDICATIONS  (STANDING):  acetaminophen     Tablet .. 1000 milliGRAM(s) Oral every 8 hours  ALBUTerol    90 MICROgram(s) HFA Inhaler      albuterol/ipratropium for Nebulization 3 milliLiter(s) Nebulizer every 6 hours  amLODIPine   Tablet 2.5 milliGRAM(s) Oral daily  enoxaparin Injectable 30 milliGRAM(s) SubCutaneous every 12 hours  ertapenem  IVPB      ertapenem  IVPB 1000 milliGRAM(s) IV Intermittent every 24 hours  lidocaine   4% Patch 1 Patch Transdermal every 24 hours  multivitamin 1 Tablet(s) Oral daily  pantoprazole    Tablet 40 milliGRAM(s) Oral before breakfast  polyethylene glycol 3350 17 Gram(s) Oral at bedtime  senna 2 Tablet(s) Oral at bedtime  sodium chloride 0.9%. 1000 milliLiter(s) (50 mL/Hr) IV Continuous <Continuous>      MEDICATIONS  (PRN):  magnesium hydroxide Suspension 30 milliLiter(s) Oral daily PRN Constipation  ondansetron Injectable 4 milliGRAM(s) IV Push every 6 hours PRN Nausea and/or Vomiting  oxyCODONE    IR 2.5 milliGRAM(s) Oral every 4 hours PRN Moderate Pain (4 - 6)  oxyCODONE    IR 5 milliGRAM(s) Oral every 6 hours PRN Severe Pain (7 - 10)      Physical Exam    Neuro :  no focal deficits  Respiratory: CTA B/L  CV: RRR, S1S2, no murmurs,   Abdominal: Soft, NT, ND +BS,  Extremities: No edema, + peripheral pulses, right hip dressing cdi         ASSESSMENT    right posterior dislocation of DOMONIQUE  s/p fall,   uti  h/o arthritis,   Depression,   scoliosis,   dementia         PLAN    Right hip open reduction of spontaneous dislocation, hip 20-Oct-2022    ortho f/u   hip precautions  gi and dvt prophylaxis  phys tx eval  cont lidocaine patch,   morphine prn   oxycodone prn   pain mgmt eval  cardio f/u   echo with EF = 55 to 60%. Grade I diastolic dysfunction. RV systolic pressure is moderately increased noted    pulm f/u   Oxygen supp. prn  Duoneb nebulizer solution   Amlodipine 2.5 mg PO  ucx and sens with esbl e coli   cont ertapenem   id cons  cont current meds       Patient is a 89y old  Female who presents with a chief complaint of S/P Fall (21 Oct 2022 11:31)    pt seen in ed [  ], reg surg floor [ x  ], bed [ x ], chair at bedside [   ], awake and responsive [ x ], lethargic [  ],    nad [  ]        Allergies    No Known Allergies        Vitals    T(F): 97.4 (10-22-22 @ 05:10), Max: 98.8 (10-21-22 @ 20:37)  HR: 82 (10-22-22 @ 05:10) (82 - 110)  BP: 118/48 (10-22-22 @ 05:10) (111/53 - 133/59)  RR: 18 (10-22-22 @ 05:10) (18 - 18)  SpO2: 99% (10-22-22 @ 05:10) (95% - 100%)  Wt(kg): --  CAPILLARY BLOOD GLUCOSE      POCT Blood Glucose.: 87 mg/dL (20 Oct 2022 23:56)      Labs                          10.5   10.46 )-----------( 219      ( 21 Oct 2022 05:25 )             34.5       10-21    142  |  104  |  17  ----------------------------<  73  4.1   |  34<H>  |  0.55    Ca    8.5      21 Oct 2022 05:25  Phos  3.5     10-21  Mg     1.9     10-21              Clean Catch Clean Catch (Midstream)  10-16 @ 01:53   >100,000 CFU/ml Escherichia coli  >100,000 CFU/ml Escherichia coli ESBL  --  Escherichia coli ESBL  Escherichia coli          Radiology Results      Meds    MEDICATIONS  (STANDING):  acetaminophen     Tablet .. 1000 milliGRAM(s) Oral every 8 hours  ALBUTerol    90 MICROgram(s) HFA Inhaler      albuterol/ipratropium for Nebulization 3 milliLiter(s) Nebulizer every 6 hours  amLODIPine   Tablet 2.5 milliGRAM(s) Oral daily  enoxaparin Injectable 30 milliGRAM(s) SubCutaneous every 12 hours  ertapenem  IVPB      ertapenem  IVPB 1000 milliGRAM(s) IV Intermittent every 24 hours  lidocaine   4% Patch 1 Patch Transdermal every 24 hours  multivitamin 1 Tablet(s) Oral daily  pantoprazole    Tablet 40 milliGRAM(s) Oral before breakfast  polyethylene glycol 3350 17 Gram(s) Oral at bedtime  senna 2 Tablet(s) Oral at bedtime  sodium chloride 0.9%. 1000 milliLiter(s) (50 mL/Hr) IV Continuous <Continuous>      MEDICATIONS  (PRN):  magnesium hydroxide Suspension 30 milliLiter(s) Oral daily PRN Constipation  ondansetron Injectable 4 milliGRAM(s) IV Push every 6 hours PRN Nausea and/or Vomiting  oxyCODONE    IR 2.5 milliGRAM(s) Oral every 4 hours PRN Moderate Pain (4 - 6)  oxyCODONE    IR 5 milliGRAM(s) Oral every 6 hours PRN Severe Pain (7 - 10)      Physical Exam    Neuro :  no focal deficits  Respiratory: CTA B/L  CV: RRR, S1S2, no murmurs,   Abdominal: Soft, NT, ND +BS,  Extremities: No edema, + peripheral pulses, right hip dressing cdi         ASSESSMENT    right posterior dislocation of DOMONIQUE  s/p fall,   uti  h/o arthritis,   Depression,   scoliosis,   dementia         PLAN    Right hip open reduction of spontaneous dislocation, hip 20-Oct-2022    ortho f/u   Daily Physical Therapy:  WBAT on RLE with appropriate assistive device  Posterior hip precautions   gi and dvt prophylaxis  phys tx eval  cont lidocaine patch,   morphine prn   oxycodone prn   pain mgmt eval noted  and rec daily x 8 [facility]   cardio f/u   echo with EF = 55 to 60%. Grade I diastolic dysfunction. RV systolic pressure is moderately increased noted    pulm f/u   Oxygen supp. prn  Duoneb nebulizer solution   Amlodipine 2.5 mg PO   incentive spirometer  ucx and sens with esbl e coli   id f/u   cont ertapenem  cont current meds

## 2022-10-23 LAB
ANION GAP SERPL CALC-SCNC: 6 MMOL/L — SIGNIFICANT CHANGE UP (ref 5–17)
BASOPHILS # BLD AUTO: 0.04 K/UL — SIGNIFICANT CHANGE UP (ref 0–0.2)
BASOPHILS NFR BLD AUTO: 0.5 % — SIGNIFICANT CHANGE UP (ref 0–2)
BUN SERPL-MCNC: 11 MG/DL — SIGNIFICANT CHANGE UP (ref 7–18)
CALCIUM SERPL-MCNC: 8.5 MG/DL — SIGNIFICANT CHANGE UP (ref 8.4–10.5)
CHLORIDE SERPL-SCNC: 105 MMOL/L — SIGNIFICANT CHANGE UP (ref 96–108)
CO2 SERPL-SCNC: 31 MMOL/L — SIGNIFICANT CHANGE UP (ref 22–31)
CREAT SERPL-MCNC: 0.39 MG/DL — LOW (ref 0.5–1.3)
EGFR: 92 ML/MIN/1.73M2 — SIGNIFICANT CHANGE UP
EOSINOPHIL # BLD AUTO: 0.47 K/UL — SIGNIFICANT CHANGE UP (ref 0–0.5)
EOSINOPHIL NFR BLD AUTO: 6.1 % — HIGH (ref 0–6)
FOLATE SERPL-MCNC: >20 NG/ML — SIGNIFICANT CHANGE UP
GLUCOSE SERPL-MCNC: 91 MG/DL — SIGNIFICANT CHANGE UP (ref 70–99)
HCT VFR BLD CALC: 31.3 % — LOW (ref 34.5–45)
HGB BLD-MCNC: 9.8 G/DL — LOW (ref 11.5–15.5)
IMM GRANULOCYTES NFR BLD AUTO: 0.4 % — SIGNIFICANT CHANGE UP (ref 0–0.9)
LYMPHOCYTES # BLD AUTO: 1.28 K/UL — SIGNIFICANT CHANGE UP (ref 1–3.3)
LYMPHOCYTES # BLD AUTO: 16.6 % — SIGNIFICANT CHANGE UP (ref 13–44)
MAGNESIUM SERPL-MCNC: 2 MG/DL — SIGNIFICANT CHANGE UP (ref 1.6–2.6)
MCHC RBC-ENTMCNC: 31.3 GM/DL — LOW (ref 32–36)
MCHC RBC-ENTMCNC: 32 PG — SIGNIFICANT CHANGE UP (ref 27–34)
MCV RBC AUTO: 102.3 FL — HIGH (ref 80–100)
MONOCYTES # BLD AUTO: 0.53 K/UL — SIGNIFICANT CHANGE UP (ref 0–0.9)
MONOCYTES NFR BLD AUTO: 6.9 % — SIGNIFICANT CHANGE UP (ref 2–14)
NEUTROPHILS # BLD AUTO: 5.38 K/UL — SIGNIFICANT CHANGE UP (ref 1.8–7.4)
NEUTROPHILS NFR BLD AUTO: 69.5 % — SIGNIFICANT CHANGE UP (ref 43–77)
NRBC # BLD: 0 /100 WBCS — SIGNIFICANT CHANGE UP (ref 0–0)
PHOSPHATE SERPL-MCNC: 2.9 MG/DL — SIGNIFICANT CHANGE UP (ref 2.5–4.5)
PLATELET # BLD AUTO: 193 K/UL — SIGNIFICANT CHANGE UP (ref 150–400)
POTASSIUM SERPL-MCNC: 4.1 MMOL/L — SIGNIFICANT CHANGE UP (ref 3.5–5.3)
POTASSIUM SERPL-SCNC: 4.1 MMOL/L — SIGNIFICANT CHANGE UP (ref 3.5–5.3)
RBC # BLD: 3.06 M/UL — LOW (ref 3.8–5.2)
RBC # FLD: 12.4 % — SIGNIFICANT CHANGE UP (ref 10.3–14.5)
SODIUM SERPL-SCNC: 142 MMOL/L — SIGNIFICANT CHANGE UP (ref 135–145)
VIT B12 SERPL-MCNC: >2000 PG/ML — HIGH (ref 232–1245)
WBC # BLD: 7.73 K/UL — SIGNIFICANT CHANGE UP (ref 3.8–10.5)
WBC # FLD AUTO: 7.73 K/UL — SIGNIFICANT CHANGE UP (ref 3.8–10.5)

## 2022-10-23 RX ORDER — ERTAPENEM SODIUM 1 G/1
1000 INJECTION, POWDER, LYOPHILIZED, FOR SOLUTION INTRAMUSCULAR; INTRAVENOUS EVERY 24 HOURS
Refills: 0 | Status: DISCONTINUED | OUTPATIENT
Start: 2022-10-24 | End: 2022-10-27

## 2022-10-23 RX ADMIN — ERTAPENEM SODIUM 120 MILLIGRAM(S): 1 INJECTION, POWDER, LYOPHILIZED, FOR SOLUTION INTRAMUSCULAR; INTRAVENOUS at 09:13

## 2022-10-23 RX ADMIN — LIDOCAINE 1 PATCH: 4 CREAM TOPICAL at 17:28

## 2022-10-23 RX ADMIN — Medication 3 MILLILITER(S): at 14:13

## 2022-10-23 RX ADMIN — Medication 3 MILLILITER(S): at 08:51

## 2022-10-23 RX ADMIN — Medication 1 TABLET(S): at 13:06

## 2022-10-23 RX ADMIN — PANTOPRAZOLE SODIUM 40 MILLIGRAM(S): 20 TABLET, DELAYED RELEASE ORAL at 06:21

## 2022-10-23 RX ADMIN — Medication 1000 MILLIGRAM(S): at 07:23

## 2022-10-23 RX ADMIN — ENOXAPARIN SODIUM 30 MILLIGRAM(S): 100 INJECTION SUBCUTANEOUS at 21:01

## 2022-10-23 RX ADMIN — LIDOCAINE 1 PATCH: 4 CREAM TOPICAL at 19:30

## 2022-10-23 RX ADMIN — LIDOCAINE 1 PATCH: 4 CREAM TOPICAL at 07:22

## 2022-10-23 RX ADMIN — AMLODIPINE BESYLATE 2.5 MILLIGRAM(S): 2.5 TABLET ORAL at 06:21

## 2022-10-23 RX ADMIN — Medication 3 MILLILITER(S): at 20:23

## 2022-10-23 RX ADMIN — Medication 1000 MILLIGRAM(S): at 06:21

## 2022-10-23 RX ADMIN — ENOXAPARIN SODIUM 30 MILLIGRAM(S): 100 INJECTION SUBCUTANEOUS at 09:13

## 2022-10-23 NOTE — PROGRESS NOTE ADULT - SUBJECTIVE AND OBJECTIVE BOX
Patient is seen and examined at the bed side, is afebrile. She is tolerating Ertapenem well.      REVIEW OF SYSTEMS: All other review systems are negative      ALLERGIES: No Known Allergies      Vital Signs Last 24 Hrs  T(C): 36.6 (23 Oct 2022 14:44), Max: 36.7 (22 Oct 2022 20:55)  T(F): 97.9 (23 Oct 2022 14:44), Max: 98 (22 Oct 2022 20:55)  HR: 100 (23 Oct 2022 14:44) (80 - 100)  BP: 120/57 (23 Oct 2022 14:44) (120/57 - 136/51)  BP(mean): 74 (23 Oct 2022 05:44) (72 - 74)  RR: 18 (23 Oct 2022 14:44) (18 - 18)  SpO2: 100% (23 Oct 2022 14:44) (97% - 100%)    Parameters below as of 23 Oct 2022 14:44  Patient On (Oxygen Delivery Method): nasal cannula  O2 Flow (L/min): 2      PHYSICAL EXAM:  GENERAL: Not in distress   CHEST/LUNG:  Not using accessory muscles   HEART: s1 and s2 present  ABDOMEN:  Nontender and  Nondistended  EXTREMITIES: No pedal  edema  CNS: Awake and Alert      LABS:                        9.8    7.73  )-----------( 193      ( 23 Oct 2022 06:28 )             31.3                           9.6    8.32  )-----------( 183      ( 22 Oct 2022 05:44 )             30.7       10-23    142  |  105  |  11  ----------------------------<  91  4.1   |  31  |  0.39<L>    Ca    8.5      23 Oct 2022 06:28  Phos  2.9     10-23  Mg     2.0     10-23      10-22    142  |  106  |  16  ----------------------------<  104<H>  3.9   |  33<H>  |  0.44<L>    Ca    8.3<L>      22 Oct 2022 05:44  Phos  2.9     10-22  Mg     2.0     10-22        CAPILLARY BLOOD GLUCOSE  POCT Blood Glucose.: 87 mg/dL (20 Oct 2022 23:56)        MEDICATIONS  (STANDING):    ALBUTerol    90 MICROgram(s) HFA Inhaler      albuterol/ipratropium for Nebulization 3 milliLiter(s) Nebulizer every 6 hours  amLODIPine   Tablet 2.5 milliGRAM(s) Oral daily  enoxaparin Injectable 30 milliGRAM(s) SubCutaneous every 12 hours  lidocaine   4% Patch 1 Patch Transdermal every 24 hours  multivitamin 1 Tablet(s) Oral daily  pantoprazole    Tablet 40 milliGRAM(s) Oral before breakfast  polyethylene glycol 3350 17 Gram(s) Oral at bedtime  senna 2 Tablet(s) Oral at bedtime  sodium chloride 0.9%. 1000 milliLiter(s) (50 mL/Hr) IV Continuous <Continuous>      RADIOLOGY & ADDITIONAL TESTS:    10/15/22 : CT Cervical Spine No Cont (10.15.22 @ 11:40) >CT HEAD: Mild periventricular white matter ischemia.Tiny indeterminate   lacunar infarction in the anterior limb of the LEFT internal capsule. Mild to moderate cerebellar and temporal lobe atrophy.    CT cervical spine:   No vertebral fracture is recognized.  Straightening with grade 1 anterior spondylolisthesis at C2-3, C3-4 and C7-T1 on a   degenerative basis.    Advanced degenerative disc disease and spondylosis at C3-4 C7-T1 with narrowing of the BILATERAL C2-3 through C6-7 neural   foramina due to uncovertebral spurring and facet osteophytic hypertrophy.   Degenerative cord impingement is seen at C4-5 through C6-7 due to  posterior osteophytic ridge/disc complexes.    10/15/22: CT Head No Cont (10.15.22 @ 11:39) >    CT HEAD: Mild periventricular white matter ischemia. Tiny indeterminate lacunar infarction in the anterior limb of the LEFT internal capsule.   Mild to moderate cerebellar and temporal lobe atrophy.      10/15/22: Xray Pelvis AP only (10.15.22 @ 11:33) >    Dislocation femoral component of right hip prosthesis.        MICROBIOLOGY DATA:    Culture - Urine (10.16.22 @ 01:53)   - Amikacin: S <=16   - Amikacin: S <=16   - Amoxicillin/Clavulanic Acid: S <=8/4   - Amoxicillin/Clavulanic Acid: S <=8/4 Consider reserving for cystitis when ampicillin/sulbactam is resistant   - Ampicillin: R >16 These ampicillin results predict results for amoxicillin   - Ampicillin: R >16 These ampicillin results predict results for amoxicillin   - Ampicillin/Sulbactam: S 8/4 Enterobacter, Klebsiella aerogenes, Citrobacter, and Serratia may develop resistance during prolonged therapy (3-4 days)   - Ampicillin/Sulbactam: R >16/8 Enterobacter, Klebsiella aerogenes, Citrobacter, and Serratia may develop resistance during prolonged therapy (3-4 days)   - Aztreonam: R >16   - Aztreonam: S <=4   - Cefazolin: S <=2 For uncomplicated UTI with K. pneumoniae, E. coli, or P. mirablis: BORIS <=16 is sensitive and BORIS >=32 is resistant. This also predicts results for oral agents cefaclor, cefdinir, cefpodoxime, cefprozil, cefuroxime axetil, cephalexin and locarbef for uncomplicated UTI. Note that some isolates may be susceptible to these agents while testing resistant to cefazolin.   - Cefazolin: R >16 For uncomplicated UTI with K. pneumoniae, E. coli, or P. mirablis: BORIS <=16 is sensitive and BORIS >=32 is resistant. This also predicts results for oral agents cefaclor, cefdinir, cefpodoxime, cefprozil, cefuroxime axetil, cephalexin and locarbef for uncomplicated UTI. Note that some isolates may be susceptible to these agents while testing resistant to cefazolin.   - Cefepime: R >16   - Cefepime: S <=2   - Cefoxitin: S <=8   - Ceftriaxone: S <=1 Enterobacter, Klebsiella aerogenes, Citrobacter, and Serratia may develop resistance during prolonged therapy   - Ceftriaxone: R >32 Enterobacter, Klebsiella aerogenes, Citrobacter, and Serratia may develop resistance during prolonged therapy   - Ciprofloxacin: R >2   - Ciprofloxacin: S <=0.25   - Ertapenem: S <=0.5   - Ertapenem: S <=0.5   - Gentamicin: R >8   - Gentamicin: R >8   - Imipenem: S <=1   - Imipenem: S <=1   - Levofloxacin: R >4   - Levofloxacin: S <=0.5   - Meropenem: S <=1   - Meropenem: S <=1   - Nitrofurantoin: S <=32 Should not be used to treat pyelonephritis   - Nitrofurantoin: S <=32 Should not be used to treat pyelonephritis   - Piperacillin/Tazobactam: S <=8   - Piperacillin/Tazobactam: S 16   - Tigecycline: S <=2   - Tigecycline: S <=2   - Tobramycin: I 8   - Tobramycin: I 8   - Trimethoprim/Sulfamethoxazole: S <=0.5/9.5   - Trimethoprim/Sulfamethoxazole: R >2/38   Specimen Source: Clean Catch Clean Catch (Midstream)   Culture Results:   >100,000 CFU/ml Escherichia coli   >100,000 CFU/ml Escherichia coli ESBL   Organism Identification: Escherichia coli ESBL   Escherichia coli   Organism: Escherichia coli ESBL   Organism: Escherichia coli

## 2022-10-23 NOTE — PROGRESS NOTE ADULT - ASSESSMENT
Patient is a 89y old  Female, from home, hard of hearing, lives alone,  with PMHx of arthritis, Depression, scoliosis, dementia,  now presents to the ER on 10/15/22 for evaluation of a fall. Patient states that she slipped and fell landing on her right side. Patient denies any head trauma or LOC but endorses right hip pain. ON admission, she found to have no fever, but tachycardia., Leukocytosis and positive urine analysis. The Xray of pelvis shows Dislocation femoral component of right hip prosthesis. She underwent Open reduction of Right hip on 10/20/22. The ID consult requested today, 10/21/22, to assist with antibiotic treatment for ESBL E.coli UTI.    # Sepsis ( Tachycardia + Leukocytosis) on admission- resolved  # UTI - 10/16 and Urine Cx grew ESBL  E.coli  # S/p Fall-  Xray of pelvis shows Dislocation femoral component of right hip prosthesis.- s/p Open reduction on 10/20/22    would recommend:    1. TOV  2. Continue Ertapenem to cover ESBL  E.coli in urine, until 10/27/22  3. Monitor kidney function and IVF  4. Pain management as needed    d/w Dr. Varner     Attending Attestation:    Spent more than 35 minutes on total encounter, more than 50 % of the visit was spent counseling and/or coordinating care by the Attending physician.

## 2022-10-23 NOTE — PROGRESS NOTE ADULT - SUBJECTIVE AND OBJECTIVE BOX
CHIEF COMPLAINT:Patient is a 89y old  Female who presents with a chief complaint of Dislocated right hip.Pt appears comfortable.    	  REVIEW OF SYSTEMS:  CONSTITUTIONAL: No fever, weight loss, or fatigue  EYES: No eye pain, visual disturbances, or discharge  ENT:  No difficulty hearing, tinnitus, vertigo; No sinus or throat pain  NECK: No pain or stiffness  RESPIRATORY: No cough, wheezing, chills or hemoptysis; No Shortness of Breath  CARDIOVASCULAR: No chest pain, palpitations, passing out, dizziness, or leg swelling  GASTROINTESTINAL: No abdominal or epigastric pain. No nausea, vomiting, or hematemesis; No diarrhea or constipation. No melena or hematochezia.  GENITOURINARY: No dysuria, frequency, hematuria, or incontinence  NEUROLOGICAL: No headaches, memory loss, loss of strength, numbness, or tremors  SKIN: No itching, burning, rashes, or lesions   LYMPH Nodes: No enlarged glands  ENDOCRINE: No heat or cold intolerance; No hair loss  MUSCULOSKELETAL: No joint pain or swelling; No muscle, back, or extremity pain  PSYCHIATRIC: No depression, anxiety, mood swings, or difficulty sleeping  HEME/LYMPH: No easy bruising, or bleeding gums  ALLERGY AND IMMUNOLOGIC: No hives or eczema	      PHYSICAL EXAM:  T(C): 36.4 (10-23-22 @ 05:44), Max: 36.9 (10-22-22 @ 14:50)  HR: 89 (10-23-22 @ 11:02) (80 - 93)  BP: 125/60 (10-23-22 @ 11:02) (121/57 - 136/51)  RR: 18 (10-23-22 @ 05:44) (18 - 18)  SpO2: 98% (10-23-22 @ 11:02) (97% - 100%)  Wt(kg): --  I&O's Summary    22 Oct 2022 07:01  -  23 Oct 2022 07:00  --------------------------------------------------------  IN: 0 mL / OUT: 800 mL / NET: -800 mL        Appearance: Normal	  HEENT:   Normal oral mucosa, PERRL, EOMI	  Lymphatic: No lymphadenopathy  Cardiovascular: Normal S1 S2, No JVD, No murmurs, No edema  Respiratory: Lungs clear to auscultation	  Psychiatry: A & O x 3, Mood & affect appropriate  Gastrointestinal:  Soft, Non-tender, + BS	  Skin: No rashes, No ecchymoses, No cyanosis	  Neurologic: Non-focal  Extremities: Normal range of motion, No clubbing, cyanosis or edema  Vascular: Peripheral pulses palpable 2+ bilaterally    MEDICATIONS  (STANDING):  ALBUTerol    90 MICROgram(s) HFA Inhaler      albuterol/ipratropium for Nebulization 3 milliLiter(s) Nebulizer every 6 hours  amLODIPine   Tablet 2.5 milliGRAM(s) Oral daily  enoxaparin Injectable 30 milliGRAM(s) SubCutaneous every 12 hours  lidocaine   4% Patch 1 Patch Transdermal every 24 hours  multivitamin 1 Tablet(s) Oral daily  pantoprazole    Tablet 40 milliGRAM(s) Oral before breakfast  polyethylene glycol 3350 17 Gram(s) Oral at bedtime  senna 2 Tablet(s) Oral at bedtime  sodium chloride 0.9%. 1000 milliLiter(s) (50 mL/Hr) IV Continuous <Continuous>      	  	  LABS:	 	                       9.8    7.73  )-----------( 193      ( 23 Oct 2022 06:28 )             31.3     10-23    142  |  105  |  11  ----------------------------<  91  4.1   |  31  |  0.39<L>    Ca    8.5      23 Oct 2022 06:28  Phos  2.9     10-23  Mg     2.0     10-23    Lipid Profile: Cholesterol 144  LDL --  HDL 50  TG 69  Ldl calc 80    TSH: Thyroid Stimulating Hormone, Serum: 2.66 uU/mL (10-16 @ 05:30)

## 2022-10-23 NOTE — PROGRESS NOTE ADULT - SUBJECTIVE AND OBJECTIVE BOX
89yFemale    Diagnosis:  S/p Open Reduction Right DOMONIQUE POD#3    Patient was seen and evaluated at bedside. Patient with no acute complaints.   Denies CP/SOB, dyspnea, paresthesias, N/V/D, palpitations.     Vital Signs Last 24 Hrs  T(C): 36.4 (23 Oct 2022 05:44), Max: 36.9 (22 Oct 2022 14:50)  T(F): 97.6 (23 Oct 2022 05:44), Max: 98.5 (22 Oct 2022 14:50)  HR: 80 (23 Oct 2022 05:44) (80 - 93)  BP: 134/55 (23 Oct 2022 05:44) (124/66 - 136/51)  BP(mean): 74 (23 Oct 2022 05:44) (72 - 74)  RR: 18 (23 Oct 2022 05:44) (18 - 18)  SpO2: 100% (23 Oct 2022 05:44) (97% - 100%)    Parameters below as of 23 Oct 2022 05:44  Patient On (Oxygen Delivery Method): nasal cannula  O2 Flow (L/min): 2    I&O's Detail    22 Oct 2022 07:01  -  23 Oct 2022 07:00  --------------------------------------------------------  IN:  Total IN: 0 mL    OUT:    Indwelling Catheter - Urethral (mL): 800 mL  Total OUT: 800 mL    Total NET: -800 mL          Physical Exam:      R Hip:  Dressing is C/D/I. Skin is pink and warm.. No erythema. SILT.  Wound with no drainage, healing well.   Lower extremity:  No calf tenderness, calves are soft. 2+pulses. NVI. EHL/TA/gastrocnemius intact B/L.  Good capillary refill. Warm, well-perfused.                           9.8    7.73  )-----------( 193      ( 23 Oct 2022 06:28 )             31.3     10-23    142  |  105  |  11  ----------------------------<  91  4.1   |  31  |  0.39<L>    Ca    8.5      23 Oct 2022 06:28  Phos  2.9     10-23  Mg     2.0     10-23        Impression:  89yFemale S/p Open Reduction Right DOMONIQUE POD#3  Plan:  -  Pain management  -  Dvt prophylaxis with Lovenox  -  Daily Physical Theapy:  WBAT  of right lower extremity  -  Discharge planning: Home Vs. Rehab  -  Posterior hip precautions. Abduction pillow.

## 2022-10-23 NOTE — PROGRESS NOTE ADULT - SUBJECTIVE AND OBJECTIVE BOX
Patient is a 89y old  Female who presents with a chief complaint of Fracture of unspecified part of neck of unspecified femur, initial encounter for closed fracture     (22 Oct 2022 15:04)    pt seen in ed [  ], reg surg floor [ x  ], bed [ x ], chair at bedside [   ], awake and responsive [ x ], lethargic [  ],    nad [  ]      Allergies    No Known Allergies        Vitals    T(F): 97.6 (10-23-22 @ 05:44), Max: 98.5 (10-22-22 @ 14:50)  HR: 80 (10-23-22 @ 05:44) (80 - 93)  BP: 134/55 (10-23-22 @ 05:44) (124/66 - 136/51)  RR: 18 (10-23-22 @ 05:44) (18 - 18)  SpO2: 100% (10-23-22 @ 05:44) (97% - 100%)  Wt(kg): --  CAPILLARY BLOOD GLUCOSE          Labs                          9.6    8.32  )-----------( 183      ( 22 Oct 2022 05:44 )             30.7       10-22    142  |  106  |  16  ----------------------------<  104<H>  3.9   |  33<H>  |  0.44<L>    Ca    8.3<L>      22 Oct 2022 05:44  Phos  2.9     10-22  Mg     2.0     10-22              Clean Catch Clean Catch (Midstream)  10-16 @ 01:53   >100,000 CFU/ml Escherichia coli  >100,000 CFU/ml Escherichia coli ESBL  --  Escherichia coli ESBL  Escherichia coli          Radiology Results      Meds    MEDICATIONS  (STANDING):  ALBUTerol    90 MICROgram(s) HFA Inhaler      albuterol/ipratropium for Nebulization 3 milliLiter(s) Nebulizer every 6 hours  amLODIPine   Tablet 2.5 milliGRAM(s) Oral daily  enoxaparin Injectable 30 milliGRAM(s) SubCutaneous every 12 hours  ertapenem  IVPB      ertapenem  IVPB 1000 milliGRAM(s) IV Intermittent every 24 hours  lidocaine   4% Patch 1 Patch Transdermal every 24 hours  multivitamin 1 Tablet(s) Oral daily  pantoprazole    Tablet 40 milliGRAM(s) Oral before breakfast  polyethylene glycol 3350 17 Gram(s) Oral at bedtime  senna 2 Tablet(s) Oral at bedtime  sodium chloride 0.9%. 1000 milliLiter(s) (50 mL/Hr) IV Continuous <Continuous>      MEDICATIONS  (PRN):  magnesium hydroxide Suspension 30 milliLiter(s) Oral daily PRN Constipation  ondansetron Injectable 4 milliGRAM(s) IV Push every 6 hours PRN Nausea and/or Vomiting  oxyCODONE    IR 2.5 milliGRAM(s) Oral every 4 hours PRN Moderate Pain (4 - 6)  oxyCODONE    IR 5 milliGRAM(s) Oral every 6 hours PRN Severe Pain (7 - 10)      Physical Exam    Neuro :  no focal deficits  Respiratory: CTA B/L  CV: RRR, S1S2, no murmurs,   Abdominal: Soft, NT, ND +BS,  Extremities: No edema, + peripheral pulses, right hip dressing cdi         ASSESSMENT    right posterior dislocation of DOMONIQUE  s/p fall,   uti  h/o arthritis,   Depression,   scoliosis,   dementia         PLAN    Right hip open reduction of spontaneous dislocation, hip 20-Oct-2022    ortho f/u   Daily Physical Therapy:  WBAT on RLE with appropriate assistive device  Posterior hip precautions   gi and dvt prophylaxis  phys tx eval  cont lidocaine patch,   morphine prn   oxycodone prn   pain mgmt eval noted  and rec daily x 8 [facility] at [facility]   cardio f/u   echo with EF = 55 to 60%. Grade I diastolic dysfunction. RV systolic pressure is moderately increased noted    pulm f/u   Oxygen supp. prn  Duoneb nebulizer solution   Amlodipine 2.5 mg PO   incentive spirometer  ucx and sens with esbl e coli   id f/u   cont ertapenem  cont current meds   Patient is a 89y old  Female who presents with a chief complaint of Fracture of unspecified part of neck of unspecified femur, initial encounter for closed fracture     (22 Oct 2022 15:04)    pt seen in ed [  ], reg surg floor [ x  ], bed [ x ], chair at bedside [   ], awake and responsive [ x ], lethargic [  ],    nad [  ]      Allergies    No Known Allergies        Vitals    T(F): 97.6 (10-23-22 @ 05:44), Max: 98.5 (10-22-22 @ 14:50)  HR: 80 (10-23-22 @ 05:44) (80 - 93)  BP: 134/55 (10-23-22 @ 05:44) (124/66 - 136/51)  RR: 18 (10-23-22 @ 05:44) (18 - 18)  SpO2: 100% (10-23-22 @ 05:44) (97% - 100%)  Wt(kg): --  CAPILLARY BLOOD GLUCOSE          Labs                          9.6    8.32  )-----------( 183      ( 22 Oct 2022 05:44 )             30.7       10-22    142  |  106  |  16  ----------------------------<  104<H>  3.9   |  33<H>  |  0.44<L>    Ca    8.3<L>      22 Oct 2022 05:44  Phos  2.9     10-22  Mg     2.0     10-22              Clean Catch Clean Catch (Midstream)  10-16 @ 01:53   >100,000 CFU/ml Escherichia coli  >100,000 CFU/ml Escherichia coli ESBL  --  Escherichia coli ESBL  Escherichia coli          Radiology Results      Meds    MEDICATIONS  (STANDING):  ALBUTerol    90 MICROgram(s) HFA Inhaler      albuterol/ipratropium for Nebulization 3 milliLiter(s) Nebulizer every 6 hours  amLODIPine   Tablet 2.5 milliGRAM(s) Oral daily  enoxaparin Injectable 30 milliGRAM(s) SubCutaneous every 12 hours  ertapenem  IVPB      ertapenem  IVPB 1000 milliGRAM(s) IV Intermittent every 24 hours  lidocaine   4% Patch 1 Patch Transdermal every 24 hours  multivitamin 1 Tablet(s) Oral daily  pantoprazole    Tablet 40 milliGRAM(s) Oral before breakfast  polyethylene glycol 3350 17 Gram(s) Oral at bedtime  senna 2 Tablet(s) Oral at bedtime  sodium chloride 0.9%. 1000 milliLiter(s) (50 mL/Hr) IV Continuous <Continuous>      MEDICATIONS  (PRN):  magnesium hydroxide Suspension 30 milliLiter(s) Oral daily PRN Constipation  ondansetron Injectable 4 milliGRAM(s) IV Push every 6 hours PRN Nausea and/or Vomiting  oxyCODONE    IR 2.5 milliGRAM(s) Oral every 4 hours PRN Moderate Pain (4 - 6)  oxyCODONE    IR 5 milliGRAM(s) Oral every 6 hours PRN Severe Pain (7 - 10)      Physical Exam    Neuro :  no focal deficits  Respiratory: CTA B/L  CV: RRR, S1S2, no murmurs,   Abdominal: Soft, NT, ND +BS,  Extremities: No edema, + peripheral pulses, right hip dressing cdi         ASSESSMENT    right posterior dislocation of DOMONIQUE  s/p fall,   uti   macrocytic anemia  h/o arthritis,   Depression,   scoliosis,   dementia         PLAN    Right hip open reduction of spontaneous dislocation, hip 20-Oct-2022    ortho f/u   Daily Physical Therapy:  WBAT on RLE with appropriate assistive device  Posterior hip precautions   gi and dvt prophylaxis  phys tx eval  cont lidocaine patch,   morphine prn   oxycodone prn   pain mgmt eval noted  and rec daily x 8 [facility]   cardio f/u   echo with EF = 55 to 60%. Grade I diastolic dysfunction. RV systolic pressure is moderately increased noted    pulm f/u   Oxygen supp. prn  Duoneb nebulizer solution   Amlodipine 2.5 mg PO   incentive spirometer  ucx and sens with esbl e coli   id f/u   cont ertapenem   f/u vit b12 and folate  cont current meds   d/c plan to [facility]

## 2022-10-23 NOTE — PROGRESS NOTE ADULT - SUBJECTIVE AND OBJECTIVE BOX
Patient is a 89y old  Female who presents with a chief complaint of Fracture of unspecified part of neck of unspecified femur, initial encounter for closed fracture  Awake, alert, comfortable in bed in NAD   (22 Oct 2022 15:04)      INTERVAL HPI/OVERNIGHT EVENTS:      VITAL SIGNS:  T(F): 97.6 (10-23-22 @ 05:44)  HR: 80 (10-23-22 @ 05:44)  BP: 134/55 (10-23-22 @ 05:44)  RR: 18 (10-23-22 @ 05:44)  SpO2: 100% (10-23-22 @ 05:44)  Wt(kg): --  I&O's Detail    22 Oct 2022 07:01  -  23 Oct 2022 07:00  --------------------------------------------------------  IN:  Total IN: 0 mL    OUT:    Indwelling Catheter - Urethral (mL): 800 mL  Total OUT: 800 mL    Total NET: -800 mL              REVIEW OF SYSTEMS:    CONSTITUTIONAL:  No fevers, chills, sweats    HEENT:  Eyes:  No diplopia or blurred vision. ENT:  No earache, sore throat or runny nose.    CARDIOVASCULAR:  No pressure, squeezing, tightness, or heaviness about the chest; no palpitations.    RESPIRATORY:  Per HPI    GASTROINTESTINAL:  No abdominal pain, nausea, vomiting or diarrhea.    GENITOURINARY:  No dysuria, frequency or urgency.    NEUROLOGIC:  No paresthesias, fasciculations, seizures or weakness.    PSYCHIATRIC:  No disorder of thought or mood.      PHYSICAL EXAM:    Constitutional: Well developed and nourished  Eyes:Perrla  ENMT: normal  Neck:supple  Respiratory: good air entry  Cardiovascular: S1 S2 regular  Gastrointestinal: Soft, Non tender  Extremities: No edema  Vascular:normal  Neurological:Awake, alert,Ox3  Musculoskeletal:Normal      MEDICATIONS  (STANDING):  ALBUTerol    90 MICROgram(s) HFA Inhaler      albuterol/ipratropium for Nebulization 3 milliLiter(s) Nebulizer every 6 hours  amLODIPine   Tablet 2.5 milliGRAM(s) Oral daily  enoxaparin Injectable 30 milliGRAM(s) SubCutaneous every 12 hours  lidocaine   4% Patch 1 Patch Transdermal every 24 hours  multivitamin 1 Tablet(s) Oral daily  pantoprazole    Tablet 40 milliGRAM(s) Oral before breakfast  polyethylene glycol 3350 17 Gram(s) Oral at bedtime  senna 2 Tablet(s) Oral at bedtime  sodium chloride 0.9%. 1000 milliLiter(s) (50 mL/Hr) IV Continuous <Continuous>    MEDICATIONS  (PRN):  magnesium hydroxide Suspension 30 milliLiter(s) Oral daily PRN Constipation  ondansetron Injectable 4 milliGRAM(s) IV Push every 6 hours PRN Nausea and/or Vomiting  oxyCODONE    IR 2.5 milliGRAM(s) Oral every 4 hours PRN Moderate Pain (4 - 6)  oxyCODONE    IR 5 milliGRAM(s) Oral every 6 hours PRN Severe Pain (7 - 10)      Allergies    No Known Allergies    Intolerances        LABS:                        9.8    7.73  )-----------( 193      ( 23 Oct 2022 06:28 )             31.3     10-23    142  |  105  |  11  ----------------------------<  91  4.1   |  31  |  0.39<L>    Ca    8.5      23 Oct 2022 06:28  Phos  2.9     10-23  Mg     2.0     10-23                CAPILLARY BLOOD GLUCOSE            RADIOLOGY & ADDITIONAL TESTS:    CXR:    Ct scan chest:    ekg;    echo:

## 2022-10-23 NOTE — PROGRESS NOTE ADULT - ASSESSMENT
89 year old female, from home, hard of hearing, lives alone,  with PMHx of arthritis, Depression, scoliosis, dementia presents after a fall. Patient states that she slipped and fell landing on her right side with hip dislocation,UTI-ESBL.  1.PT.  2.Ortho f/u.  3.UTI-Abx to complete 5 days-d/w ..  4.Pain control.  5.GI and DVT prophylaxis.

## 2022-10-24 ENCOUNTER — TRANSCRIPTION ENCOUNTER (OUTPATIENT)
Age: 87
End: 2022-10-24

## 2022-10-24 LAB
ANION GAP SERPL CALC-SCNC: 5 MMOL/L — SIGNIFICANT CHANGE UP (ref 5–17)
BASOPHILS # BLD AUTO: 0.05 K/UL — SIGNIFICANT CHANGE UP (ref 0–0.2)
BASOPHILS NFR BLD AUTO: 0.7 % — SIGNIFICANT CHANGE UP (ref 0–2)
BUN SERPL-MCNC: 8 MG/DL — SIGNIFICANT CHANGE UP (ref 7–18)
CALCIUM SERPL-MCNC: 8.8 MG/DL — SIGNIFICANT CHANGE UP (ref 8.4–10.5)
CHLORIDE SERPL-SCNC: 102 MMOL/L — SIGNIFICANT CHANGE UP (ref 96–108)
CO2 SERPL-SCNC: 34 MMOL/L — HIGH (ref 22–31)
CREAT SERPL-MCNC: 0.39 MG/DL — LOW (ref 0.5–1.3)
EGFR: 92 ML/MIN/1.73M2 — SIGNIFICANT CHANGE UP
EOSINOPHIL # BLD AUTO: 0.52 K/UL — HIGH (ref 0–0.5)
EOSINOPHIL NFR BLD AUTO: 7.5 % — HIGH (ref 0–6)
GLUCOSE SERPL-MCNC: 85 MG/DL — SIGNIFICANT CHANGE UP (ref 70–99)
HCT VFR BLD CALC: 29.3 % — LOW (ref 34.5–45)
HGB BLD-MCNC: 9.1 G/DL — LOW (ref 11.5–15.5)
IMM GRANULOCYTES NFR BLD AUTO: 0.3 % — SIGNIFICANT CHANGE UP (ref 0–0.9)
LYMPHOCYTES # BLD AUTO: 1.2 K/UL — SIGNIFICANT CHANGE UP (ref 1–3.3)
LYMPHOCYTES # BLD AUTO: 17.4 % — SIGNIFICANT CHANGE UP (ref 13–44)
MAGNESIUM SERPL-MCNC: 1.9 MG/DL — SIGNIFICANT CHANGE UP (ref 1.6–2.6)
MCHC RBC-ENTMCNC: 31.1 GM/DL — LOW (ref 32–36)
MCHC RBC-ENTMCNC: 31.7 PG — SIGNIFICANT CHANGE UP (ref 27–34)
MCV RBC AUTO: 102.1 FL — HIGH (ref 80–100)
MONOCYTES # BLD AUTO: 0.62 K/UL — SIGNIFICANT CHANGE UP (ref 0–0.9)
MONOCYTES NFR BLD AUTO: 9 % — SIGNIFICANT CHANGE UP (ref 2–14)
NEUTROPHILS # BLD AUTO: 4.48 K/UL — SIGNIFICANT CHANGE UP (ref 1.8–7.4)
NEUTROPHILS NFR BLD AUTO: 65.1 % — SIGNIFICANT CHANGE UP (ref 43–77)
NRBC # BLD: 0 /100 WBCS — SIGNIFICANT CHANGE UP (ref 0–0)
PHOSPHATE SERPL-MCNC: 2.5 MG/DL — SIGNIFICANT CHANGE UP (ref 2.5–4.5)
PLATELET # BLD AUTO: 208 K/UL — SIGNIFICANT CHANGE UP (ref 150–400)
POTASSIUM SERPL-MCNC: 4.1 MMOL/L — SIGNIFICANT CHANGE UP (ref 3.5–5.3)
POTASSIUM SERPL-SCNC: 4.1 MMOL/L — SIGNIFICANT CHANGE UP (ref 3.5–5.3)
RBC # BLD: 2.87 M/UL — LOW (ref 3.8–5.2)
RBC # FLD: 12.5 % — SIGNIFICANT CHANGE UP (ref 10.3–14.5)
SODIUM SERPL-SCNC: 141 MMOL/L — SIGNIFICANT CHANGE UP (ref 135–145)
WBC # BLD: 6.89 K/UL — SIGNIFICANT CHANGE UP (ref 3.8–10.5)
WBC # FLD AUTO: 6.89 K/UL — SIGNIFICANT CHANGE UP (ref 3.8–10.5)

## 2022-10-24 PROCEDURE — 99231 SBSQ HOSP IP/OBS SF/LOW 25: CPT

## 2022-10-24 RX ORDER — OXYCODONE HYDROCHLORIDE 5 MG/1
2.5 TABLET ORAL EVERY 4 HOURS
Refills: 0 | Status: DISCONTINUED | OUTPATIENT
Start: 2022-10-24 | End: 2022-10-27

## 2022-10-24 RX ORDER — ACETAMINOPHEN 500 MG
650 TABLET ORAL EVERY 6 HOURS
Refills: 0 | Status: DISCONTINUED | OUTPATIENT
Start: 2022-10-24 | End: 2022-10-27

## 2022-10-24 RX ORDER — LIDOCAINE 4 G/100G
1 CREAM TOPICAL DAILY
Refills: 0 | Status: DISCONTINUED | OUTPATIENT
Start: 2022-10-24 | End: 2022-10-27

## 2022-10-24 RX ADMIN — Medication 3 MILLILITER(S): at 09:49

## 2022-10-24 RX ADMIN — LIDOCAINE 1 PATCH: 4 CREAM TOPICAL at 17:13

## 2022-10-24 RX ADMIN — ENOXAPARIN SODIUM 30 MILLIGRAM(S): 100 INJECTION SUBCUTANEOUS at 21:09

## 2022-10-24 RX ADMIN — Medication 3 MILLILITER(S): at 03:31

## 2022-10-24 RX ADMIN — Medication 3 MILLILITER(S): at 21:37

## 2022-10-24 RX ADMIN — PANTOPRAZOLE SODIUM 40 MILLIGRAM(S): 20 TABLET, DELAYED RELEASE ORAL at 06:51

## 2022-10-24 RX ADMIN — ENOXAPARIN SODIUM 30 MILLIGRAM(S): 100 INJECTION SUBCUTANEOUS at 09:54

## 2022-10-24 RX ADMIN — LIDOCAINE 1 PATCH: 4 CREAM TOPICAL at 19:59

## 2022-10-24 RX ADMIN — ERTAPENEM SODIUM 120 MILLIGRAM(S): 1 INJECTION, POWDER, LYOPHILIZED, FOR SOLUTION INTRAMUSCULAR; INTRAVENOUS at 17:13

## 2022-10-24 RX ADMIN — AMLODIPINE BESYLATE 2.5 MILLIGRAM(S): 2.5 TABLET ORAL at 06:51

## 2022-10-24 RX ADMIN — LIDOCAINE 1 PATCH: 4 CREAM TOPICAL at 05:12

## 2022-10-24 RX ADMIN — Medication 1 TABLET(S): at 11:11

## 2022-10-24 NOTE — PROGRESS NOTE ADULT - SUBJECTIVE AND OBJECTIVE BOX
Source of information: JUSTYNA LOMAX, Chart review  Patient language: English  : n/a    HPI:   89 year old female, from home, hard of hearing, lives alone,  with PMHx of arthritis, Depression, scoliosis, dementia presents after a fall. Patient states that she slipped and fell landing on her right side. Patient denies any head trauma or LOC but endorses right hip pain. Denies any headaches, numbness, tingling, fever, chills, nausea,  vomiting chest pain, SOB, abdominal pain, or change in bowel habits.     In the ED:  Afebrile, hemodynamically stable   CTH: tiny indeterminate lacunar infarcts, CT spine multiple degenerative changes  Xray hip: dislocation right hip   (15 Oct 2022 18:35)    Pt is admitted for R hip pain s/p Fall. hx. of dementia, [place].  XR Pelvis +R hip dislocation. Pt is s/p Open reduction of Right Total Hip dislocation on 10/20/2022. POD# 4. Pain consulted for acute right hip pain. Pt seen and examined at bedside this morning, sitting in chair. Patient with hx of dementia but able to answer questions appropriately. Reports pain on R hip 4/10 and tolerable.  SCALE USED: (1-10 VNRS). Pt describes pain as aching, soreness, non-radiating exacerbated by movement and palpation. Also reports sacral pain, aching, non-radiating, exacerbated by movement. Repositioned pt, RN to administer PRN pain meds. Pt Pt tolerating po. Denies lethargy, nausea, vomiting, constipation, itchiness. Patient stated goal for pain control: to be able to take deep breaths, get out of bed to chair and ambulate with tolerable pain control.     PAST MEDICAL & SURGICAL HISTORY:  Arthritis    No significant past surgical history    FAMILY HISTORY:    Social History:  Lives at home alone, no family, denies any alcohol or tobacco use (15 Oct 2022 18:35)   [x]Denies ETOH use, illicit drug use, and smoking     Allergies    No Known Allergies    MEDICATIONS  (STANDING):  ALBUTerol    90 MICROgram(s) HFA Inhaler      albuterol/ipratropium for Nebulization 3 milliLiter(s) Nebulizer every 6 hours  amLODIPine   Tablet 2.5 milliGRAM(s) Oral daily  enoxaparin Injectable 30 milliGRAM(s) SubCutaneous every 12 hours  ertapenem  IVPB 1000 milliGRAM(s) IV Intermittent every 24 hours  lidocaine   4% Patch 1 Patch Transdermal every 24 hours  multivitamin 1 Tablet(s) Oral daily  pantoprazole    Tablet 40 milliGRAM(s) Oral before breakfast  polyethylene glycol 3350 17 Gram(s) Oral at bedtime  senna 2 Tablet(s) Oral at bedtime  sodium chloride 0.9%. 1000 milliLiter(s) (50 mL/Hr) IV Continuous <Continuous>    MEDICATIONS  (PRN):  magnesium hydroxide Suspension 30 milliLiter(s) Oral daily PRN Constipation  ondansetron Injectable 4 milliGRAM(s) IV Push every 6 hours PRN Nausea and/or Vomiting  oxyCODONE    IR 2.5 milliGRAM(s) Oral every 4 hours PRN Moderate Pain (4 - 6)  oxyCODONE    IR 5 milliGRAM(s) Oral every 6 hours PRN Severe Pain (7 - 10)      Vital Signs Last 24 Hrs  T(C): 36.7 (24 Oct 2022 12:56), Max: 36.9 (24 Oct 2022 05:23)  T(F): 98.1 (24 Oct 2022 12:56), Max: 98.5 (24 Oct 2022 05:23)  HR: 97 (24 Oct 2022 12:56) (85 - 97)  BP: 105/63 (24 Oct 2022 12:56) (105/63 - 134/69)  BP(mean): --  RR: 18 (24 Oct 2022 12:56) (16 - 18)  SpO2: 95% (24 Oct 2022 12:56) (85% - 99%)    Parameters below as of 24 Oct 2022 12:56  Patient On (Oxygen Delivery Method): nasal cannula      COVID-19 PCR: NotDetec (15 Oct 2022 12:51)    LABS: Reviewed                          9.1    6.89  )-----------( 208      ( 24 Oct 2022 05:30 )             29.3     10-24    141  |  102  |  8   ----------------------------<  85  4.1   |  34<H>  |  0.39<L>    Ca    8.8      24 Oct 2022 05:30  Phos  2.5     10-24  Mg     1.9     10-24    COVID-19 PCR: NotDetec (15 Oct 2022 12:51)    Radiology: Reviewed  ACC: [id number] EXAM:  XR PELVIS AP ONLY 1-2 VIEWS                          PROCEDURE DATE:  10/15/2022      INTERPRETATION:  History: Fall.    FINDINGS: Frontal pelvis.    COMPARISON: 5/27/2012.    Patient has history of right hip arthroplasty. Dislocation of the femoral   component. No periprosthetic fracture appreciated.    Degenerative change visualized lower lumbar spine.    Calcification overlying the pelvis may represent fibroid calcification.    IMPRESSION:    Dislocation femoral component of right hip prosthesis.    --- End of Report ---      LIDIA JUAREZ MD; Attending Radiologist  This document has been electronically signed. Oct 16 2022  1:57PM    ORT Score -   Family Hx of substance abuse	Female	      Male  Alcohol 	                                           1                     3  Illegal drugs	                                   2                     3  Rx drugs                                           4 	                  4  Personal Hx of substance abuse		  Alcohol 	                                          3	                  3  Illegal drugs                                     4	                  4  Rx drugs                                            5 	                  5  Age between 16- 45 years	           1                     1  hx preadolescent sexual abuse	   3 	                  0  Psychological disease		  ADD, OCD, bipolar, schizophrenia   2	          2  Depression                                           1 	          1  Total: 1    a score of 3 or lower indicates low risk for opioid abuse		  a score of 4-7 indicates moderate risk for opioid abuse		  a score of 8 or higher indicates high risk for opioid abuse    REVIEW OF SYSTEMS:  CONSTITUTIONAL: No fever or fatigue  HEENT:  No difficulty hearing, no change in vision  NECK: No pain or stiffness  RESPIRATORY: No cough, wheezing, chills or hemoptysis; No shortness of breath  CARDIOVASCULAR: No chest pain, palpitations, dizziness, or leg swelling  GASTROINTESTINAL: No loss of appetite, decreased PO intake. No abdominal or epigastric pain. No nausea, vomiting; No diarrhea or constipation.   GENITOURINARY: No dysuria, frequency, hematuria, retention or incontinence  MUSCULOSKELETAL: + right hip pain, sacral back pain; no upper or lower motor strength weakness, no saddle anesthesia, bowel/bladder incontinence, + falls   NEURO: No headaches, No numbness/tingling b/l LE, No weakness  PSYCHIATRIC: + hx of depression, no anxiety or difficulty sleeping    PHYSICAL EXAM:  GENERAL:  Alert & Oriented X2 reoriented to time, able to answer questions, Speech is clear.   RESPIRATORY: Respirations even and unlabored. Clear to auscultation bilaterally; No rales, rhonchi, wheezing, or rubs. On 2 L NC  CARDIOVASCULAR: Normal S1/S2, regular rate and rhythm; No murmurs, rubs, or gallops. No JVD.   GASTROINTESTINAL:  Soft, Nontender, Nondistended; Bowel sounds present.  PERIPHERAL VASCULAR:  Extremities warm without edema. 2+ Peripheral Pulses, No cyanosis, No calf tenderness  MUSCULOSKELETAL: Motor Strength 5/5 B/L upper and 3/5 lower extremities; moves all extremities equally against gravity; ROM decreased RLE; +tenderness on palpation of R hip.   SKIN: Warm, dry, intact. No rashes or lesions. +R hip dressing in place c/d/i, ecchymosis on b/l hands and legs.     Risk factors associated with adverse outcomes related to opioid treatment  [ ]  Concurrent benzodiazepine use  [ ]  History/ Active substance use or alcohol use disorder  [ x) Psychiatric co-morbidity  [ ] Sleep apnea  [ ] COPD  [ ] BMI> 35  [ ] Liver dysfunction  [ ] Renal dysfunction  [ ] CHF  [ ] Smoker  [x]  Age > 60 years    [x]  [place]  Reviewed and Copied to Chart. See below.    Plan of care and goal oriented pain management treatment options were discussed with patient and /or primary care giver; all questions and concerns were addressed and care was aligned with patient's wishes.    Educated patient on goal oriented pain management treatment options     10-24-22 @ 16:10

## 2022-10-24 NOTE — DISCHARGE NOTE PROVIDER - CARE PROVIDER_API CALL
Marquise Osborn)  Internal Medicine; Pulmonary Disease  [address] [place] [address]  Phone: [phone]  Fax: [phone]  Follow Up Time: 2 weeks    FACILITY MD AT REHABILITATION,   Phone: (   )    -  Fax: (   )    -  Follow Up Time: 1-3 days    Castro Geronimo)  Orthopaedic Surgery; Sports Medicine  [address] [place] [address], [address] [place] [address]  Phone: [phone]  Fax: [phone]  Follow Up Time: 2 weeks

## 2022-10-24 NOTE — DISCHARGE NOTE PROVIDER - PROVIDER TOKENS
PROVIDER:[TOKEN:[[id number]],FOLLOWUP:[2 weeks]],FREE:[LAST:[FACILITY MD AT REHABILITATION],PHONE:[(   )    -],FAX:[(   )    -],FOLLOWUP:[1-3 days]],PROVIDER:[TOKEN:[[id number]],FOLLOWUP:[2 weeks]]

## 2022-10-24 NOTE — PROGRESS NOTE ADULT - PROBLEM SELECTOR PLAN 1
postop day 1 of hip reduction  dressing changes per ortho  PT recommending MARTHA  continue pain management  tylenol 1 gram Q8H  oxycodone 2.5/5mg prn mod/severe pain, BM control

## 2022-10-24 NOTE — PROGRESS NOTE ADULT - PROBLEM SELECTOR PLAN 3
O2 as needed  continue albuterol and ipratropium  continue CCB  outpatient follow up with pulmonology

## 2022-10-24 NOTE — PROGRESS NOTE ADULT - PROBLEM SELECTOR PLAN 2
ESBL E.coli bacteriuria  afebrile   leukocytosis resolved  on ertapenem til 10/27  ID Dr. Mullen  TOV today

## 2022-10-24 NOTE — PROGRESS NOTE ADULT - ASSESSMENT
Confidential Drug Utilization Report  Search Terms: Maria M Hopson, 11/29/1926Search Date: 10/20/2022 09:35:36 AM  The Drug Utilization Report below displays all of the controlled substance prescriptions, if any, that your patient has filled in the last twelve months. The information displayed on this report is compiled from pharmacy submissions to the Department, and accurately reflects the information as submitted by the pharmacies.    This report was requested by: Prachi Zayas | Reference #: [id number]    There are no results for the search terms that you entered.

## 2022-10-24 NOTE — DISCHARGE NOTE PROVIDER - NSDCCPCAREPLAN_GEN_ALL_CORE_FT
PRINCIPAL DISCHARGE DIAGNOSIS  Diagnosis: Closed fracture dislocation of hip joint  Assessment and Plan of Treatment: You wree admitted for dislocation right hip after fall. CT head resulted  tiny indeterminate lacunar infarcts, CT spine multiple degenerative changes.  Xray hip: dislocation right hip    You were followed by orthopedic team and underwent an open reduction of R total hip arthroplasty dislocation. Tolerated procedure well. You were evaluated by pain management specialist and PT who recommends rehabilitation. continue PT at nursing facility. continue pain medications. Follow up with facility MD and orthopedic doctor after discharge.      SECONDARY DISCHARGE DIAGNOSES  Diagnosis: Urinary tract infection due to extended-spectrum beta lactamase (ESBL) producing Escherichia coli  Assessment and Plan of Treatment: You were treated with IV antibiotic for ESBL Ecoli urinary tract infection. Followed by infectious disease team.   Drink enough water and fluids to keep your urine clear or pale yellow.  Avoid caffeine, tea, and carbonated beverages. They tend to irritate your bladder.  Empty your bladder often. Avoid holding urine for long periods of time.  After a bowel movement, women should cleanse from front to back. Use each tissue only once.  SEEK IMMEDIATE MEDICAL CARE IF:  You have severe back pain or lower abdominal pain.  You develop chills.  You have nausea or vomiting.  You have continued burning or discomfort with urination.      Diagnosis: Pulmonary hypertension  Assessment and Plan of Treatment: continue medications as prescribed. Use supplemental oxygen as needed for shortness of breath, or hypoxia.   Follow up with pulmonary doctor after discharge.    Diagnosis: Arthritis  Assessment and Plan of Treatment: continue pain management as prescribed. continue physical therapy at nursing [facility]. Actitity as tolearted. Follow up with facility MD and/or your Primary MD.    Diagnosis: Dementia  Assessment and Plan of Treatment: Dementia care: Create a safe environment.  Falls precautions, free from clutter, remove throw rugs. Insure adequate lighting. Install grab rails as needed. Obtain life line, use baby monitors. Provide 24hr /7 day per week supervision Reduce confusion. Keep familiar objects and people around. Use night lights or dim lights at night.  Keep a simple, consistent routine for waking, meals, bathing, dressing, and bedtime. Create a calm, quiet environment. Place large clocks and calendars prominently.  Limit caffeine. Attend social events that stimulate rather than overwhelm the senses. Encourage good nutrition and hydration. Reduce distractions during meal times and snacks. Monitor chewing and swallowing ability. Continue with routine vision, hearing, dental, and medical screenings. All medications per MD only. If change in behavior or patient becomes more confused or letharic seek medical attention.     PRINCIPAL DISCHARGE DIAGNOSIS  Diagnosis: Closed fracture dislocation of hip joint  Assessment and Plan of Treatment: You wree admitted for dislocation right hip after fall. CT head resulted  tiny indeterminate lacunar infarcts, CT spine multiple degenerative changes.  Xray hip: dislocation right hip    You were followed by orthopedic team and underwent an open reduction of R total hip arthroplasty dislocation. Tolerated procedure well. You were evaluated by pain management specialist and PT who recommends rehabilitation. continue PT at nursing facility. continue pain medications. Follow up with facility MD and orthopedic doctor after discharge.      SECONDARY DISCHARGE DIAGNOSES  Diagnosis: Pulmonary hypertension  Assessment and Plan of Treatment: continue medications as prescribed. Use supplemental oxygen as needed for shortness of breath, or hypoxia.   Follow up with pulmonary doctor after discharge.    Diagnosis: Arthritis  Assessment and Plan of Treatment: continue pain management as prescribed. continue physical therapy at nursing [facility]. Actitity as tolearted. Follow up with facility MD and/or your Primary MD.    Diagnosis: Urinary tract infection due to extended-spectrum beta lactamase (ESBL) producing Escherichia coli  Assessment and Plan of Treatment: You were treated with IV antibiotic for ESBL Ecoli urinary tract infection. Followed by infectious disease team.   Drink enough water and fluids to keep your urine clear or pale yellow.  Avoid caffeine, tea, and carbonated beverages. They tend to irritate your bladder.  Empty your bladder often. Avoid holding urine for long periods of time.  After a bowel movement, women should cleanse from front to back. Use each tissue only once.  SEEK IMMEDIATE MEDICAL CARE IF:  You have severe back pain or lower abdominal pain.  You develop chills.  You have nausea or vomiting.  You have continued burning or discomfort with urination.      Diagnosis: Dementia  Assessment and Plan of Treatment: Dementia care: Create a safe environment.  Falls precautions, free from clutter, remove throw rugs. Insure adequate lighting. Install grab rails as needed. Obtain life line, use baby monitors. Provide 24hr /7 day per week supervision Reduce confusion. Keep familiar objects and people around. Use night lights or dim lights at night.  Keep a simple, consistent routine for waking, meals, bathing, dressing, and bedtime. Create a calm, quiet environment. Place large clocks and calendars prominently.  Limit caffeine. Attend social events that stimulate rather than overwhelm the senses. Encourage good nutrition and hydration. Reduce distractions during meal times and snacks. Monitor chewing and swallowing ability. Continue with routine vision, hearing, dental, and medical screenings. All medications per MD only. If change in behavior or patient becomes more confused or letharic seek medical attention.

## 2022-10-24 NOTE — PROGRESS NOTE ADULT - SUBJECTIVE AND OBJECTIVE BOX
CHIEF COMPLAINT:Patient is a 89y old  Female who presents with a chief complaint of Dislocated right hip.Pt appears comfortable.    	  REVIEW OF SYSTEMS:  CONSTITUTIONAL: No fever, weight loss, or fatigue  EYES: No eye pain, visual disturbances, or discharge  ENT:  No difficulty hearing, tinnitus, vertigo; No sinus or throat pain  NECK: No pain or stiffness  RESPIRATORY: No cough, wheezing, chills or hemoptysis; No Shortness of Breath  CARDIOVASCULAR: No chest pain, palpitations, passing out, dizziness, or leg swelling  GASTROINTESTINAL: No abdominal or epigastric pain. No nausea, vomiting, or hematemesis; No diarrhea or constipation. No melena or hematochezia.  GENITOURINARY: No dysuria, frequency, hematuria, or incontinence  NEUROLOGICAL: No headaches, memory loss, loss of strength, numbness, or tremors  SKIN: No itching, burning, rashes, or lesions   LYMPH Nodes: No enlarged glands  ENDOCRINE: No heat or cold intolerance; No hair loss  MUSCULOSKELETAL: No joint pain or swelling; No muscle, back, or extremity pain  PSYCHIATRIC: No depression, anxiety, mood swings, or difficulty sleeping  HEME/LYMPH: No easy bruising, or bleeding gums  ALLERGY AND IMMUNOLOGIC: No hives or eczema	        PHYSICAL EXAM:  T(C): 36.9 (10-24-22 @ 05:23), Max: 36.9 (10-24-22 @ 05:23)  HR: 85 (10-24-22 @ 05:23) (85 - 100)  BP: 133/49 (10-24-22 @ 05:23) (115/69 - 133/49)  RR: 17 (10-24-22 @ 05:23) (16 - 18)  SpO2: 85% (10-24-22 @ 11:14) (85% - 100%)  Wt(kg): --  I&O's Summary    23 Oct 2022 07:01  -  24 Oct 2022 07:00  --------------------------------------------------------  IN: 30 mL / OUT: 1550 mL / NET: -1520 mL        Appearance: Normal	  HEENT:   Normal oral mucosa, PERRL, EOMI	  Lymphatic: No lymphadenopathy  Cardiovascular: Normal S1 S2, No JVD, No murmurs, No edema  Respiratory: Lungs clear to auscultation	  Psychiatry: A & O x 3, Mood & affect appropriate  Gastrointestinal:  Soft, Non-tender, + BS	  Skin: No rashes, No ecchymoses, No cyanosis	  Neurologic: Non-focal  Extremities: Normal range of motion, No clubbing, cyanosis or edema  Vascular: Peripheral pulses palpable 2+ bilaterally    MEDICATIONS  (STANDING):  ALBUTerol    90 MICROgram(s) HFA Inhaler      albuterol/ipratropium for Nebulization 3 milliLiter(s) Nebulizer every 6 hours  amLODIPine   Tablet 2.5 milliGRAM(s) Oral daily  enoxaparin Injectable 30 milliGRAM(s) SubCutaneous every 12 hours  ertapenem  IVPB 1000 milliGRAM(s) IV Intermittent every 24 hours  lidocaine   4% Patch 1 Patch Transdermal every 24 hours  multivitamin 1 Tablet(s) Oral daily  pantoprazole    Tablet 40 milliGRAM(s) Oral before breakfast  polyethylene glycol 3350 17 Gram(s) Oral at bedtime  senna 2 Tablet(s) Oral at bedtime  sodium chloride 0.9%. 1000 milliLiter(s) (50 mL/Hr) IV Continuous <Continuous>        LABS:	 	                      9.1    6.89  )-----------( 208      ( 24 Oct 2022 05:30 )             29.3     10-24    141  |  102  |  8   ----------------------------<  85  4.1   |  34<H>  |  0.39<L>    Ca    8.8      24 Oct 2022 05:30  Phos  2.5     10-24  Mg     1.9     10-24        Lipid Profile: Cholesterol 144  HDL 50  TG 69  Ldl calc 80    TSH: Thyroid Stimulating Hormone, Serum: 2.66 uU/mL (10-16 @ 05:30)

## 2022-10-24 NOTE — DISCHARGE NOTE PROVIDER - CARE PROVIDERS DIRECT ADDRESSES
,DirectAddress_Unknown,DirectAddress_Unknown,[email or web address]

## 2022-10-24 NOTE — PROGRESS NOTE ADULT - ASSESSMENT
89yo F, PMH: depression, scoliosis, dementia and arthritis presented to the ED s/p fall and was admitted to [facility], underwent a reduction of the R hip, POD #4, and was found to have ESBL E.coli bacteriuria, currently on ertapenem and ID following.   TOV today.

## 2022-10-24 NOTE — PROGRESS NOTE ADULT - ASSESSMENT
89 year old female, from home, hard of hearing, lives alone,  with PMHx of arthritis, Depression, scoliosis, dementia presents after a fall. Patient states that she slipped and fell landing on her right side with hip dislocation,UTI-ESBL.  1.PT.  2.Ortho f/u.  3.UTI-Abx to complete 5 days-d/w .  4.Pain control.  5.GI and DVT prophylaxis.

## 2022-10-24 NOTE — DISCHARGE NOTE PROVIDER - NSDCMRMEDTOKEN_GEN_ALL_CORE_FT
Combigan 0.2%-0.5% ophthalmic solution: 1 drop(s) to each affected eye every 12 hours  furosemide 20 mg oral tablet: 1 tab(s) orally once a day  Lumigan 0.01% ophthalmic solution: 1 drop(s) to each affected eye once a day (in the evening)  metoprolol succinate 25 mg oral tablet, extended release: 1 tab(s) orally once a day  Travatan 0.004% ophthalmic solution: 1 drop(s) to each affected eye once a day (in the evening)  Zoloft 100 mg oral tablet: 1 tab(s) orally once a day   Combigan 0.2%-0.5% ophthalmic solution: 1 drop(s) to each affected eye every 12 hours  furosemide 20 mg oral tablet: 1 tab(s) orally once a day  Lumigan 0.01% ophthalmic solution: 1 drop(s) to each affected eye once a day (in the evening)  magnesium hydroxide 8% oral suspension: 30 milliliter(s) orally once a day, As needed, Constipation  metoprolol succinate 25 mg oral tablet, extended release: 1 tab(s) orally once a day  Multiple Vitamins oral tablet: 1 tab(s) orally once a day  pantoprazole 40 mg oral delayed release tablet: 1 tab(s) orally once a day (before a meal)  senna leaf extract oral tablet: 2 tab(s) orally once a day (at bedtime)  Travatan 0.004% ophthalmic solution: 1 drop(s) to each affected eye once a day (in the evening)  Zoloft 100 mg oral tablet: 1 tab(s) orally once a day

## 2022-10-24 NOTE — PROGRESS NOTE ADULT - SUBJECTIVE AND OBJECTIVE BOX
Patient is a 89y old  Female who presents with a chief complaint of right hip dislocation (24 Oct 2022 09:28)  Awake, alert, comfortable in bed in NAD    INTERVAL HPI/OVERNIGHT EVENTS:      VITAL SIGNS:  T(F): 98.5 (10-24-22 @ 05:23)  HR: 85 (10-24-22 @ 05:23)  BP: 133/49 (10-24-22 @ 05:23)  RR: 17 (10-24-22 @ 05:23)  SpO2: 99% (10-24-22 @ 05:23)  Wt(kg): --  I&O's Detail    23 Oct 2022 07:01  -  24 Oct 2022 07:00  --------------------------------------------------------  IN:    Oral Fluid: 30 mL  Total IN: 30 mL    OUT:    Indwelling Catheter - Urethral (mL): 1550 mL  Total OUT: 1550 mL    Total NET: -1520 mL              REVIEW OF SYSTEMS:    CONSTITUTIONAL:  No fevers, chills, sweats    HEENT:  Eyes:  No diplopia or blurred vision. ENT:  No earache, sore throat or runny nose.    CARDIOVASCULAR:  No pressure, squeezing, tightness, or heaviness about the chest; no palpitations.    RESPIRATORY:  Per HPI    GASTROINTESTINAL:  No abdominal pain, nausea, vomiting or diarrhea.    GENITOURINARY:  No dysuria, frequency or urgency.    NEUROLOGIC:  No paresthesias, fasciculations, seizures or weakness.    PSYCHIATRIC:  No disorder of thought or mood.      PHYSICAL EXAM:    Constitutional: Well developed and nourished  Eyes:Perrla  ENMT: normal  Neck:supple  Respiratory: good air entry  Cardiovascular: S1 S2 regular  Gastrointestinal: Soft, Non tender  Extremities: No edema  Vascular:normal  Neurological:Awake, alert,Ox3  Musculoskeletal:Normal      MEDICATIONS  (STANDING):  ALBUTerol    90 MICROgram(s) HFA Inhaler      albuterol/ipratropium for Nebulization 3 milliLiter(s) Nebulizer every 6 hours  amLODIPine   Tablet 2.5 milliGRAM(s) Oral daily  enoxaparin Injectable 30 milliGRAM(s) SubCutaneous every 12 hours  ertapenem  IVPB 1000 milliGRAM(s) IV Intermittent every 24 hours  lidocaine   4% Patch 1 Patch Transdermal every 24 hours  multivitamin 1 Tablet(s) Oral daily  pantoprazole    Tablet 40 milliGRAM(s) Oral before breakfast  polyethylene glycol 3350 17 Gram(s) Oral at bedtime  senna 2 Tablet(s) Oral at bedtime  sodium chloride 0.9%. 1000 milliLiter(s) (50 mL/Hr) IV Continuous <Continuous>    MEDICATIONS  (PRN):  magnesium hydroxide Suspension 30 milliLiter(s) Oral daily PRN Constipation  ondansetron Injectable 4 milliGRAM(s) IV Push every 6 hours PRN Nausea and/or Vomiting  oxyCODONE    IR 2.5 milliGRAM(s) Oral every 4 hours PRN Moderate Pain (4 - 6)  oxyCODONE    IR 5 milliGRAM(s) Oral every 6 hours PRN Severe Pain (7 - 10)      Allergies    No Known Allergies    Intolerances        LABS:                        9.1    6.89  )-----------( 208      ( 24 Oct 2022 05:30 )             29.3     10-24    141  |  102  |  8   ----------------------------<  85  4.1   |  34<H>  |  0.39<L>    Ca    8.8      24 Oct 2022 05:30  Phos  2.5     10-24  Mg     1.9     10-24                CAPILLARY BLOOD GLUCOSE            RADIOLOGY & ADDITIONAL TESTS:    CXR:    Ct scan chest:    ekg;    echo:

## 2022-10-24 NOTE — PROGRESS NOTE ADULT - SUBJECTIVE AND OBJECTIVE BOX
NP Note discussed with  Primary Attending    INTERVAL HPI/OVERNIGHT EVENTS: no new complaints    MEDICATIONS  (STANDING):  ALBUTerol    90 MICROgram(s) HFA Inhaler      albuterol/ipratropium for Nebulization 3 milliLiter(s) Nebulizer every 6 hours  amLODIPine   Tablet 2.5 milliGRAM(s) Oral daily  enoxaparin Injectable 30 milliGRAM(s) SubCutaneous every 12 hours  ertapenem  IVPB 1000 milliGRAM(s) IV Intermittent every 24 hours  lidocaine   4% Patch 1 Patch Transdermal every 24 hours  multivitamin 1 Tablet(s) Oral daily  pantoprazole    Tablet 40 milliGRAM(s) Oral before breakfast  polyethylene glycol 3350 17 Gram(s) Oral at bedtime  senna 2 Tablet(s) Oral at bedtime  sodium chloride 0.9%. 1000 milliLiter(s) (50 mL/Hr) IV Continuous <Continuous>    MEDICATIONS  (PRN):  magnesium hydroxide Suspension 30 milliLiter(s) Oral daily PRN Constipation  ondansetron Injectable 4 milliGRAM(s) IV Push every 6 hours PRN Nausea and/or Vomiting  oxyCODONE    IR 2.5 milliGRAM(s) Oral every 4 hours PRN Moderate Pain (4 - 6)  oxyCODONE    IR 5 milliGRAM(s) Oral every 6 hours PRN Severe Pain (7 - 10)      __________________________________________________  REVIEW OF SYSTEMS:    CONSTITUTIONAL: No fever,   EYES: no acute visual disturbances  NECK: No pain or stiffness  RESPIRATORY: No cough; No shortness of breath  CARDIOVASCULAR: No chest pain, no palpitations  GASTROINTESTINAL: No pain. No nausea or vomiting; No diarrhea   NEUROLOGICAL: No headache or numbness, no tremors  MUSCULOSKELETAL: mild pain right hip  GENITOURINARY: no dysuria, no frequency, no hesitancy  PSYCHIATRY: no depression , no anxiety  ALL OTHER  ROS negative        Vital Signs Last 24 Hrs  T(C): 36.9 (24 Oct 2022 05:23), Max: 36.9 (24 Oct 2022 05:23)  T(F): 98.5 (24 Oct 2022 05:23), Max: 98.5 (24 Oct 2022 05:23)  HR: 85 (24 Oct 2022 05:23) (85 - 100)  BP: 133/49 (24 Oct 2022 05:23) (115/69 - 133/49)  BP(mean): --  RR: 17 (24 Oct 2022 05:23) (16 - 18)  SpO2: 99% (24 Oct 2022 05:23) (97% - 100%)    Parameters below as of 24 Oct 2022 05:23  Patient On (Oxygen Delivery Method): nasal cannula  O2 Flow (L/min): 1      ________________________________________________  PHYSICAL EXAM:  GENERAL: NAD, hard of hearing  HEENT: Normocephalic;  conjunctivae and sclerae clear; moist mucous membranes;   NECK : supple  CHEST/LUNG: Clear to auscultation bilaterally with fair air entry Eupneic  HEART: S1 S2  regular; no murmurs, gallops or rubs  ABDOMEN: Soft, Nontender, Nondistended; Bowel sounds present  EXTREMITIES: no cyanosis; no edema; no calf tenderness  Musk: limited ROM right leg  : Mcgill in place, slight pink  SKIN: warm and dry; no rash  NERVOUS SYSTEM:  Awake and alert; Oriented  to self and place ; no new deficits    _________________________________________________  LABS:                        9.1    6.89  )-----------( 208      ( 24 Oct 2022 05:30 )             29.3     10-24    141  |  102  |  8   ----------------------------<  85  4.1   |  34<H>  |  0.39<L>    Ca    8.8      24 Oct 2022 05:30  Phos  2.5     10-24  Mg     1.9     10-24          CAPILLARY BLOOD GLUCOSE    RADIOLOGY & ADDITIONAL TESTS:    Imaging  Reviewed:  YES    < from: Xray Pelvis AP only (10.15.22 @ 11:33) >  IMPRESSION:    Dislocation femoral component of right hip prosthesis.    < end of copied text >    Consultant(s) Notes Reviewed:   YES      Plan of care was discussed with patient and /or primary care giver; all questions and concerns were addressed

## 2022-10-24 NOTE — PROGRESS NOTE ADULT - SUBJECTIVE AND OBJECTIVE BOX
Patient is a 89y old  Female who presents with a chief complaint of S/p open R DOMONIQUE reduction on 10/20/2022 (24 Oct 2022 08:32)    pt seen in icu [  ], reg med floor [   ], bed [  ], chair at bedside [   ], a+o x3 [  ], lethargic [  ],  nad [  ]    billingsley [  ], ngt [  ], peg [  ], et tube [  ], cent line [  ], picc line [  ]        Allergies    No Known Allergies        Vitals    T(F): 98.5 (10-24-22 @ 05:23), Max: 98.5 (10-24-22 @ 05:23)  HR: 85 (10-24-22 @ 05:23) (85 - 100)  BP: 133/49 (10-24-22 @ 05:23) (115/69 - 133/49)  RR: 17 (10-24-22 @ 05:23) (16 - 18)  SpO2: 99% (10-24-22 @ 05:23) (97% - 100%)  Wt(kg): --  CAPILLARY BLOOD GLUCOSE          Labs                          9.1    6.89  )-----------( 208      ( 24 Oct 2022 05:30 )             29.3       10-24    141  |  102  |  8   ----------------------------<  85  4.1   |  34<H>  |  0.39<L>    Ca    8.8      24 Oct 2022 05:30  Phos  2.5     10-24  Mg     1.9     10-24              Clean Catch Clean Catch (Midstream)  10-16 @ 01:53   >100,000 CFU/ml Escherichia coli  >100,000 CFU/ml Escherichia coli ESBL  --  Escherichia coli ESBL  Escherichia coli          Radiology Results      Meds    MEDICATIONS  (STANDING):  ALBUTerol    90 MICROgram(s) HFA Inhaler      albuterol/ipratropium for Nebulization 3 milliLiter(s) Nebulizer every 6 hours  amLODIPine   Tablet 2.5 milliGRAM(s) Oral daily  enoxaparin Injectable 30 milliGRAM(s) SubCutaneous every 12 hours  ertapenem  IVPB 1000 milliGRAM(s) IV Intermittent every 24 hours  lidocaine   4% Patch 1 Patch Transdermal every 24 hours  multivitamin 1 Tablet(s) Oral daily  pantoprazole    Tablet 40 milliGRAM(s) Oral before breakfast  polyethylene glycol 3350 17 Gram(s) Oral at bedtime  senna 2 Tablet(s) Oral at bedtime  sodium chloride 0.9%. 1000 milliLiter(s) (50 mL/Hr) IV Continuous <Continuous>      MEDICATIONS  (PRN):  magnesium hydroxide Suspension 30 milliLiter(s) Oral daily PRN Constipation  ondansetron Injectable 4 milliGRAM(s) IV Push every 6 hours PRN Nausea and/or Vomiting  oxyCODONE    IR 2.5 milliGRAM(s) Oral every 4 hours PRN Moderate Pain (4 - 6)  oxyCODONE    IR 5 milliGRAM(s) Oral every 6 hours PRN Severe Pain (7 - 10)      Physical Exam    Neuro :  no focal deficits  Respiratory: CTA B/L  CV: RRR, S1S2, no murmurs,   Abdominal: Soft, NT, ND +BS,  Extremities: No edema, + peripheral pulses    ASSESSMENT    Fracture of unspecified part of neck of unspecified femur, initial encounter for closed fracture    Arthritis    No significant past surgical history        PLAN     Patient is a 89y old  Female who presents with a chief complaint of S/p open R DOMONIQUE reduction on 10/20/2022 (24 Oct 2022 08:32)    pt seen in ed [  ], reg surg floor [ x  ], bed [ x ], chair at bedside [   ], awake and responsive [ x ], lethargic [  ],    nad [ x ]      Allergies    No Known Allergies        Vitals    T(F): 98.5 (10-24-22 @ 05:23), Max: 98.5 (10-24-22 @ 05:23)  HR: 85 (10-24-22 @ 05:23) (85 - 100)  BP: 133/49 (10-24-22 @ 05:23) (115/69 - 133/49)  RR: 17 (10-24-22 @ 05:23) (16 - 18)  SpO2: 99% (10-24-22 @ 05:23) (97% - 100%)  Wt(kg): --  CAPILLARY BLOOD GLUCOSE          Labs                          9.1    6.89  )-----------( 208      ( 24 Oct 2022 05:30 )             29.3       10-24    141  |  102  |  8   ----------------------------<  85  4.1   |  34<H>  |  0.39<L>    Ca    8.8      24 Oct 2022 05:30  Phos  2.5     10-24  Mg     1.9     10-24      Vitamin B12, Serum (10.23.22 @ 09:55)   Vitamin B12, Serum: >2000 pg/mL    Folate, Serum (10.23.22 @ 09:55)   Folate, Serum: >20.0 ng/mL         Clean Catch Clean Catch (Midstream)  10-16 @ 01:53   >100,000 CFU/ml Escherichia coli  >100,000 CFU/ml Escherichia coli ESBL  --  Escherichia coli ESBL  Escherichia coli          Radiology Results      Meds    MEDICATIONS  (STANDING):  ALBUTerol    90 MICROgram(s) HFA Inhaler      albuterol/ipratropium for Nebulization 3 milliLiter(s) Nebulizer every 6 hours  amLODIPine   Tablet 2.5 milliGRAM(s) Oral daily  enoxaparin Injectable 30 milliGRAM(s) SubCutaneous every 12 hours  ertapenem  IVPB 1000 milliGRAM(s) IV Intermittent every 24 hours  lidocaine   4% Patch 1 Patch Transdermal every 24 hours  multivitamin 1 Tablet(s) Oral daily  pantoprazole    Tablet 40 milliGRAM(s) Oral before breakfast  polyethylene glycol 3350 17 Gram(s) Oral at bedtime  senna 2 Tablet(s) Oral at bedtime  sodium chloride 0.9%. 1000 milliLiter(s) (50 mL/Hr) IV Continuous <Continuous>      MEDICATIONS  (PRN):  magnesium hydroxide Suspension 30 milliLiter(s) Oral daily PRN Constipation  ondansetron Injectable 4 milliGRAM(s) IV Push every 6 hours PRN Nausea and/or Vomiting  oxyCODONE    IR 2.5 milliGRAM(s) Oral every 4 hours PRN Moderate Pain (4 - 6)  oxyCODONE    IR 5 milliGRAM(s) Oral every 6 hours PRN Severe Pain (7 - 10)      Physical Exam    Neuro :  no focal deficits  Respiratory: CTA B/L  CV: RRR, S1S2, no murmurs,   Abdominal: Soft, NT, ND +BS,  Extremities: No edema, + peripheral pulses, right hip dressing cdi         ASSESSMENT    right posterior dislocation of DOMONIQUE  s/p fall,   uti   macrocytic anemia  h/o arthritis,   Depression,   scoliosis,   dementia         PLAN    Right hip open reduction of spontaneous dislocation, hip 20-Oct-2022    ortho f/u   Daily Physical Therapy:  WBAT on RLE with appropriate assistive device  Posterior hip precautions   gi and dvt prophylaxis  phys tx eval noted  and rec daily x 8 WKS at [facility]   cont lidocaine patch,   morphine prn   oxycodone prn   pain mgmt f/u   Opioid pain recommendations   - Continue Oxycodone 2.5 mg/5 mg PO q 4 hours PRN moderate/severe pain post-op. Monitor for sedation/ respiratory depression.   Non-opioid pain recommendations   - Acetaminophen 1 gram PO q 8 hours for 4 days post-op. Monitor LFTs  - Continue Lidocaine patches 4%  Bowel Regimen  - Continue Miralax 17G PO daily  - Continue Senna 2 tablets at bedtime for constipation  cardio f/u   echo with EF = 55 to 60%. Grade I diastolic dysfunction. RV systolic pressure is moderately increased noted    pulm f/u   Oxygen supp. prn  Duoneb nebulizer solution   Amlodipine 2.5 mg PO   incentive spirometer  ucx and sens with esbl e coli   id f/u   Continue Ertapenem to cover ESBL  E.coli in urine, until 10/27/22  vit b12 and folate wnl  cont current meds   d/c plan to wes

## 2022-10-24 NOTE — DISCHARGE NOTE PROVIDER - HOSPITAL COURSE
89 year old female, from home, hard of hearing, lives alone,  with PMHx of arthritis, Depression, scoliosis, dementia presents after a fall. Patient states that she slipped and fell landing on her right side. Patient denies any head trauma or LOC but endorses right hip pain. Denies any headaches, numbness, tingling, fever, chills, nausea,  vomiting chest pain, SOB, abdominal pain, or change in bowel habits.     In the ED:  Afebrile, hemodynamically stable   CTH: tiny indeterminate lacunar infarcts, CT spine multiple degenerative changes  Xray hip: dislocation right hip      Patient is admitted to [facility], Followed by orthopedic team. underwent an open reduction of R total hip arthroplasty dislocation. Patient is also found ESBL Ecoli UTI. Remains afebrile. Followed by ID. Treated with Ertapenem x 5 days (until 10/27). Leukocytosis is resolved. D/C'd billingsley catheter and voided freely.   PT evaluated and recommends [facility]. Pain management consulted.  Pt with hx pulmonary HTN, requires supplemental oxygen via NC. Pulmonology consulted. placed on bronchodilator and CCB.   Patient is clinically improving and medically optimized for discharge to [facility].   Please refer to medical records/progress/consult notes for in depth hospital course.       incomplete 10/24.    89 year old female, from home, hard of hearing, lives alone,  with PMHx of arthritis, Depression, scoliosis, dementia presents after a fall. Patient states that she slipped and fell landing on her right side. Patient denies any head trauma or LOC but endorses right hip pain. Denies any headaches, numbness, tingling, fever, chills, nausea,  vomiting chest pain, SOB, abdominal pain, or change in bowel habits.     Patient is admitted to [facility], Followed by orthopedic team. underwent an open reduction of R total hip arthroplasty dislocation. Patient is also found ESBL Ecoli UTI. Remains afebrile. Followed by ID. Treated with Ertapenem x 5 days (until 10/27). Leukocytosis is resolved. D/C'd billingsley catheter and voided freely.  However had an episode of hematuria, ******************************************Pending Renal US     PT evaluated and recommends [facility]. Pain management consulted.  Pt with hx pulmonary HTN, requires supplemental oxygen via NC. Pulmonology consulted. placed on bronchodilator and CCB.   Patient is clinically improving and medically optimized for discharge to [facility].   Please refer to medical records/progress/consult notes for in depth hospital course.       incomplete 10/24.    89 year old female, from home, hard of hearing, lives alone,  with PMHx of arthritis, Depression, scoliosis, dementia presents after a fall. Patient states that she slipped and fell landing on her right side. Patient denies any head trauma or LOC but endorses right hip pain. Denies any headaches, numbness, tingling, fever, chills, nausea,  vomiting chest pain, SOB, abdominal pain, or change in bowel habits.   Patient is admitted to [facility], Followed by orthopedic team. underwent an open reduction of R total hip arthroplasty dislocation. Patient is also found ESBL Ecoli UTI. Remains afebrile. Followed by ID. Treated with Ertapenem x 5 days (until 10/27). Leukocytosis is resolved. D/C'd billingsley catheter and voided freely.  However had an episode of hematuria. Renal US and bladder US negative for acute findings.     PT evaluated and recommends [facility]. Pain management consulted.  Pt with hx pulmonary HTN, requires supplemental oxygen via NC. Pulmonology consulted. placed on bronchodilator and CCB.     Hip precautions/abduction pillow placed      Patient is clinically improving and medically optimized for discharge to [facility].   Please refer to medical records/progress/consult notes for in depth hospital course.

## 2022-10-24 NOTE — PROGRESS NOTE ADULT - ASSESSMENT
Patient is a 89y old  Female, from home, hard of hearing, lives alone,  with PMHx of arthritis, Depression, scoliosis, dementia,  now presents to the ER on 10/15/22 for evaluation of a fall. Patient states that she slipped and fell landing on her right side. Patient denies any head trauma or LOC but endorses right hip pain. ON admission, she found to have no fever, but tachycardia., Leukocytosis and positive urine analysis. The Xray of pelvis shows Dislocation femoral component of right hip prosthesis. She underwent Open reduction of Right hip on 10/20/22. The ID consult requested today, 10/21/22, to assist with antibiotic treatment for ESBL E.coli UTI.    # Sepsis ( Tachycardia + Leukocytosis) on admission- resolved  # UTI - 10/16 and Urine Cx grew ESBL  E.coli  # S/p Fall-  Xray of pelvis shows Dislocation femoral component of right hip prosthesis.- s/p Open reduction on 10/20/22    would recommend:    1. PT/OOB to chair   2. Continue Ertapenem to cover ESBL  E.coli in urine, until 10/27/22 X 3 more days   3. Monitor kidney function and IVF  4. Pain management as needed    Attending Attestation:    Spent more than 35 minutes on total encounter, more than 50 % of the visit was spent counseling and/or coordinating care by the Attending physician.

## 2022-10-24 NOTE — DISCHARGE NOTE PROVIDER - NSDCQMCOGNITION_NEU_ALL_CORE
Spoke with patient. She verbalized understanding of results. Difficulty making decisions/Difficulty remembering

## 2022-10-24 NOTE — CHART NOTE - NSCHARTNOTEFT_GEN_A_CORE
D/C'd Mcgill this am and passed TOV.   Patient is noted O2 sat 85% on room air. placed oxygen 2L via NC, improving 96-98%. no cough, chest discomfort, remains afebrile. lung sounds clear b/l.  Encouraged OOB.   will follow up chest xray. D/C'd Mcgill this am and passed TOV.   Patient is noted O2 sat 85% on room air. placed oxygen 2L via NC, improving 96-98%. no cough, chest discomfort, remains afebrile. lung sounds clear b/l.    Pt hs hx pulm. HTN.    Encouraged OOB.   will follow up chest xray.

## 2022-10-24 NOTE — DISCHARGE NOTE PROVIDER - DETAILS OF MALNUTRITION DIAGNOSIS/DIAGNOSES
This patient has been assessed with a concern for Malnutrition and was treated during this hospitalization for the following Nutrition diagnosis/diagnoses:     -  10/22/2022: Severe protein-calorie malnutrition   -  10/22/2022: Underweight (BMI < 19)

## 2022-10-24 NOTE — PROGRESS NOTE ADULT - PROBLEM SELECTOR PLAN 1
Pt with acute right hip pain which is somatic in nature due to s/p Open reduction of right total hip dislocation on 10/20/2022. POD# 4. GOHCO. Pt also with acute sacral pain which is somatic due to positioning.    High risk medications reviewed. Avoid polypharmacy. Avoid IV opioids. Avoid NSAIDs and benzodiazepines. Non-pharmacological sleep aides initiated. Non-opioid medications and non-pharmacological pain management measures initiated.  Opioid pain recommendations   - Change Oxycodone 2.5 mg PO q 4 hours PRN severe pain. Monitor for sedation/ respiratory depression.   Non-opioid pain recommendations   - Acetaminophen 1 gram PO q 8 hours for 4 days completed, now 650mg PO q6h PRN moderate pain. Monitor LFTs  - Continue Lidocaine 4% patch to sacrum.   Bowel Regimen  - Continue Miralax 17G PO daily  - Continue Senna 2 tablets at bedtime for constipation  Mild pain   - Non-pharmacological pain treatment recommendations  - Warm/ Cool packs PRN   - Repositioning extremity, elevation, imagery, relaxation, distraction.  - Physical therapy OOB if no contraindications   Recommendations discussed with primary team and RN.

## 2022-10-24 NOTE — PROGRESS NOTE ADULT - SUBJECTIVE AND OBJECTIVE BOX
89yFemale    Diagnosis:  S/p open reduction of R Total Hip arthroplasty dislocation POD# 4    Patient was seen and evaluated at bedside. Patient with no acute complaints.   Pain is well controlled to the RLE. Denies any issues.    Denies CP/SOB, dyspnea, paresthesias, N/V/D, palpitations.     Vital Signs Last 24 Hrs  T(C): 36.9 (24 Oct 2022 05:23), Max: 36.9 (24 Oct 2022 05:23)  T(F): 98.5 (24 Oct 2022 05:23), Max: 98.5 (24 Oct 2022 05:23)  HR: 85 (24 Oct 2022 05:23) (85 - 100)  BP: 133/49 (24 Oct 2022 05:23) (115/69 - 133/49)  BP(mean): --  RR: 17 (24 Oct 2022 05:23) (16 - 18)  SpO2: 99% (24 Oct 2022 05:23) (97% - 100%)    Parameters below as of 24 Oct 2022 05:23  Patient On (Oxygen Delivery Method): nasal cannula  O2 Flow (L/min): 1    I&O's Detail    23 Oct 2022 07:01  -  24 Oct 2022 07:00  --------------------------------------------------------  IN:    Oral Fluid: 30 mL  Total IN: 30 mL    OUT:    Indwelling Catheter - Urethral (mL): 1550 mL  Total OUT: 1550 mL    Total NET: -1520 mL      Physical Exam:    General: NAD, resting comfortably in bed.    R Hip: No deformity, Dressing is C/D/I. Skin is pink and warm. Staples intact. No erythema. SILT.  Wound with no drainage, healing well.   Lower extremity: Abduction pillow not intact- it appears to be on the chair beside her, No calf tenderness, calves are soft. 2+pulses. NVI. (+)EHL/FHL/ADF/APF.  Good capillary refill. Warm, well-perfused.                           9.1    6.89  )-----------( 208      ( 24 Oct 2022 05:30 )             29.3     10-24    141  |  102  |  8   ----------------------------<  85  4.1   |  34<H>  |  0.39<L>    Ca    8.8      24 Oct 2022 05:30  Phos  2.5     10-24  Mg     1.9     10-24        Impression:  89 y/o F S/p open reduction of R Total Hip arthroplasty dislocation POD# 4  Plan:  -  Pain management  -  DVT prophylaxis  -  Daily Physical Therapy:  WBAT to RLE  -  Discharge planning: Rehab  -  Incentive Spirometer  -  Hip precautions/abduction pillow placed  -  Case d/w Dr. Geronimo and medical team

## 2022-10-24 NOTE — PROGRESS NOTE ADULT - SUBJECTIVE AND OBJECTIVE BOX
Patient is seen and examined at the bed side, is afebrile. She is s/p removal of billingsley catheter and passed the TOV.      REVIEW OF SYSTEMS: All other review systems are negative      ALLERGIES: No Known Allergies      Vital Signs Last 24 Hrs  T(C): 36.7 (24 Oct 2022 12:56), Max: 36.9 (24 Oct 2022 05:23)  T(F): 98.1 (24 Oct 2022 12:56), Max: 98.5 (24 Oct 2022 05:23)  HR: 97 (24 Oct 2022 12:56) (85 - 97)  BP: 105/63 (24 Oct 2022 12:56) (105/63 - 134/69)  BP(mean): --  RR: 18 (24 Oct 2022 12:56) (16 - 18)  SpO2: 95% (24 Oct 2022 12:56) (85% - 99%)    Parameters below as of 24 Oct 2022 12:56  Patient On (Oxygen Delivery Method): nasal cannula        PHYSICAL EXAM:  GENERAL: Not in distress   CHEST/LUNG:  Not using accessory muscles   HEART: s1 and s2 present  ABDOMEN:  Nontender and  Nondistended  EXTREMITIES: No pedal  edema  CNS: Awake and Alert      LABS:                        9.1    6.89  )-----------( 208      ( 24 Oct 2022 05:30 )             29.3                           9.8    7.73  )-----------( 193      ( 23 Oct 2022 06:28 )             31.3       10-24    141  |  102  |  8   ----------------------------<  85  4.1   |  34<H>  |  0.39<L>    Ca    8.8      24 Oct 2022 05:30  Phos  2.5     10-24  Mg     1.9     10-24      10-23    142  |  105  |  11  ----------------------------<  91  4.1   |  31  |  0.39<L>    Ca    8.5      23 Oct 2022 06:28  Phos  2.9     10-23  Mg     2.0     10-23      CAPILLARY BLOOD GLUCOSE  POCT Blood Glucose.: 87 mg/dL (20 Oct 2022 23:56)        MEDICATIONS  (STANDING):    ALBUTerol    90 MICROgram(s) HFA Inhaler      albuterol/ipratropium for Nebulization 3 milliLiter(s) Nebulizer every 6 hours  amLODIPine   Tablet 2.5 milliGRAM(s) Oral daily  enoxaparin Injectable 30 milliGRAM(s) SubCutaneous every 12 hours  ertapenem  IVPB 1000 milliGRAM(s) IV Intermittent every 24 hours  lidocaine   4% Patch 1 Patch Transdermal daily  multivitamin 1 Tablet(s) Oral daily  pantoprazole    Tablet 40 milliGRAM(s) Oral before breakfast  polyethylene glycol 3350 17 Gram(s) Oral at bedtime  senna 2 Tablet(s) Oral at bedtime  sodium chloride 0.9%. 1000 milliLiter(s) (50 mL/Hr) IV Continuous <Continuous>      RADIOLOGY & ADDITIONAL TESTS:    10/15/22 : CT Cervical Spine No Cont (10.15.22 @ 11:40) >CT HEAD: Mild periventricular white matter ischemia.Tiny indeterminate   lacunar infarction in the anterior limb of the LEFT internal capsule. Mild to moderate cerebellar and temporal lobe atrophy.    CT cervical spine:   No vertebral fracture is recognized.  Straightening with grade 1 anterior spondylolisthesis at C2-3, C3-4 and C7-T1 on a   degenerative basis.    Advanced degenerative disc disease and spondylosis at C3-4 C7-T1 with narrowing of the BILATERAL C2-3 through C6-7 neural   foramina due to uncovertebral spurring and facet osteophytic hypertrophy.   Degenerative cord impingement is seen at C4-5 through C6-7 due to  posterior osteophytic ridge/disc complexes.    10/15/22: CT Head No Cont (10.15.22 @ 11:39) >    CT HEAD: Mild periventricular white matter ischemia. Tiny indeterminate lacunar infarction in the anterior limb of the LEFT internal capsule.   Mild to moderate cerebellar and temporal lobe atrophy.      10/15/22: Xray Pelvis AP only (10.15.22 @ 11:33) >    Dislocation femoral component of right hip prosthesis.        MICROBIOLOGY DATA:    Culture - Urine (10.16.22 @ 01:53)   - Amikacin: S <=16   - Amikacin: S <=16   - Amoxicillin/Clavulanic Acid: S <=8/4   - Amoxicillin/Clavulanic Acid: S <=8/4 Consider reserving for cystitis when ampicillin/sulbactam is resistant   - Ampicillin: R >16 These ampicillin results predict results for amoxicillin   - Ampicillin: R >16 These ampicillin results predict results for amoxicillin   - Ampicillin/Sulbactam: S 8/4 Enterobacter, Klebsiella aerogenes, Citrobacter, and Serratia may develop resistance during prolonged therapy (3-4 days)   - Ampicillin/Sulbactam: R >16/8 Enterobacter, Klebsiella aerogenes, Citrobacter, and Serratia may develop resistance during prolonged therapy (3-4 days)   - Aztreonam: R >16   - Aztreonam: S <=4   - Cefazolin: S <=2 For uncomplicated UTI with K. pneumoniae, E. coli, or P. mirablis: BORIS <=16 is sensitive and BORIS >=32 is resistant. This also predicts results for oral agents cefaclor, cefdinir, cefpodoxime, cefprozil, cefuroxime axetil, cephalexin and locarbef for uncomplicated UTI. Note that some isolates may be susceptible to these agents while testing resistant to cefazolin.   - Cefazolin: R >16 For uncomplicated UTI with K. pneumoniae, E. coli, or P. mirablis: BORIS <=16 is sensitive and BORIS >=32 is resistant. This also predicts results for oral agents cefaclor, cefdinir, cefpodoxime, cefprozil, cefuroxime axetil, cephalexin and locarbef for uncomplicated UTI. Note that some isolates may be susceptible to these agents while testing resistant to cefazolin.   - Cefepime: R >16   - Cefepime: S <=2   - Cefoxitin: S <=8   - Ceftriaxone: S <=1 Enterobacter, Klebsiella aerogenes, Citrobacter, and Serratia may develop resistance during prolonged therapy   - Ceftriaxone: R >32 Enterobacter, Klebsiella aerogenes, Citrobacter, and Serratia may develop resistance during prolonged therapy   - Ciprofloxacin: R >2   - Ciprofloxacin: S <=0.25   - Ertapenem: S <=0.5   - Ertapenem: S <=0.5   - Gentamicin: R >8   - Gentamicin: R >8   - Imipenem: S <=1   - Imipenem: S <=1   - Levofloxacin: R >4   - Levofloxacin: S <=0.5   - Meropenem: S <=1   - Meropenem: S <=1   - Nitrofurantoin: S <=32 Should not be used to treat pyelonephritis   - Nitrofurantoin: S <=32 Should not be used to treat pyelonephritis   - Piperacillin/Tazobactam: S <=8   - Piperacillin/Tazobactam: S 16   - Tigecycline: S <=2   - Tigecycline: S <=2   - Tobramycin: I 8   - Tobramycin: I 8   - Trimethoprim/Sulfamethoxazole: S <=0.5/9.5   - Trimethoprim/Sulfamethoxazole: R >2/38   Specimen Source: Clean Catch Clean Catch (Midstream)   Culture Results:   >100,000 CFU/ml Escherichia coli   >100,000 CFU/ml Escherichia coli ESBL   Organism Identification: Escherichia coli ESBL   Escherichia coli   Organism: Escherichia coli ESBL   Organism: Escherichia coli

## 2022-10-25 DIAGNOSIS — R31.9 HEMATURIA, UNSPECIFIED: ICD-10-CM

## 2022-10-25 LAB
ALBUMIN SERPL ELPH-MCNC: 2.2 G/DL — LOW (ref 3.5–5)
ALP SERPL-CCNC: 83 U/L — SIGNIFICANT CHANGE UP (ref 40–120)
ALT FLD-CCNC: 21 U/L DA — SIGNIFICANT CHANGE UP (ref 10–60)
ANION GAP SERPL CALC-SCNC: 5 MMOL/L — SIGNIFICANT CHANGE UP (ref 5–17)
AST SERPL-CCNC: 27 U/L — SIGNIFICANT CHANGE UP (ref 10–40)
BASOPHILS # BLD AUTO: 0.04 K/UL — SIGNIFICANT CHANGE UP (ref 0–0.2)
BASOPHILS NFR BLD AUTO: 0.6 % — SIGNIFICANT CHANGE UP (ref 0–2)
BILIRUB SERPL-MCNC: 0.3 MG/DL — SIGNIFICANT CHANGE UP (ref 0.2–1.2)
BUN SERPL-MCNC: 11 MG/DL — SIGNIFICANT CHANGE UP (ref 7–18)
CALCIUM SERPL-MCNC: 9.2 MG/DL — SIGNIFICANT CHANGE UP (ref 8.4–10.5)
CHLORIDE SERPL-SCNC: 102 MMOL/L — SIGNIFICANT CHANGE UP (ref 96–108)
CO2 SERPL-SCNC: 35 MMOL/L — HIGH (ref 22–31)
CREAT SERPL-MCNC: 0.48 MG/DL — LOW (ref 0.5–1.3)
EGFR: 87 ML/MIN/1.73M2 — SIGNIFICANT CHANGE UP
EOSINOPHIL # BLD AUTO: 0.36 K/UL — SIGNIFICANT CHANGE UP (ref 0–0.5)
EOSINOPHIL NFR BLD AUTO: 5.6 % — SIGNIFICANT CHANGE UP (ref 0–6)
GLUCOSE SERPL-MCNC: 94 MG/DL — SIGNIFICANT CHANGE UP (ref 70–99)
HCT VFR BLD CALC: 30.7 % — LOW (ref 34.5–45)
HGB BLD-MCNC: 9.7 G/DL — LOW (ref 11.5–15.5)
IMM GRANULOCYTES NFR BLD AUTO: 0.3 % — SIGNIFICANT CHANGE UP (ref 0–0.9)
LYMPHOCYTES # BLD AUTO: 1.21 K/UL — SIGNIFICANT CHANGE UP (ref 1–3.3)
LYMPHOCYTES # BLD AUTO: 18.8 % — SIGNIFICANT CHANGE UP (ref 13–44)
MAGNESIUM SERPL-MCNC: 2 MG/DL — SIGNIFICANT CHANGE UP (ref 1.6–2.6)
MCHC RBC-ENTMCNC: 31.6 GM/DL — LOW (ref 32–36)
MCHC RBC-ENTMCNC: 32 PG — SIGNIFICANT CHANGE UP (ref 27–34)
MCV RBC AUTO: 101.3 FL — HIGH (ref 80–100)
MONOCYTES # BLD AUTO: 0.56 K/UL — SIGNIFICANT CHANGE UP (ref 0–0.9)
MONOCYTES NFR BLD AUTO: 8.7 % — SIGNIFICANT CHANGE UP (ref 2–14)
NEUTROPHILS # BLD AUTO: 4.24 K/UL — SIGNIFICANT CHANGE UP (ref 1.8–7.4)
NEUTROPHILS NFR BLD AUTO: 66 % — SIGNIFICANT CHANGE UP (ref 43–77)
NRBC # BLD: 0 /100 WBCS — SIGNIFICANT CHANGE UP (ref 0–0)
PHOSPHATE SERPL-MCNC: 3.4 MG/DL — SIGNIFICANT CHANGE UP (ref 2.5–4.5)
PLATELET # BLD AUTO: 229 K/UL — SIGNIFICANT CHANGE UP (ref 150–400)
POTASSIUM SERPL-MCNC: 4.4 MMOL/L — SIGNIFICANT CHANGE UP (ref 3.5–5.3)
POTASSIUM SERPL-SCNC: 4.4 MMOL/L — SIGNIFICANT CHANGE UP (ref 3.5–5.3)
PROT SERPL-MCNC: 5.9 G/DL — LOW (ref 6–8.3)
RBC # BLD: 3.03 M/UL — LOW (ref 3.8–5.2)
RBC # FLD: 12.7 % — SIGNIFICANT CHANGE UP (ref 10.3–14.5)
SODIUM SERPL-SCNC: 142 MMOL/L — SIGNIFICANT CHANGE UP (ref 135–145)
WBC # BLD: 6.43 K/UL — SIGNIFICANT CHANGE UP (ref 3.8–10.5)
WBC # FLD AUTO: 6.43 K/UL — SIGNIFICANT CHANGE UP (ref 3.8–10.5)

## 2022-10-25 PROCEDURE — 71045 X-RAY EXAM CHEST 1 VIEW: CPT | Mod: 26

## 2022-10-25 PROCEDURE — 99232 SBSQ HOSP IP/OBS MODERATE 35: CPT

## 2022-10-25 RX ORDER — LATANOPROST 0.05 MG/ML
1 SOLUTION/ DROPS OPHTHALMIC; TOPICAL AT BEDTIME
Refills: 0 | Status: DISCONTINUED | OUTPATIENT
Start: 2022-10-25 | End: 2022-10-27

## 2022-10-25 RX ORDER — TIMOLOL 0.5 %
1 DROPS OPHTHALMIC (EYE)
Refills: 0 | Status: DISCONTINUED | OUTPATIENT
Start: 2022-10-25 | End: 2022-10-27

## 2022-10-25 RX ORDER — BRIMONIDINE TARTRATE 2 MG/MG
1 SOLUTION/ DROPS OPHTHALMIC
Refills: 0 | Status: DISCONTINUED | OUTPATIENT
Start: 2022-10-25 | End: 2022-10-27

## 2022-10-25 RX ADMIN — AMLODIPINE BESYLATE 2.5 MILLIGRAM(S): 2.5 TABLET ORAL at 05:20

## 2022-10-25 RX ADMIN — Medication 3 MILLILITER(S): at 21:06

## 2022-10-25 RX ADMIN — POLYETHYLENE GLYCOL 3350 17 GRAM(S): 17 POWDER, FOR SOLUTION ORAL at 21:14

## 2022-10-25 RX ADMIN — Medication 3 MILLILITER(S): at 09:53

## 2022-10-25 RX ADMIN — LATANOPROST 1 DROP(S): 0.05 SOLUTION/ DROPS OPHTHALMIC; TOPICAL at 21:14

## 2022-10-25 RX ADMIN — Medication 3 MILLILITER(S): at 15:12

## 2022-10-25 RX ADMIN — SENNA PLUS 2 TABLET(S): 8.6 TABLET ORAL at 21:14

## 2022-10-25 RX ADMIN — LIDOCAINE 1 PATCH: 4 CREAM TOPICAL at 06:09

## 2022-10-25 RX ADMIN — Medication 3 MILLILITER(S): at 03:36

## 2022-10-25 RX ADMIN — LIDOCAINE 1 PATCH: 4 CREAM TOPICAL at 12:31

## 2022-10-25 RX ADMIN — ERTAPENEM SODIUM 120 MILLIGRAM(S): 1 INJECTION, POWDER, LYOPHILIZED, FOR SOLUTION INTRAMUSCULAR; INTRAVENOUS at 17:46

## 2022-10-25 RX ADMIN — LIDOCAINE 1 PATCH: 4 CREAM TOPICAL at 19:21

## 2022-10-25 RX ADMIN — PANTOPRAZOLE SODIUM 40 MILLIGRAM(S): 20 TABLET, DELAYED RELEASE ORAL at 05:20

## 2022-10-25 RX ADMIN — Medication 1 TABLET(S): at 12:28

## 2022-10-25 RX ADMIN — ENOXAPARIN SODIUM 30 MILLIGRAM(S): 100 INJECTION SUBCUTANEOUS at 10:01

## 2022-10-25 NOTE — PROGRESS NOTE ADULT - ASSESSMENT
Patient is a 89y old  Female, from home, hard of hearing, lives alone,  with PMHx of arthritis, Depression, scoliosis, dementia,  now presents to the ER on 10/15/22 for evaluation of a fall. Patient states that she slipped and fell landing on her right side. Patient denies any head trauma or LOC but endorses right hip pain. ON admission, she found to have no fever, but tachycardia., Leukocytosis and positive urine analysis. The Xray of pelvis shows Dislocation femoral component of right hip prosthesis. She underwent Open reduction of Right hip on 10/20/22. The ID consult requested today, 10/21/22, to assist with antibiotic treatment for ESBL E.coli UTI.    # Sepsis ( Tachycardia + Leukocytosis) on admission- resolved  # UTI - 10/16 and Urine Cx grew ESBL  E.coli  # S/p Fall-  Xray of pelvis shows Dislocation femoral component of right hip prosthesis.- s/p Open reduction on 10/20/22    would recommend:    1. Monitor kidney function and IVF  2. Continue Ertapenem to cover ESBL  E.coli in urine, until 10/27/22 X 2 more days   3. Need assistance with feeding   4. Pain management as needed  5.  PT/OOB to chair     Attending Attestation:    Spent more than 35 minutes on total encounter, more than 50 % of the visit was spent counseling and/or coordinating care by the Attending physician.

## 2022-10-25 NOTE — PROGRESS NOTE ADULT - SUBJECTIVE AND OBJECTIVE BOX
Patient is a 89y old  Female who presents with a chief complaint of S/P Fall,Hip dislocation (25 Oct 2022 10:36)  Awake, alert, comfortable in bed in NAD    INTERVAL HPI/OVERNIGHT EVENTS:      VITAL SIGNS:  T(F): 98 (10-25-22 @ 05:15)  HR: 90 (10-25-22 @ 05:15)  BP: 129/46 (10-25-22 @ 05:15)  RR: 18 (10-25-22 @ 05:15)  SpO2: 100% (10-25-22 @ 05:15)  Wt(kg): --  I&O's Detail          REVIEW OF SYSTEMS:    CONSTITUTIONAL:  No fevers, chills, sweats    HEENT:  Eyes:  No diplopia or blurred vision. ENT:  No earache, sore throat or runny nose.    CARDIOVASCULAR:  No pressure, squeezing, tightness, or heaviness about the chest; no palpitations.    RESPIRATORY:  Per HPI    GASTROINTESTINAL:  No abdominal pain, nausea, vomiting or diarrhea.    GENITOURINARY:  No dysuria, frequency or urgency.    NEUROLOGIC:  No paresthesias, fasciculations, seizures or weakness.    PSYCHIATRIC:  No disorder of thought or mood.      PHYSICAL EXAM:    Constitutional: Well developed and nourished  Eyes:Perrla  ENMT: normal  Neck:supple  Respiratory: good air entry  Cardiovascular: S1 S2 regular  Gastrointestinal: Soft, Non tender  Extremities: No edema  Vascular:normal  Neurological:Awake, alert,Ox3  Musculoskeletal:Normal      MEDICATIONS  (STANDING):  ALBUTerol    90 MICROgram(s) HFA Inhaler      albuterol/ipratropium for Nebulization 3 milliLiter(s) Nebulizer every 6 hours  amLODIPine   Tablet 2.5 milliGRAM(s) Oral daily  brimonidine 0.2% Ophthalmic Solution 1 Drop(s) Both EYES two times a day  enoxaparin Injectable 30 milliGRAM(s) SubCutaneous every 12 hours  ertapenem  IVPB 1000 milliGRAM(s) IV Intermittent every 24 hours  latanoprost 0.005% Ophthalmic Solution 1 Drop(s) Both EYES at bedtime  lidocaine   4% Patch 1 Patch Transdermal daily  multivitamin 1 Tablet(s) Oral daily  pantoprazole    Tablet 40 milliGRAM(s) Oral before breakfast  polyethylene glycol 3350 17 Gram(s) Oral at bedtime  senna 2 Tablet(s) Oral at bedtime  sodium chloride 0.9%. 1000 milliLiter(s) (50 mL/Hr) IV Continuous <Continuous>  timolol 0.5% Solution 1 Drop(s) Both EYES two times a day    MEDICATIONS  (PRN):  acetaminophen     Tablet .. 650 milliGRAM(s) Oral every 6 hours PRN Moderate Pain (4 - 6)  magnesium hydroxide Suspension 30 milliLiter(s) Oral daily PRN Constipation  ondansetron Injectable 4 milliGRAM(s) IV Push every 6 hours PRN Nausea and/or Vomiting  oxyCODONE    IR 2.5 milliGRAM(s) Oral every 4 hours PRN Severe Pain (7 - 10)      Allergies    No Known Allergies    Intolerances        LABS:                        9.7    6.43  )-----------( 229      ( 25 Oct 2022 06:53 )             30.7     10-25    142  |  102  |  11  ----------------------------<  94  4.4   |  35<H>  |  0.48<L>    Ca    9.2      25 Oct 2022 06:53  Phos  3.4     10-25  Mg     2.0     10-25    TPro  5.9<L>  /  Alb  2.2<L>  /  TBili  0.3  /  DBili  x   /  AST  27  /  ALT  21  /  AlkPhos  83  10-25              CAPILLARY BLOOD GLUCOSE            RADIOLOGY & ADDITIONAL TESTS:    CXR:    Ct scan chest:    ekg;    echo:

## 2022-10-25 NOTE — PROGRESS NOTE ADULT - SUBJECTIVE AND OBJECTIVE BOX
Source of information: JUSTYNA LOMAX, Chart review  Patient language: English  : n/a    HPI:   89 year old female, from home, hard of hearing, lives alone,  with PMHx of arthritis, Depression, scoliosis, dementia presents after a fall. Patient states that she slipped and fell landing on her right side. Patient denies any head trauma or LOC but endorses right hip pain. Denies any headaches, numbness, tingling, fever, chills, nausea,  vomiting chest pain, SOB, abdominal pain, or change in bowel habits.     In the ED:  Afebrile, hemodynamically stable   CTH: tiny indeterminate lacunar infarcts, CT spine multiple degenerative changes  Xray hip: dislocation right hip   (15 Oct 2022 18:35)    Pt is admitted for R hip pain s/p Fall. hx. of dementia, [place].  XR Pelvis +R hip dislocation. Pt is s/p Open reduction of Right Total Hip dislocation on 10/20/2022. POD# 5. Pain consulted for acute right hip pain. Pt seen and examined at bedside this morning, laying in bed. Patient with hx of dementia but able to answer questions appropriately. Denies pain at this time. SCALE USED: (1-10 VNRS).Pt tolerating po. Denies lethargy, nausea, vomiting, constipation, itchiness. Patient stated goal for pain control: to be able to take deep breaths, get out of bed to chair and ambulate with tolerable pain control. Pt was out of bed to chair yesterday. Requesting her home eyedrops. Ordered as prescribed outpatient. Per attending, plan to be discharged to rehab.    PAST MEDICAL & SURGICAL HISTORY:  Arthritis    No significant past surgical history    FAMILY HISTORY:    Social History:  Lives at home alone, no family, denies any alcohol or tobacco use (15 Oct 2022 18:35)   [x]Denies ETOH use, illicit drug use, and smoking     Allergies    No Known Allergies    MEDICATIONS  (STANDING):  ALBUTerol    90 MICROgram(s) HFA Inhaler      albuterol/ipratropium for Nebulization 3 milliLiter(s) Nebulizer every 6 hours  amLODIPine   Tablet 2.5 milliGRAM(s) Oral daily  brimonidine 0.2% Ophthalmic Solution 1 Drop(s) Both EYES two times a day  enoxaparin Injectable 30 milliGRAM(s) SubCutaneous every 12 hours  ertapenem  IVPB 1000 milliGRAM(s) IV Intermittent every 24 hours  latanoprost 0.005% Ophthalmic Solution 1 Drop(s) Both EYES at bedtime  lidocaine   4% Patch 1 Patch Transdermal daily  multivitamin 1 Tablet(s) Oral daily  pantoprazole    Tablet 40 milliGRAM(s) Oral before breakfast  polyethylene glycol 3350 17 Gram(s) Oral at bedtime  senna 2 Tablet(s) Oral at bedtime  sodium chloride 0.9%. 1000 milliLiter(s) (50 mL/Hr) IV Continuous <Continuous>  timolol 0.5% Solution 1 Drop(s) Both EYES two times a day    MEDICATIONS  (PRN):  acetaminophen     Tablet .. 650 milliGRAM(s) Oral every 6 hours PRN Moderate Pain (4 - 6)  magnesium hydroxide Suspension 30 milliLiter(s) Oral daily PRN Constipation  ondansetron Injectable 4 milliGRAM(s) IV Push every 6 hours PRN Nausea and/or Vomiting  oxyCODONE    IR 2.5 milliGRAM(s) Oral every 4 hours PRN Severe Pain (7 - 10)      Vital Signs Last 24 Hrs  T(C): 36.7 (25 Oct 2022 05:15), Max: 36.7 (24 Oct 2022 12:56)  T(F): 98 (25 Oct 2022 05:15), Max: 98.1 (24 Oct 2022 12:56)  HR: 90 (25 Oct 2022 05:15) (90 - 97)  BP: 129/46 (25 Oct 2022 05:15) (105/63 - 144/67)  BP(mean): --  RR: 18 (25 Oct 2022 05:15) (18 - 18)  SpO2: 100% (25 Oct 2022 05:15) (95% - 100%)    Parameters below as of 25 Oct 2022 05:15  Patient On (Oxygen Delivery Method): nasal cannula      COVID-19 PCR: NotDetec (15 Oct 2022 12:51)    LABS: Reviewed                          9.7    6.43  )-----------( 229      ( 25 Oct 2022 06:53 )             30.7     10-25    142  |  102  |  11  ----------------------------<  94  4.4   |  35<H>  |  0.48<L>    Ca    9.2      25 Oct 2022 06:53  Phos  3.4     10-25  Mg     2.0     10-25    TPro  5.9<L>  /  Alb  2.2<L>  /  TBili  0.3  /  DBili  x   /  AST  27  /  ALT  21  /  AlkPhos  83  10-25      LIVER FUNCTIONS - ( 25 Oct 2022 06:53 )  Alb: 2.2 g/dL / Pro: 5.9 g/dL / ALK PHOS: 83 U/L / ALT: 21 U/L DA / AST: 27 U/L / GGT: x           COVID-19 PCR: NotDetec (15 Oct 2022 12:51)    Radiology: Reviewed  ACC: [id number] EXAM:  XR PELVIS AP ONLY 1-2 VIEWS                          PROCEDURE DATE:  10/15/2022      INTERPRETATION:  History: Fall.    FINDINGS: Frontal pelvis.    COMPARISON: 5/27/2012.    Patient has history of right hip arthroplasty. Dislocation of the femoral   component. No periprosthetic fracture appreciated.    Degenerative change visualized lower lumbar spine.    Calcification overlying the pelvis may represent fibroid calcification.    IMPRESSION:    Dislocation femoral component of right hip prosthesis.    --- End of Report ---      LIDIA JUAREZ MD; Attending Radiologist  This document has been electronically signed. Oct 16 2022  1:57PM    ORT Score -   Family Hx of substance abuse	Female	      Male  Alcohol 	                                           1                     3  Illegal drugs	                                   2                     3  Rx drugs                                           4 	                  4  Personal Hx of substance abuse		  Alcohol 	                                          3	                  3  Illegal drugs                                     4	                  4  Rx drugs                                            5 	                  5  Age between 16- 45 years	           1                     1  hx preadolescent sexual abuse	   3 	                  0  Psychological disease		  ADD, OCD, bipolar, schizophrenia   2	          2  Depression                                           1 	          1  Total: 1    a score of 3 or lower indicates low risk for opioid abuse		  a score of 4-7 indicates moderate risk for opioid abuse		  a score of 8 or higher indicates high risk for opioid abuse    REVIEW OF SYSTEMS:  CONSTITUTIONAL: No fever or fatigue  HEENT:  No difficulty hearing, no change in vision   NECK: No pain or stiffness  RESPIRATORY: No cough, wheezing, chills or hemoptysis; No shortness of breath  CARDIOVASCULAR: No chest pain, palpitations, dizziness, or leg swelling  GASTROINTESTINAL: No loss of appetite, decreased PO intake. No abdominal or epigastric pain. No nausea, vomiting; No diarrhea or constipation.   GENITOURINARY: No dysuria, frequency, hematuria, retention or incontinence  MUSCULOSKELETAL: + right hip pain, sacral back pain; no upper or lower motor strength weakness, no saddle anesthesia, bowel/bladder incontinence, + falls   NEURO: No headaches, No numbness/tingling b/l LE, No weakness  PSYCHIATRIC: + hx of depression, no anxiety or difficulty sleeping    PHYSICAL EXAM:  GENERAL:  Alert & Oriented X2 reoriented to time, able to answer questions, Speech is clear.   RESPIRATORY: Respirations even and unlabored. Clear to auscultation bilaterally; No rales, rhonchi, wheezing, or rubs. On 2 L NC  CARDIOVASCULAR: Normal S1/S2, regular rate and rhythm; No murmurs, rubs, or gallops. No JVD.   GASTROINTESTINAL:  Soft, Nontender, Nondistended; Bowel sounds present.  PERIPHERAL VASCULAR:  Extremities warm without edema. 2+ Peripheral Pulses, No cyanosis, No calf tenderness  MUSCULOSKELETAL: Motor Strength 5/5 B/L upper and 3/5 lower extremities; moves all extremities equally against gravity; ROM decreased RLE; +tenderness on palpation of R hip.   SKIN: Warm, dry, intact. No rashes or lesions. +R hip dressing in place c/d/i, ecchymosis on b/l hands and legs.     Risk factors associated with adverse outcomes related to opioid treatment  [ ]  Concurrent benzodiazepine use  [ ]  History/ Active substance use or alcohol use disorder  [ x) Psychiatric co-morbidity  [ ] Sleep apnea  [ ] COPD  [ ] BMI> 35  [ ] Liver dysfunction  [ ] Renal dysfunction  [ ] CHF  [ ] Smoker  [x]  Age > 60 years    [x]  [place]  Reviewed and Copied to Chart. See below.    Plan of care and goal oriented pain management treatment options were discussed with patient and /or primary care giver; all questions and concerns were addressed and care was aligned with patient's wishes.    Educated patient on goal oriented pain management treatment options     10-25-22 @ 12:19

## 2022-10-25 NOTE — PROGRESS NOTE ADULT - SUBJECTIVE AND OBJECTIVE BOX
CHIEF COMPLAINT:Patient is a 89y old  Female who presents with a chief complaint of S/p open R DOMONIQUE reduction on 10/20/2022.Pt appears comfortable.    	  REVIEW OF SYSTEMS:  CONSTITUTIONAL: No fever, weight loss, or fatigue  EYES: No eye pain, visual disturbances, or discharge  ENT:  No difficulty hearing, tinnitus, vertigo; No sinus or throat pain  NECK: No pain or stiffness  RESPIRATORY: No cough, wheezing, chills or hemoptysis; No Shortness of Breath  CARDIOVASCULAR: No chest pain, palpitations, passing out, dizziness, or leg swelling  GASTROINTESTINAL: No abdominal or epigastric pain. No nausea, vomiting, or hematemesis; No diarrhea or constipation. No melena or hematochezia.  GENITOURINARY: No dysuria, frequency, hematuria, or incontinence  NEUROLOGICAL: No headaches, memory loss, loss of strength, numbness, or tremors  SKIN: No itching, burning, rashes, or lesions   LYMPH Nodes: No enlarged glands  ENDOCRINE: No heat or cold intolerance; No hair loss  MUSCULOSKELETAL: No joint pain or swelling; No muscle, back, or extremity pain  PSYCHIATRIC: No depression, anxiety, mood swings, or difficulty sleeping  HEME/LYMPH: No easy bruising, or bleeding gums  ALLERGY AND IMMUNOLOGIC: No hives or eczema	      PHYSICAL EXAM:  T(C): 36.7 (10-25-22 @ 05:15), Max: 36.7 (10-24-22 @ 12:56)  HR: 90 (10-25-22 @ 05:15) (90 - 97)  BP: 129/46 (10-25-22 @ 05:15) (105/63 - 144/67)  RR: 18 (10-25-22 @ 05:15) (18 - 18)  SpO2: 100% (10-25-22 @ 05:15) (85% - 100%)  Wt(kg): --  I&O's Summary      Appearance: Normal	  HEENT:   Normal oral mucosa, PERRL, EOMI	  Lymphatic: No lymphadenopathy  Cardiovascular: Normal S1 S2, No JVD, No murmurs, No edema  Respiratory: Lungs clear to auscultation	  Psychiatry: A & O x 3, Mood & affect appropriate  Gastrointestinal:  Soft, Non-tender, + BS	  Skin: No rashes, No ecchymoses, No cyanosis	  Neurologic: Non-focal  Extremities: Normal range of motion, No clubbing, cyanosis or edema  Vascular: Peripheral pulses palpable 2+ bilaterally    MEDICATIONS  (STANDING):  ALBUTerol    90 MICROgram(s) HFA Inhaler      albuterol/ipratropium for Nebulization 3 milliLiter(s) Nebulizer every 6 hours  amLODIPine   Tablet 2.5 milliGRAM(s) Oral daily  brimonidine 0.2% Ophthalmic Solution 1 Drop(s) Both EYES two times a day  enoxaparin Injectable 30 milliGRAM(s) SubCutaneous every 12 hours  ertapenem  IVPB 1000 milliGRAM(s) IV Intermittent every 24 hours  latanoprost 0.005% Ophthalmic Solution 1 Drop(s) Both EYES at bedtime  lidocaine   4% Patch 1 Patch Transdermal daily  multivitamin 1 Tablet(s) Oral daily  pantoprazole    Tablet 40 milliGRAM(s) Oral before breakfast  polyethylene glycol 3350 17 Gram(s) Oral at bedtime  senna 2 Tablet(s) Oral at bedtime  sodium chloride 0.9%. 1000 milliLiter(s) (50 mL/Hr) IV Continuous <Continuous>  timolol 0.5% Solution 1 Drop(s) Both EYES two times a day      	  	  LABS:	 	                         9.7    6.43  )-----------( 229      ( 25 Oct 2022 06:53 )             30.7     10-25    142  |  102  |  11  ----------------------------<  94  4.4   |  35<H>  |  0.48<L>    Ca    9.2      25 Oct 2022 06:53  Phos  3.4     10-25  Mg     2.0     10-25    TPro  5.9<L>  /  Alb  2.2<L>  /  TBili  0.3  /  DBili  x   /  AST  27  /  ALT  21  /  AlkPhos  83  10-25      Lipid Profile: Cholesterol 144  HDL 50  TG 69  Ldl calc 80    TSH: Thyroid Stimulating Hormone, Serum: 2.66 uU/mL (10-16 @ 05:30)

## 2022-10-25 NOTE — PROGRESS NOTE ADULT - SUBJECTIVE AND OBJECTIVE BOX
89yFemale    Diagnosis:  S/p open reduction of R Total Hip arthroplasty dislocation POD#5    Patient was seen and evaluated at bedside. Patient with no acute complaints.   Pain is well controlled to the RLE. Denies any issues.   Denies CP/SOB, dyspnea, paresthesias, N/V/D, palpitations.     Vital Signs Last 24 Hrs  T(C): 36.7 (25 Oct 2022 05:15), Max: 36.7 (24 Oct 2022 12:56)  T(F): 98 (25 Oct 2022 05:15), Max: 98.1 (24 Oct 2022 12:56)  HR: 90 (25 Oct 2022 05:15) (88 - 97)  BP: 129/46 (25 Oct 2022 05:15) (105/63 - 144/67)  BP(mean): --  RR: 18 (25 Oct 2022 05:15) (18 - 18)  SpO2: 100% (25 Oct 2022 05:15) (85% - 100%)    Parameters below as of 25 Oct 2022 05:15  Patient On (Oxygen Delivery Method): nasal cannula          Physical Exam:    General: NAD, resting comfortably in bed.    R Hip: No deformity, Dressing is C/D/I. Skin is pink and warm. No erythema. SILT.  Wound with no drainage, healing well.   Lower extremity: Abduction pillow not intact- it appears to be on the chair beside her, No calf tenderness, calves are soft. 2+pulses. NVI. (+)EHL/FHL/ADF/APF.  Good capillary refill. Warm, well-perfused.                           9.1    6.89  )-----------( 208      ( 24 Oct 2022 05:30 )             29.3     10-24    141  |  102  |  8   ----------------------------<  85  4.1   |  34<H>  |  0.39<L>    Ca    8.8      24 Oct 2022 05:30  Phos  2.5     10-24  Mg     1.9     10-24        Impression:  89 y/o F S/p open reduction of R Total Hip arthroplasty dislocation POD#5  Plan:  -  Pain management  -  DVT prophylaxis with Lovenox  -  Daily Physical Therapy:  WBAT to RLE  -  Discharge planning: Rehab  -  Incentive Spirometer  -  Hip precautions/abduction pillow placed  -  Case d/w Dr. Geronimo and medical team

## 2022-10-25 NOTE — PROGRESS NOTE ADULT - PROBLEM SELECTOR PLAN 3
-hematuria with blood clots noted  -billingsley removed yesterday and passed tov  -likely due to trauma from catheter  -f/u Renal US   monitor cbc

## 2022-10-25 NOTE — PROGRESS NOTE ADULT - ASSESSMENT
89yo F, PMH: depression, scoliosis, dementia and arthritis presented to the ED s/p fall and was admitted to [facility], underwent a reduction of the R hip, POD #4, and was found to have ESBL E.coli bacteriuria, currently on ertapenem and ID following. Patient passed TOV yesterday, urinating without difficulty, however noted with an episode of hematuria today. renal us pending.

## 2022-10-25 NOTE — PROGRESS NOTE ADULT - SUBJECTIVE AND OBJECTIVE BOX
Patient is seen and examined at the bed side, is afebrile. She is doing better, sitting up on a chair.       REVIEW OF SYSTEMS: All other review systems are negative      ALLERGIES: No Known Allergies      Vital Signs Last 24 Hrs  T(C): 36.8 (25 Oct 2022 14:23), Max: 36.8 (25 Oct 2022 14:23)  T(F): 98.3 (25 Oct 2022 14:23), Max: 98.3 (25 Oct 2022 14:23)  HR: 99 (25 Oct 2022 14:23) (86 - 99)  BP: 132/68 (25 Oct 2022 14:23) (126/- - 144/67)  BP(mean): --  RR: 18 (25 Oct 2022 14:23) (18 - 18)  SpO2: 99% (25 Oct 2022 14:23) (96% - 100%)    Parameters below as of 25 Oct 2022 14:23  Patient On (Oxygen Delivery Method): nasal cannula      PHYSICAL EXAM:  GENERAL: Not in distress   CHEST/LUNG:  Not using accessory muscles   HEART: s1 and s2 present  ABDOMEN:  Nontender and  Nondistended  EXTREMITIES: No pedal  edema  CNS: Awake and Alert      LABS:                                   9.7    6.43  )-----------( 229      ( 25 Oct 2022 06:53 )             30.7                9.1    6.89  )-----------( 208      ( 24 Oct 2022 05:30 )             29.3       10-25    142  |  102  |  11  ----------------------------<  94  4.4   |  35<H>  |  0.48<L>    Ca    9.2      25 Oct 2022 06:53  Phos  3.4     10-25  Mg     2.0     10-25    TPro  5.9<L>  /  Alb  2.2<L>  /  TBili  0.3  /  DBili  x   /  AST  27  /  ALT  21  /  AlkPhos  83  10-25    10-24    141  |  102  |  8   ----------------------------<  85  4.1   |  34<H>  |  0.39<L>    Ca    8.8      24 Oct 2022 05:30  Phos  2.5     10-24  Mg     1.9     10-24    CAPILLARY BLOOD GLUCOSE  POCT Blood Glucose.: 87 mg/dL (20 Oct 2022 23:56)        MEDICATIONS  (STANDING):    ALBUTerol    90 MICROgram(s) HFA Inhaler      albuterol/ipratropium for Nebulization 3 milliLiter(s) Nebulizer every 6 hours  amLODIPine   Tablet 2.5 milliGRAM(s) Oral daily  brimonidine 0.2% Ophthalmic Solution 1 Drop(s) Both EYES two times a day  enoxaparin Injectable 30 milliGRAM(s) SubCutaneous every 12 hours  ertapenem  IVPB 1000 milliGRAM(s) IV Intermittent every 24 hours  latanoprost 0.005% Ophthalmic Solution 1 Drop(s) Both EYES at bedtime  lidocaine   4% Patch 1 Patch Transdermal daily  multivitamin 1 Tablet(s) Oral daily  pantoprazole    Tablet 40 milliGRAM(s) Oral before breakfast  polyethylene glycol 3350 17 Gram(s) Oral at bedtime  senna 2 Tablet(s) Oral at bedtime  sodium chloride 0.9%. 1000 milliLiter(s) (50 mL/Hr) IV Continuous <Continuous>  timolol 0.5% Solution 1 Drop(s) Both EYES two times a day      RADIOLOGY & ADDITIONAL TESTS:    10/15/22 : CT Cervical Spine No Cont (10.15.22 @ 11:40) >CT HEAD: Mild periventricular white matter ischemia.Tiny indeterminate   lacunar infarction in the anterior limb of the LEFT internal capsule. Mild to moderate cerebellar and temporal lobe atrophy.    CT cervical spine:   No vertebral fracture is recognized.  Straightening with grade 1 anterior spondylolisthesis at C2-3, C3-4 and C7-T1 on a   degenerative basis.    Advanced degenerative disc disease and spondylosis at C3-4 C7-T1 with narrowing of the BILATERAL C2-3 through C6-7 neural   foramina due to uncovertebral spurring and facet osteophytic hypertrophy.   Degenerative cord impingement is seen at C4-5 through C6-7 due to  posterior osteophytic ridge/disc complexes.    10/15/22: CT Head No Cont (10.15.22 @ 11:39) >    CT HEAD: Mild periventricular white matter ischemia. Tiny indeterminate lacunar infarction in the anterior limb of the LEFT internal capsule.   Mild to moderate cerebellar and temporal lobe atrophy.      10/15/22: Xray Pelvis AP only (10.15.22 @ 11:33) >    Dislocation femoral component of right hip prosthesis.        MICROBIOLOGY DATA:    Culture - Urine (10.16.22 @ 01:53)   - Amikacin: S <=16   - Amikacin: S <=16   - Amoxicillin/Clavulanic Acid: S <=8/4   - Amoxicillin/Clavulanic Acid: S <=8/4 Consider reserving for cystitis when ampicillin/sulbactam is resistant   - Ampicillin: R >16 These ampicillin results predict results for amoxicillin   - Ampicillin: R >16 These ampicillin results predict results for amoxicillin   - Ampicillin/Sulbactam: S 8/4 Enterobacter, Klebsiella aerogenes, Citrobacter, and Serratia may develop resistance during prolonged therapy (3-4 days)   - Ampicillin/Sulbactam: R >16/8 Enterobacter, Klebsiella aerogenes, Citrobacter, and Serratia may develop resistance during prolonged therapy (3-4 days)   - Aztreonam: R >16   - Aztreonam: S <=4   - Cefazolin: S <=2 For uncomplicated UTI with K. pneumoniae, E. coli, or P. mirablis: BORIS <=16 is sensitive and BORIS >=32 is resistant. This also predicts results for oral agents cefaclor, cefdinir, cefpodoxime, cefprozil, cefuroxime axetil, cephalexin and locarbef for uncomplicated UTI. Note that some isolates may be susceptible to these agents while testing resistant to cefazolin.   - Cefazolin: R >16 For uncomplicated UTI with K. pneumoniae, E. coli, or P. mirablis: BORIS <=16 is sensitive and BORIS >=32 is resistant. This also predicts results for oral agents cefaclor, cefdinir, cefpodoxime, cefprozil, cefuroxime axetil, cephalexin and locarbef for uncomplicated UTI. Note that some isolates may be susceptible to these agents while testing resistant to cefazolin.   - Cefepime: R >16   - Cefepime: S <=2   - Cefoxitin: S <=8   - Ceftriaxone: S <=1 Enterobacter, Klebsiella aerogenes, Citrobacter, and Serratia may develop resistance during prolonged therapy   - Ceftriaxone: R >32 Enterobacter, Klebsiella aerogenes, Citrobacter, and Serratia may develop resistance during prolonged therapy   - Ciprofloxacin: R >2   - Ciprofloxacin: S <=0.25   - Ertapenem: S <=0.5   - Ertapenem: S <=0.5   - Gentamicin: R >8   - Gentamicin: R >8   - Imipenem: S <=1   - Imipenem: S <=1   - Levofloxacin: R >4   - Levofloxacin: S <=0.5   - Meropenem: S <=1   - Meropenem: S <=1   - Nitrofurantoin: S <=32 Should not be used to treat pyelonephritis   - Nitrofurantoin: S <=32 Should not be used to treat pyelonephritis   - Piperacillin/Tazobactam: S <=8   - Piperacillin/Tazobactam: S 16   - Tigecycline: S <=2   - Tigecycline: S <=2   - Tobramycin: I 8   - Tobramycin: I 8   - Trimethoprim/Sulfamethoxazole: S <=0.5/9.5   - Trimethoprim/Sulfamethoxazole: R >2/38   Specimen Source: Clean Catch Clean Catch (Midstream)   Culture Results:   >100,000 CFU/ml Escherichia coli   >100,000 CFU/ml Escherichia coli ESBL   Organism Identification: Escherichia coli ESBL   Escherichia coli   Organism: Escherichia coli ESBL   Organism: Escherichia coli

## 2022-10-25 NOTE — PROGRESS NOTE ADULT - ASSESSMENT
Confidential Drug Utilization Report  Search Terms: Maria M Hopson, 11/29/1926Search Date: 10/20/2022 09:35:36 AM  The Drug Utilization Report below displays all of the controlled substance prescriptions, if any, that your patient has filled in the last twelve months. The information displayed on this report is compiled from pharmacy submissions to the Department, and accurately reflects the information as submitted by the pharmacies.    This report was requested by: Prachi Zayas | Reference #: [id number]    There are no results for the search terms that you entered.

## 2022-10-25 NOTE — PROGRESS NOTE ADULT - SUBJECTIVE AND OBJECTIVE BOX
Patient is a 89y old  Female who presents with a chief complaint of R hip dislocation (24 Oct 2022 16:09)    pt seen in ed [  ], reg surg floor [ x  ], bed [ x ], chair at bedside [   ], awake and responsive [ x ], lethargic [  ],    nad [ x ]      Allergies    No Known Allergies        Vitals    T(F): 98 (10-25-22 @ 05:15), Max: 98.1 (10-24-22 @ 12:56)  HR: 90 (10-25-22 @ 05:15) (88 - 97)  BP: 129/46 (10-25-22 @ 05:15) (105/63 - 144/67)  RR: 18 (10-25-22 @ 05:15) (18 - 18)  SpO2: 100% (10-25-22 @ 05:15) (85% - 100%)  Wt(kg): --  CAPILLARY BLOOD GLUCOSE          Labs                          9.7    6.43  )-----------( 229      ( 25 Oct 2022 06:53 )             30.7       10-25    142  |  102  |  11  ----------------------------<  94  4.4   |  35<H>  |  0.48<L>    Ca    9.2      25 Oct 2022 06:53  Phos  3.4     10-25  Mg     2.0     10-25    TPro  5.9<L>  /  Alb  2.2<L>  /  TBili  0.3  /  DBili  x   /  AST  27  /  ALT  21  /  AlkPhos  83  10-25            Clean Catch Clean Catch (Midstream)  10-16 @ 01:53   >100,000 CFU/ml Escherichia coli  >100,000 CFU/ml Escherichia coli ESBL  --  Escherichia coli ESBL  Escherichia coli          Radiology Results      Meds    MEDICATIONS  (STANDING):  ALBUTerol    90 MICROgram(s) HFA Inhaler      albuterol/ipratropium for Nebulization 3 milliLiter(s) Nebulizer every 6 hours  amLODIPine   Tablet 2.5 milliGRAM(s) Oral daily  enoxaparin Injectable 30 milliGRAM(s) SubCutaneous every 12 hours  ertapenem  IVPB 1000 milliGRAM(s) IV Intermittent every 24 hours  lidocaine   4% Patch 1 Patch Transdermal daily  multivitamin 1 Tablet(s) Oral daily  pantoprazole    Tablet 40 milliGRAM(s) Oral before breakfast  polyethylene glycol 3350 17 Gram(s) Oral at bedtime  senna 2 Tablet(s) Oral at bedtime  sodium chloride 0.9%. 1000 milliLiter(s) (50 mL/Hr) IV Continuous <Continuous>      MEDICATIONS  (PRN):  acetaminophen     Tablet .. 650 milliGRAM(s) Oral every 6 hours PRN Moderate Pain (4 - 6)  magnesium hydroxide Suspension 30 milliLiter(s) Oral daily PRN Constipation  ondansetron Injectable 4 milliGRAM(s) IV Push every 6 hours PRN Nausea and/or Vomiting  oxyCODONE    IR 2.5 milliGRAM(s) Oral every 4 hours PRN Severe Pain (7 - 10)      Physical Exam    Neuro :  no focal deficits  Respiratory: CTA B/L  CV: RRR, S1S2, no murmurs,   Abdominal: Soft, NT, ND +BS,  Extremities: No edema, + peripheral pulses, right hip dressing cdi         ASSESSMENT    right posterior dislocation of DOMONIQUE  s/p fall,   uti   macrocytic anemia  h/o arthritis,   Depression,   scoliosis,   dementia         PLAN    Right hip open reduction of spontaneous dislocation, hip 20-Oct-2022    ortho f/u   Daily Physical Therapy:  WBAT on RLE with appropriate assistive device  Posterior hip precautions   gi and dvt prophylaxis   incentive spirometer   Hip precautions/abduction pillow placed  phys tx eval noted  and rec daily x 8 WKS at [facility]   cont lidocaine patch,   morphine prn   oxycodone prn   pain mgmt f/u   Opioid pain recommendations   - Continue Oxycodone 2.5 mg/5 mg PO q 4 hours PRN moderate/severe pain post-op. Monitor for sedation/ respiratory depression.   Non-opioid pain recommendations   - Acetaminophen 1 gram PO q 8 hours for 4 days post-op. Monitor LFTs  - Continue Lidocaine patches 4%  Bowel Regimen  - Continue Miralax 17G PO daily  - Continue Senna 2 tablets at bedtime for constipation  cardio f/u   echo with EF = 55 to 60%. Grade I diastolic dysfunction. RV systolic pressure is moderately increased noted    pulm f/u   Oxygen supp. prn  Duoneb nebulizer solution   Amlodipine 2.5 mg PO   incentive spirometer  ucx and sens with esbl e coli   id f/u   Continue Ertapenem to cover ESBL  E.coli in urine, until 10/27/22  vit b12 and folate wnl  cont current meds   d/c plan to [facility]

## 2022-10-25 NOTE — PROGRESS NOTE ADULT - SUBJECTIVE AND OBJECTIVE BOX
NP Note discussed with  Primary Attending    Patient is a 89y old  Female who presents with a chief complaint of R hip dislocation (25 Oct 2022 12:18)      INTERVAL HPI/OVERNIGHT EVENTS: hematuria     MEDICATIONS  (STANDING):  ALBUTerol    90 MICROgram(s) HFA Inhaler      albuterol/ipratropium for Nebulization 3 milliLiter(s) Nebulizer every 6 hours  amLODIPine   Tablet 2.5 milliGRAM(s) Oral daily  brimonidine 0.2% Ophthalmic Solution 1 Drop(s) Both EYES two times a day  enoxaparin Injectable 30 milliGRAM(s) SubCutaneous every 12 hours  ertapenem  IVPB 1000 milliGRAM(s) IV Intermittent every 24 hours  latanoprost 0.005% Ophthalmic Solution 1 Drop(s) Both EYES at bedtime  lidocaine   4% Patch 1 Patch Transdermal daily  multivitamin 1 Tablet(s) Oral daily  pantoprazole    Tablet 40 milliGRAM(s) Oral before breakfast  polyethylene glycol 3350 17 Gram(s) Oral at bedtime  senna 2 Tablet(s) Oral at bedtime  sodium chloride 0.9%. 1000 milliLiter(s) (50 mL/Hr) IV Continuous <Continuous>  timolol 0.5% Solution 1 Drop(s) Both EYES two times a day    MEDICATIONS  (PRN):  acetaminophen     Tablet .. 650 milliGRAM(s) Oral every 6 hours PRN Moderate Pain (4 - 6)  magnesium hydroxide Suspension 30 milliLiter(s) Oral daily PRN Constipation  ondansetron Injectable 4 milliGRAM(s) IV Push every 6 hours PRN Nausea and/or Vomiting  oxyCODONE    IR 2.5 milliGRAM(s) Oral every 4 hours PRN Severe Pain (7 - 10)      __________________________________________________  REVIEW OF SYSTEMS:    CONSTITUTIONAL: No fever,   EYES: no acute visual disturbances  NECK: No pain or stiffness  RESPIRATORY: No cough; No shortness of breath  CARDIOVASCULAR: No chest pain, no palpitations  GASTROINTESTINAL: No pain. No nausea or vomiting; No diarrhea   NEUROLOGICAL: No headache or numbness, no tremors  MUSCULOSKELETAL: No joint pain, no muscle pain  GENITOURINARY: no dysuria, no frequency, no hesitancy + hematuria with clots   PSYCHIATRY: no depression , no anxiety  ALL OTHER  ROS negative        Vital Signs Last 24 Hrs  T(C): 36.8 (25 Oct 2022 14:23), Max: 36.8 (25 Oct 2022 14:23)  T(F): 98.3 (25 Oct 2022 14:23), Max: 98.3 (25 Oct 2022 14:23)  HR: 99 (25 Oct 2022 14:23) (86 - 99)  BP: 132/68 (25 Oct 2022 14:23) (126/- - 144/67)  BP(mean): --  RR: 18 (25 Oct 2022 14:23) (18 - 18)  SpO2: 99% (25 Oct 2022 14:23) (96% - 100%)    Parameters below as of 25 Oct 2022 14:23  Patient On (Oxygen Delivery Method): nasal cannula        ________________________________________________  PHYSICAL EXAM:  GENERAL: NAD  HEENT: Normocephalic;  conjunctivae and sclerae clear; moist mucous membranes;   NECK : supple  CHEST/LUNG: Clear to auscultation bilaterally with good air entry   HEART: S1 S2  regular; no murmurs, gallops or rubs  ABDOMEN: Soft, Nontender, Nondistended; Bowel sounds present  EXTREMITIES: no cyanosis; no edema; no calf tenderness. limited ROM to RLE   SKIN: warm and dry; no rash  NERVOUS SYSTEM:  Awake and alert; Oriented  to place, person and time ; no new deficits    _________________________________________________  LABS:                        9.7    6.43  )-----------( 229      ( 25 Oct 2022 06:53 )             30.7     10-25    142  |  102  |  11  ----------------------------<  94  4.4   |  35<H>  |  0.48<L>    Ca    9.2      25 Oct 2022 06:53  Phos  3.4     10-25  Mg     2.0     10-25    TPro  5.9<L>  /  Alb  2.2<L>  /  TBili  0.3  /  DBili  x   /  AST  27  /  ALT  21  /  AlkPhos  83  10-25        CAPILLARY BLOOD GLUCOSE      RADIOLOGY & ADDITIONAL TESTS:  < from: Xray Pelvis AP only (10.20.22 @ 19:33) >    ACC: [id number] EXAM:  XR PELVIS AP ONLY 1-2 VIEWS                          PROCEDURE DATE:  10/20/2022          INTERPRETATION:  Radiograph of the pelvis    CLINICAL INFORMATION: Postreduction radiographs.    TECHNIQUE: AP pelvic view.    FINDINGS: 10/20/2022 pelvic/RIGHT hip radiographs available for review.    Intraoperative postreduction radiographs show prosthetic femoral head   within acetabular cup. No fracture..    IMPRESSION:   Relocation of femoral head prosthesis.    --- End of Report ---        JAIR LOVELACE MD; Attending Radiologist  This document has been electronically signed. Oct 24 2022  7:38PM    < end of copied text >    Imaging  Reviewed:  YES    Consultant(s) Notes Reviewed:   YES

## 2022-10-25 NOTE — PROGRESS NOTE ADULT - PROBLEM SELECTOR PLAN 1
Pt with acute right hip pain which is somatic in nature due to s/p Open reduction of right total hip dislocation on 10/20/2022. POD# 5. GOHCO. Pt also with acute sacral pain which is somatic due to positioning.    High risk medications reviewed. Avoid polypharmacy. Avoid IV opioids. Avoid NSAIDs and benzodiazepines. Non-pharmacological sleep aides initiated. Non-opioid medications and non-pharmacological pain management measures initiated.  Opioid pain recommendations   - Continue Oxycodone 2.5 mg PO q 4 hours PRN severe pain. Monitor for sedation/ respiratory depression.   Non-opioid pain recommendations   - Continue Acetaminophen 650mg PO q6h PRN moderate pain. Monitor LFTs  - Continue Lidocaine 4% patch to sacrum.   Bowel Regimen  - Continue Miralax 17G PO daily  - Continue Senna 2 tablets at bedtime for constipation  Mild pain   - Non-pharmacological pain treatment recommendations  - Warm/ Cool packs PRN   - Repositioning extremity, elevation, imagery, relaxation, distraction.  - Physical therapy OOB if no contraindications   Recommendations discussed with primary team and RN.

## 2022-10-26 LAB
ANION GAP SERPL CALC-SCNC: 2 MMOL/L — LOW (ref 5–17)
APPEARANCE UR: ABNORMAL
BACTERIA # UR AUTO: ABNORMAL /HPF
BASOPHILS # BLD AUTO: 0.05 K/UL — SIGNIFICANT CHANGE UP (ref 0–0.2)
BASOPHILS NFR BLD AUTO: 0.6 % — SIGNIFICANT CHANGE UP (ref 0–2)
BILIRUB UR-MCNC: NEGATIVE — SIGNIFICANT CHANGE UP
BUN SERPL-MCNC: 10 MG/DL — SIGNIFICANT CHANGE UP (ref 7–18)
CALCIUM SERPL-MCNC: 8.7 MG/DL — SIGNIFICANT CHANGE UP (ref 8.4–10.5)
CHLORIDE SERPL-SCNC: 102 MMOL/L — SIGNIFICANT CHANGE UP (ref 96–108)
CO2 SERPL-SCNC: 38 MMOL/L — HIGH (ref 22–31)
COLOR SPEC: ABNORMAL
COMMENT - URINE: SIGNIFICANT CHANGE UP
CREAT SERPL-MCNC: 0.51 MG/DL — SIGNIFICANT CHANGE UP (ref 0.5–1.3)
DIFF PNL FLD: ABNORMAL
EGFR: 86 ML/MIN/1.73M2 — SIGNIFICANT CHANGE UP
EOSINOPHIL # BLD AUTO: 0.5 K/UL — SIGNIFICANT CHANGE UP (ref 0–0.5)
EOSINOPHIL NFR BLD AUTO: 6.2 % — HIGH (ref 0–6)
EPI CELLS # UR: SIGNIFICANT CHANGE UP /HPF
GLUCOSE SERPL-MCNC: 96 MG/DL — SIGNIFICANT CHANGE UP (ref 70–99)
GLUCOSE UR QL: NEGATIVE — SIGNIFICANT CHANGE UP
HCT VFR BLD CALC: 30.9 % — LOW (ref 34.5–45)
HGB BLD-MCNC: 9.6 G/DL — LOW (ref 11.5–15.5)
IMM GRANULOCYTES NFR BLD AUTO: 0.4 % — SIGNIFICANT CHANGE UP (ref 0–0.9)
KETONES UR-MCNC: ABNORMAL
LEUKOCYTE ESTERASE UR-ACNC: ABNORMAL
LYMPHOCYTES # BLD AUTO: 1.49 K/UL — SIGNIFICANT CHANGE UP (ref 1–3.3)
LYMPHOCYTES # BLD AUTO: 18.5 % — SIGNIFICANT CHANGE UP (ref 13–44)
MAGNESIUM SERPL-MCNC: 2.1 MG/DL — SIGNIFICANT CHANGE UP (ref 1.6–2.6)
MCHC RBC-ENTMCNC: 31.1 GM/DL — LOW (ref 32–36)
MCHC RBC-ENTMCNC: 31.7 PG — SIGNIFICANT CHANGE UP (ref 27–34)
MCV RBC AUTO: 102 FL — HIGH (ref 80–100)
MONOCYTES # BLD AUTO: 0.66 K/UL — SIGNIFICANT CHANGE UP (ref 0–0.9)
MONOCYTES NFR BLD AUTO: 8.2 % — SIGNIFICANT CHANGE UP (ref 2–14)
NEUTROPHILS # BLD AUTO: 5.31 K/UL — SIGNIFICANT CHANGE UP (ref 1.8–7.4)
NEUTROPHILS NFR BLD AUTO: 66.1 % — SIGNIFICANT CHANGE UP (ref 43–77)
NITRITE UR-MCNC: NEGATIVE — SIGNIFICANT CHANGE UP
NRBC # BLD: 0 /100 WBCS — SIGNIFICANT CHANGE UP (ref 0–0)
PH UR: 8 — SIGNIFICANT CHANGE UP (ref 5–8)
PHOSPHATE SERPL-MCNC: 3.6 MG/DL — SIGNIFICANT CHANGE UP (ref 2.5–4.5)
PLATELET # BLD AUTO: 226 K/UL — SIGNIFICANT CHANGE UP (ref 150–400)
POTASSIUM SERPL-MCNC: 4.4 MMOL/L — SIGNIFICANT CHANGE UP (ref 3.5–5.3)
POTASSIUM SERPL-SCNC: 4.4 MMOL/L — SIGNIFICANT CHANGE UP (ref 3.5–5.3)
PROT UR-MCNC: 100
RBC # BLD: 3.03 M/UL — LOW (ref 3.8–5.2)
RBC # FLD: 12.7 % — SIGNIFICANT CHANGE UP (ref 10.3–14.5)
RBC CASTS # UR COMP ASSIST: ABNORMAL /HPF (ref 0–2)
SODIUM SERPL-SCNC: 142 MMOL/L — SIGNIFICANT CHANGE UP (ref 135–145)
SP GR SPEC: 1.01 — SIGNIFICANT CHANGE UP (ref 1.01–1.02)
SURGICAL PATHOLOGY STUDY: SIGNIFICANT CHANGE UP
UROBILINOGEN FLD QL: 1
WBC # BLD: 8.04 K/UL — SIGNIFICANT CHANGE UP (ref 3.8–10.5)
WBC # FLD AUTO: 8.04 K/UL — SIGNIFICANT CHANGE UP (ref 3.8–10.5)
WBC UR QL: ABNORMAL /HPF (ref 0–5)

## 2022-10-26 PROCEDURE — 99232 SBSQ HOSP IP/OBS MODERATE 35: CPT

## 2022-10-26 PROCEDURE — 76770 US EXAM ABDO BACK WALL COMP: CPT | Mod: 26

## 2022-10-26 RX ADMIN — OXYCODONE HYDROCHLORIDE 2.5 MILLIGRAM(S): 5 TABLET ORAL at 09:00

## 2022-10-26 RX ADMIN — ENOXAPARIN SODIUM 30 MILLIGRAM(S): 100 INJECTION SUBCUTANEOUS at 21:25

## 2022-10-26 RX ADMIN — POLYETHYLENE GLYCOL 3350 17 GRAM(S): 17 POWDER, FOR SOLUTION ORAL at 21:26

## 2022-10-26 RX ADMIN — ERTAPENEM SODIUM 120 MILLIGRAM(S): 1 INJECTION, POWDER, LYOPHILIZED, FOR SOLUTION INTRAMUSCULAR; INTRAVENOUS at 18:54

## 2022-10-26 RX ADMIN — Medication 1 DROP(S): at 05:37

## 2022-10-26 RX ADMIN — Medication 1 DROP(S): at 17:20

## 2022-10-26 RX ADMIN — Medication 1 TABLET(S): at 12:21

## 2022-10-26 RX ADMIN — LATANOPROST 1 DROP(S): 0.05 SOLUTION/ DROPS OPHTHALMIC; TOPICAL at 21:25

## 2022-10-26 RX ADMIN — LIDOCAINE 1 PATCH: 4 CREAM TOPICAL at 23:49

## 2022-10-26 RX ADMIN — LIDOCAINE 1 PATCH: 4 CREAM TOPICAL at 00:30

## 2022-10-26 RX ADMIN — AMLODIPINE BESYLATE 2.5 MILLIGRAM(S): 2.5 TABLET ORAL at 05:38

## 2022-10-26 RX ADMIN — LIDOCAINE 1 PATCH: 4 CREAM TOPICAL at 20:14

## 2022-10-26 RX ADMIN — LIDOCAINE 1 PATCH: 4 CREAM TOPICAL at 12:21

## 2022-10-26 RX ADMIN — PANTOPRAZOLE SODIUM 40 MILLIGRAM(S): 20 TABLET, DELAYED RELEASE ORAL at 05:38

## 2022-10-26 RX ADMIN — BRIMONIDINE TARTRATE 1 DROP(S): 2 SOLUTION/ DROPS OPHTHALMIC at 17:20

## 2022-10-26 RX ADMIN — BRIMONIDINE TARTRATE 1 DROP(S): 2 SOLUTION/ DROPS OPHTHALMIC at 05:38

## 2022-10-26 RX ADMIN — OXYCODONE HYDROCHLORIDE 2.5 MILLIGRAM(S): 5 TABLET ORAL at 08:10

## 2022-10-26 RX ADMIN — SENNA PLUS 2 TABLET(S): 8.6 TABLET ORAL at 21:25

## 2022-10-26 RX ADMIN — ENOXAPARIN SODIUM 30 MILLIGRAM(S): 100 INJECTION SUBCUTANEOUS at 08:08

## 2022-10-26 NOTE — PROGRESS NOTE ADULT - PROBLEM SELECTOR PLAN 3
-hematuria with blood clots noted  -billingsley removed yesterday and passed tov  -likely due to trauma from catheter  -f/u Renal US   monitor cbc -hematuria with blood clots noted  -billingsley removed yesterday and passed tov  -likely due to trauma from catheter   Renal US neg for hydro   No labs for 10/27- planning to d/c

## 2022-10-26 NOTE — PROGRESS NOTE ADULT - ASSESSMENT
Confidential Drug Utilization Report  Search Terms: Maria M Hopson, 11/29/1926Search Date: 10/20/2022 09:35:36 AM  The Drug Utilization Report below displays all of the controlled substance prescriptions, if any, that your patient has filled in the last twelve months. The information displayed on this report is compiled from pharmacy submissions to the Department, and accurately reflects the information as submitted by the pharmacies.    This report was requested by: Prachi Zayas | Reference #: [id number]    There are no results for the search terms that you entered.

## 2022-10-26 NOTE — PROGRESS NOTE ADULT - ASSESSMENT
89yo F, PMH: depression, scoliosis, dementia and arthritis presented to the ED s/p fall and was admitted to [facility], underwent a reduction of the R hip, POD #4, and was found to have ESBL E.coli bacteriuria, currently on ertapenem and ID following. Patient passed TOV yesterday, urinating without difficulty, however noted with an episode of hematuria today. renal us pending.    89yo F, PMH: depression, scoliosis, dementia and arthritis presented to the ED s/p fall and was admitted to [facility], underwent a reduction of the R hip, POD #4, and was found to have ESBL E.coli bacteriuria, currently on ertapenem and ID following. Patient passed TOV yesterday, urinating without difficulty, however noted with an episode of hematuria today. renal us pending.  10/26- Pt remained stable and medical clear for d/c.  Labs and US reviewed.  D/c plan for 10/27 per CM.

## 2022-10-26 NOTE — PROGRESS NOTE ADULT - SUBJECTIVE AND OBJECTIVE BOX
Patient is a 89y old  Female who presents with a chief complaint of R hip dislocation (25 Oct 2022 12:18)  Awake, alert, comfortable in bed in NAD. Doing well on oxygen supp via NC    INTERVAL HPI/OVERNIGHT EVENTS:      VITAL SIGNS:  T(F): 97.8 (10-26-22 @ 05:05)  HR: 84 (10-26-22 @ 05:05)  BP: 124/68 (10-26-22 @ 05:05)  RR: 18 (10-26-22 @ 05:05)  SpO2: 100% (10-26-22 @ 05:05)  Wt(kg): --  I&O's Detail          REVIEW OF SYSTEMS:    CONSTITUTIONAL:  No fevers, chills, sweats    HEENT:  Eyes:  No diplopia or blurred vision. ENT:  No earache, sore throat or runny nose.    CARDIOVASCULAR:  No pressure, squeezing, tightness, or heaviness about the chest; no palpitations.    RESPIRATORY:  Per HPI    GASTROINTESTINAL:  No abdominal pain, nausea, vomiting or diarrhea.    GENITOURINARY:  No dysuria, frequency or urgency.    NEUROLOGIC:  No paresthesias, fasciculations, seizures or weakness.    PSYCHIATRIC:  No disorder of thought or mood.      PHYSICAL EXAM:    Constitutional: Well developed and nourished  Eyes:Perrla  ENMT: normal  Neck:supple  Respiratory: good air entry  Cardiovascular: S1 S2 regular  Gastrointestinal: Soft, Non tender  Extremities: No edema  Vascular:normal  Neurological:Awake, alert,Ox3  Musculoskeletal:Normal      MEDICATIONS  (STANDING):  ALBUTerol    90 MICROgram(s) HFA Inhaler      albuterol/ipratropium for Nebulization 3 milliLiter(s) Nebulizer every 6 hours  amLODIPine   Tablet 2.5 milliGRAM(s) Oral daily  brimonidine 0.2% Ophthalmic Solution 1 Drop(s) Both EYES two times a day  enoxaparin Injectable 30 milliGRAM(s) SubCutaneous every 12 hours  ertapenem  IVPB 1000 milliGRAM(s) IV Intermittent every 24 hours  latanoprost 0.005% Ophthalmic Solution 1 Drop(s) Both EYES at bedtime  lidocaine   4% Patch 1 Patch Transdermal daily  multivitamin 1 Tablet(s) Oral daily  pantoprazole    Tablet 40 milliGRAM(s) Oral before breakfast  polyethylene glycol 3350 17 Gram(s) Oral at bedtime  senna 2 Tablet(s) Oral at bedtime  sodium chloride 0.9%. 1000 milliLiter(s) (50 mL/Hr) IV Continuous <Continuous>  timolol 0.5% Solution 1 Drop(s) Both EYES two times a day    MEDICATIONS  (PRN):  acetaminophen     Tablet .. 650 milliGRAM(s) Oral every 6 hours PRN Moderate Pain (4 - 6)  magnesium hydroxide Suspension 30 milliLiter(s) Oral daily PRN Constipation  ondansetron Injectable 4 milliGRAM(s) IV Push every 6 hours PRN Nausea and/or Vomiting  oxyCODONE    IR 2.5 milliGRAM(s) Oral every 4 hours PRN Severe Pain (7 - 10)      Allergies    No Known Allergies    Intolerances        LABS:                        9.6    8.04  )-----------( 226      ( 26 Oct 2022 07:23 )             30.9     10-26    142  |  102  |  10  ----------------------------<  96  4.4   |  38<H>  |  0.51    Ca    8.7      26 Oct 2022 07:23  Phos  3.6     10-26  Mg     2.1     10-26    TPro  5.9<L>  /  Alb  2.2<L>  /  TBili  0.3  /  DBili  x   /  AST  27  /  ALT  21  /  AlkPhos  83  10-25      Urinalysis Basic - ( 26 Oct 2022 00:10 )    Color: Red / Appearance: Slightly Turbid / S.010 / pH: x  Gluc: x / Ketone: Trace  / Bili: Negative / Urobili: 1   Blood: x / Protein: 100 / Nitrite: Negative   Leuk Esterase: Moderate / RBC: 25-50 /HPF / WBC 6-10 /HPF   Sq Epi: x / Non Sq Epi: Few /HPF / Bacteria: Few /HPF            CAPILLARY BLOOD GLUCOSE            RADIOLOGY & ADDITIONAL TESTS:    CXR:    Ct scan chest:    ekg;    echo:

## 2022-10-26 NOTE — PROGRESS NOTE ADULT - SUBJECTIVE AND OBJECTIVE BOX
Source of information: JUSTYNA LOMAX, Chart review  Patient language: English  : n/a    HPI:   89 year old female, from home, hard of hearing, lives alone,  with PMHx of arthritis, Depression, scoliosis, dementia presents after a fall. Patient states that she slipped and fell landing on her right side. Patient denies any head trauma or LOC but endorses right hip pain. Denies any headaches, numbness, tingling, fever, chills, nausea,  vomiting chest pain, SOB, abdominal pain, or change in bowel habits.     In the ED:  Afebrile, hemodynamically stable   CTH: tiny indeterminate lacunar infarcts, CT spine multiple degenerative changes  Xray hip: dislocation right hip   (15 Oct 2022 18:35)    Pt is admitted for R hip pain s/p Fall. hx. of dementia, [place].  XR Pelvis +R hip dislocation. Pt is s/p Open reduction of Right Total Hip dislocation on 10/20/2022, POD# 6. Pain consulted for acute right hip pain. Pt seen and examined at bedside this morning, laying in bed. Patient with hx of dementia but able to answer questions appropriately. Denies hip pain at this time, reports sacral back pain, unable to describe pain. Repositioned pt. Pt tolerating po. Denies lethargy, nausea, vomiting, constipation, itchiness. Patient stated goal for pain control: to be able to take deep breaths, get out of bed to chair and ambulate with tolerable pain control. Pt was out of bed to chair yesterday. Plan to be discharged to rehab.    PAST MEDICAL & SURGICAL HISTORY:  Arthritis    No significant past surgical history    FAMILY HISTORY:    Social History:  Lives at home alone, no family, denies any alcohol or tobacco use (15 Oct 2022 18:35)   [x]Denies ETOH use, illicit drug use, and smoking     Allergies    No Known Allergies      MEDICATIONS  (STANDING):  ALBUTerol    90 MICROgram(s) HFA Inhaler      albuterol/ipratropium for Nebulization 3 milliLiter(s) Nebulizer every 6 hours  amLODIPine   Tablet 2.5 milliGRAM(s) Oral daily  brimonidine 0.2% Ophthalmic Solution 1 Drop(s) Both EYES two times a day  enoxaparin Injectable 30 milliGRAM(s) SubCutaneous every 12 hours  ertapenem  IVPB 1000 milliGRAM(s) IV Intermittent every 24 hours  latanoprost 0.005% Ophthalmic Solution 1 Drop(s) Both EYES at bedtime  lidocaine   4% Patch 1 Patch Transdermal daily  multivitamin 1 Tablet(s) Oral daily  pantoprazole    Tablet 40 milliGRAM(s) Oral before breakfast  polyethylene glycol 3350 17 Gram(s) Oral at bedtime  senna 2 Tablet(s) Oral at bedtime  sodium chloride 0.9%. 1000 milliLiter(s) (50 mL/Hr) IV Continuous <Continuous>  timolol 0.5% Solution 1 Drop(s) Both EYES two times a day    MEDICATIONS  (PRN):  acetaminophen     Tablet .. 650 milliGRAM(s) Oral every 6 hours PRN Moderate Pain (4 - 6)  magnesium hydroxide Suspension 30 milliLiter(s) Oral daily PRN Constipation  ondansetron Injectable 4 milliGRAM(s) IV Push every 6 hours PRN Nausea and/or Vomiting  oxyCODONE    IR 2.5 milliGRAM(s) Oral every 4 hours PRN Severe Pain (7 - 10)      Vital Signs Last 24 Hrs  T(C): 36.6 (26 Oct 2022 05:05), Max: 36.8 (25 Oct 2022 14:23)  T(F): 97.8 (26 Oct 2022 05:05), Max: 98.3 (25 Oct 2022 14:23)  HR: 84 (26 Oct 2022 05:05) (84 - 99)  BP: 124/68 (26 Oct 2022 05:05) (114/45 - 132/68)  BP(mean): 62 (25 Oct 2022 20:47) (62 - 62)  RR: 18 (26 Oct 2022 05:05) (18 - 18)  SpO2: 100% (26 Oct 2022 05:05) (99% - 100%)    Parameters below as of 26 Oct 2022 05:05  Patient On (Oxygen Delivery Method): nasal cannula  O2 Flow (L/min): 2    COVID-19 PCR: NotDetec (15 Oct 2022 12:51)    LABS: Reviewed                          9.6    8.04  )-----------( 226      ( 26 Oct 2022 07:23 )             30.9     10-    142  |  102  |  10  ----------------------------<  96  4.4   |  38<H>  |  0.51    Ca    8.7      26 Oct 2022 07:23  Phos  3.6     10-26  Mg     2.1     10-26    TPro  5.9<L>  /  Alb  2.2<L>  /  TBili  0.3  /  DBili  x   /  AST  27  /  ALT  21  /  AlkPhos  83  10-25      LIVER FUNCTIONS - ( 25 Oct 2022 06:53 )  Alb: 2.2 g/dL / Pro: 5.9 g/dL / ALK PHOS: 83 U/L / ALT: 21 U/L DA / AST: 27 U/L / GGT: x           Urinalysis Basic - ( 26 Oct 2022 00:10 )    Color: Red / Appearance: Slightly Turbid / S.010 / pH: x  Gluc: x / Ketone: Trace  / Bili: Negative / Urobili: 1   Blood: x / Protein: 100 / Nitrite: Negative   Leuk Esterase: Moderate / RBC: 25-50 /HPF / WBC 6-10 /HPF   Sq Epi: x / Non Sq Epi: Few /HPF / Bacteria: Few /HPF    COVID-19 PCR: NotDetec (15 Oct 2022 12:51)    Radiology: Reviewed  ACC: [id number] EXAM:  XR PELVIS AP ONLY 1-2 VIEWS                          PROCEDURE DATE:  10/15/2022      INTERPRETATION:  History: Fall.    FINDINGS: Frontal pelvis.    COMPARISON: 2012.    Patient has history of right hip arthroplasty. Dislocation of the femoral   component. No periprosthetic fracture appreciated.    Degenerative change visualized lower lumbar spine.    Calcification overlying the pelvis may represent fibroid calcification.    IMPRESSION:    Dislocation femoral component of right hip prosthesis.    --- End of Report ---      LIDIA JUAREZ MD; Attending Radiologist  This document has been electronically signed. Oct 16 2022  1:57PM    ORT Score -   Family Hx of substance abuse	Female	      Male  Alcohol 	                                           1                     3  Illegal drugs	                                   2                     3  Rx drugs                                           4 	                  4  Personal Hx of substance abuse		  Alcohol 	                                          3	                  3  Illegal drugs                                     4	                  4  Rx drugs                                            5 	                  5  Age between 16- 45 years	           1                     1  hx preadolescent sexual abuse	   3 	                  0  Psychological disease		  ADD, OCD, bipolar, schizophrenia   2	          2  Depression                                           1 	          1  Total: 1    a score of 3 or lower indicates low risk for opioid abuse		  a score of 4-7 indicates moderate risk for opioid abuse		  a score of 8 or higher indicates high risk for opioid abuse    REVIEW OF SYSTEMS:  CONSTITUTIONAL: No fever or fatigue  HEENT:  + [place]  RESPIRATORY: No cough, wheezing, chills or hemoptysis; No shortness of breath  CARDIOVASCULAR: No chest pain, palpitations, dizziness, or leg swelling  GASTROINTESTINAL: No loss of appetite, decreased PO intake. No abdominal or epigastric pain. No nausea, vomiting; No diarrhea or constipation.   GENITOURINARY: No dysuria, frequency, hematuria, retention or incontinence  MUSCULOSKELETAL: + right hip pain, sacral back pain; no upper or lower motor strength weakness, no saddle anesthesia, bowel/bladder incontinence, + falls   NEURO: No headaches, No numbness/tingling b/l LE, No weakness  PSYCHIATRIC: + hx of depression, no anxiety or difficulty sleeping    PHYSICAL EXAM:  GENERAL:  Alert & Oriented X2 reoriented to time, able to answer questions, Speech is clear.   RESPIRATORY: Respirations even and unlabored. Clear to auscultation bilaterally; No rales, rhonchi, wheezing, or rubs. On 2 L NC  CARDIOVASCULAR: Normal S1/S2, regular rate and rhythm; No murmurs, rubs, or gallops. No JVD.   GASTROINTESTINAL:  Soft, Nontender, Nondistended; Bowel sounds present.  PERIPHERAL VASCULAR:  Extremities warm without edema. 2+ Peripheral Pulses, No cyanosis, No calf tenderness  MUSCULOSKELETAL: Motor Strength 5/5 B/L upper and 3/5 lower extremities; moves all extremities equally against gravity; ROM decreased RLE; +tenderness on palpation of R hip.   SKIN: Warm, dry, intact. No rashes or lesions. +R hip dressing in place c/d/i, ecchymosis on b/l hands and legs.     Risk factors associated with adverse outcomes related to opioid treatment  [ ]  Concurrent benzodiazepine use  [ ]  History/ Active substance use or alcohol use disorder  [ x) Psychiatric co-morbidity  [ ] Sleep apnea  [ ] COPD  [ ] BMI> 35  [ ] Liver dysfunction  [ ] Renal dysfunction  [ ] CHF  [ ] Smoker  [x]  Age > 60 years    [x]  [place]  Reviewed and Copied to Chart. See below.    Plan of care and goal oriented pain management treatment options were discussed with patient and /or primary care giver; all questions and concerns were addressed and care was aligned with patient's wishes.    Educated patient on goal oriented pain management treatment options     10-26-22 @ 13:52

## 2022-10-26 NOTE — PROGRESS NOTE ADULT - PROBLEM SELECTOR PLAN 2
ESBL E.coli bacteriuria  afebrile   leukocytosis resolved  on ertapenem til 10/27  ID Dr. Mullen  TOV today ESBL E.coli bacteriuria  afebrile   leukocytosis resolved  on ertapenem til 10/27  ID Dr. Mullen

## 2022-10-26 NOTE — PROGRESS NOTE ADULT - ASSESSMENT
89 year old female, from home, hard of hearing, lives alone,  with PMHx of arthritis, Depression, scoliosis, dementia presents after a fall. Patient states that she slipped and fell landing on her right side with hip dislocation,UTI-ESBL.  1.PT.  2.Ortho f/u.  3.UTI-Abx completed.  4.Pain control.  5.GI and DVT prophylaxis.

## 2022-10-26 NOTE — PROGRESS NOTE ADULT - SUBJECTIVE AND OBJECTIVE BOX
Patient is a 89y old  Female who presents with a chief complaint of R hip dislocation (25 Oct 2022 12:18)    pt seen in icu [  ], reg med floor [   ], bed [  ], chair at bedside [   ], a+o x3 [  ], lethargic [  ],  nad [  ]    billingsley [  ], ngt [  ], peg [  ], et tube [  ], cent line [  ], picc line [  ]        Allergies    No Known Allergies        Vitals    T(F): 97.8 (10-26-22 @ 05:05), Max: 98.3 (10-25-22 @ 14:23)  HR: 84 (10-26-22 @ 05:05) (84 - 99)  BP: 124/68 (10-26-22 @ 05:05) (114/45 - 132/68)  RR: 18 (10-26-22 @ 05:05) (18 - 18)  SpO2: 100% (10-26-22 @ 05:05) (99% - 100%)  Wt(kg): --  CAPILLARY BLOOD GLUCOSE          Labs                          9.6    8.04  )-----------( 226      ( 26 Oct 2022 07:23 )             30.9       10-26    142  |  102  |  10  ----------------------------<  96  4.4   |  38<H>  |  0.51    Ca    8.7      26 Oct 2022 07:23  Phos  3.6     10-26  Mg     2.1     10-26    TPro  5.9<L>  /  Alb  2.2<L>  /  TBili  0.3  /  DBili  x   /  AST  27  /  ALT  21  /  AlkPhos  83  10-25            Clean Catch Clean Catch (Midstream)  10-16 @ 01:53   >100,000 CFU/ml Escherichia coli  >100,000 CFU/ml Escherichia coli ESBL  --  Escherichia coli ESBL  Escherichia coli          Radiology Results      Meds    MEDICATIONS  (STANDING):  ALBUTerol    90 MICROgram(s) HFA Inhaler      albuterol/ipratropium for Nebulization 3 milliLiter(s) Nebulizer every 6 hours  amLODIPine   Tablet 2.5 milliGRAM(s) Oral daily  brimonidine 0.2% Ophthalmic Solution 1 Drop(s) Both EYES two times a day  enoxaparin Injectable 30 milliGRAM(s) SubCutaneous every 12 hours  ertapenem  IVPB 1000 milliGRAM(s) IV Intermittent every 24 hours  latanoprost 0.005% Ophthalmic Solution 1 Drop(s) Both EYES at bedtime  lidocaine   4% Patch 1 Patch Transdermal daily  multivitamin 1 Tablet(s) Oral daily  pantoprazole    Tablet 40 milliGRAM(s) Oral before breakfast  polyethylene glycol 3350 17 Gram(s) Oral at bedtime  senna 2 Tablet(s) Oral at bedtime  sodium chloride 0.9%. 1000 milliLiter(s) (50 mL/Hr) IV Continuous <Continuous>  timolol 0.5% Solution 1 Drop(s) Both EYES two times a day      MEDICATIONS  (PRN):  acetaminophen     Tablet .. 650 milliGRAM(s) Oral every 6 hours PRN Moderate Pain (4 - 6)  magnesium hydroxide Suspension 30 milliLiter(s) Oral daily PRN Constipation  ondansetron Injectable 4 milliGRAM(s) IV Push every 6 hours PRN Nausea and/or Vomiting  oxyCODONE    IR 2.5 milliGRAM(s) Oral every 4 hours PRN Severe Pain (7 - 10)      Physical Exam    Neuro :  no focal deficits  Respiratory: CTA B/L  CV: RRR, S1S2, no murmurs,   Abdominal: Soft, NT, ND +BS,  Extremities: No edema, + peripheral pulses    ASSESSMENT    Fracture of unspecified part of neck of unspecified femur, initial encounter for closed fracture    Arthritis    No significant past surgical history        PLAN     Patient is a 89y old  Female who presents with a chief complaint of R hip dislocation (25 Oct 2022 12:18)      pt seen in ed [  ], reg surg floor [ x  ], bed [ x ], chair at bedside [   ], awake and responsive [ x ], lethargic [  ],    nad [ x ]      Allergies    No Known Allergies        Vitals    T(F): 97.8 (10-26-22 @ 05:05), Max: 98.3 (10-25-22 @ 14:23)  HR: 84 (10-26-22 @ 05:05) (84 - 99)  BP: 124/68 (10-26-22 @ 05:05) (114/45 - 132/68)  RR: 18 (10-26-22 @ 05:05) (18 - 18)  SpO2: 100% (10-26-22 @ 05:05) (99% - 100%)  Wt(kg): --  CAPILLARY BLOOD GLUCOSE          Labs                          9.6    8.04  )-----------( 226      ( 26 Oct 2022 07:23 )             30.9       10-26    142  |  102  |  10  ----------------------------<  96  4.4   |  38<H>  |  0.51    Ca    8.7      26 Oct 2022 07:23  Phos  3.6     10-26  Mg     2.1     10-26    TPro  5.9<L>  /  Alb  2.2<L>  /  TBili  0.3  /  DBili  x   /  AST  27  /  ALT  21  /  AlkPhos  83  10-25            Clean Catch Clean Catch (Midstream)  10-16 @ 01:53   >100,000 CFU/ml Escherichia coli  >100,000 CFU/ml Escherichia coli ESBL  --  Escherichia coli ESBL  Escherichia coli          Radiology Results      Meds    MEDICATIONS  (STANDING):  ALBUTerol    90 MICROgram(s) HFA Inhaler      albuterol/ipratropium for Nebulization 3 milliLiter(s) Nebulizer every 6 hours  amLODIPine   Tablet 2.5 milliGRAM(s) Oral daily  brimonidine 0.2% Ophthalmic Solution 1 Drop(s) Both EYES two times a day  enoxaparin Injectable 30 milliGRAM(s) SubCutaneous every 12 hours  ertapenem  IVPB 1000 milliGRAM(s) IV Intermittent every 24 hours  latanoprost 0.005% Ophthalmic Solution 1 Drop(s) Both EYES at bedtime  lidocaine   4% Patch 1 Patch Transdermal daily  multivitamin 1 Tablet(s) Oral daily  pantoprazole    Tablet 40 milliGRAM(s) Oral before breakfast  polyethylene glycol 3350 17 Gram(s) Oral at bedtime  senna 2 Tablet(s) Oral at bedtime  sodium chloride 0.9%. 1000 milliLiter(s) (50 mL/Hr) IV Continuous <Continuous>  timolol 0.5% Solution 1 Drop(s) Both EYES two times a day      MEDICATIONS  (PRN):  acetaminophen     Tablet .. 650 milliGRAM(s) Oral every 6 hours PRN Moderate Pain (4 - 6)  magnesium hydroxide Suspension 30 milliLiter(s) Oral daily PRN Constipation  ondansetron Injectable 4 milliGRAM(s) IV Push every 6 hours PRN Nausea and/or Vomiting  oxyCODONE    IR 2.5 milliGRAM(s) Oral every 4 hours PRN Severe Pain (7 - 10)      Physical Exam    Neuro :  no focal deficits  Respiratory: CTA B/L  CV: RRR, S1S2, no murmurs,   Abdominal: Soft, NT, ND +BS,  Extremities: No edema, + peripheral pulses, right hip dressing cdi         ASSESSMENT    right posterior dislocation of DOMONIQUE  s/p fall,   uti   macrocytic anemia  h/o arthritis,   Depression,   scoliosis,   dementia         PLAN    Right hip open reduction of spontaneous dislocation, hip 20-Oct-2022    ortho f/u   Daily Physical Therapy:  WBAT on RLE with appropriate assistive device  Posterior hip precautions   gi and dvt prophylaxis   incentive spirometer   Hip precautions/abduction pillow placed  phys tx eval noted  and rec daily x 8 WKS at [facility]   cont lidocaine patch,   morphine prn   oxycodone prn   pain mgmt f/u   Opioid pain recommendations   - Continue Oxycodone 2.5 mg/5 mg PO q 4 hours PRN moderate/severe pain post-op. Monitor for sedation/ respiratory depression.   Non-opioid pain recommendations   - Acetaminophen 1 gram PO q 8 hours for 4 days post-op. Monitor LFTs  - Continue Lidocaine patches 4%  Bowel Regimen  - Continue Miralax 17G PO daily  - Continue Senna 2 tablets at bedtime for constipation  cardio f/u   echo with EF = 55 to 60%. Grade I diastolic dysfunction. RV systolic pressure is moderately increased noted    pulm f/u   Oxygen supp. prn  Duoneb nebulizer solution   Amlodipine 2.5 mg PO   incentive spirometer  ucx and sens with esbl e coli   id f/u   Continue Ertapenem to cover ESBL  E.coli in urine, until 10/27/22   f/u renal bladder us to eval hematuria  h/h stable  vit b12 and folate wnl  cont current meds   d/c plan to [facility]

## 2022-10-26 NOTE — PROGRESS NOTE ADULT - SUBJECTIVE AND OBJECTIVE BOX
Patient is seen and examined at the bed side, is afebrile. She mentioned feeling better. The WBC count and kidney function stay normal.       REVIEW OF SYSTEMS: All other review systems are negative      ALLERGIES: No Known Allergies      Vital Signs Last 24 Hrs  T(C): 36.8 (26 Oct 2022 14:27), Max: 36.8 (26 Oct 2022 14:27)  T(F): 98.2 (26 Oct 2022 14:27), Max: 98.2 (26 Oct 2022 14:27)  HR: 83 (26 Oct 2022 14:27) (83 - 95)  BP: 110/59 (26 Oct 2022 14:27) (110/59 - 124/68)  BP(mean): 62 (25 Oct 2022 20:47) (62 - 62)  RR: 18 (26 Oct 2022 14:27) (18 - 18)  SpO2: 100% (26 Oct 2022 14:27) (99% - 100%)    Parameters below as of 26 Oct 2022 14:27  Patient On (Oxygen Delivery Method): nasal cannula      PHYSICAL EXAM:  GENERAL: Not in distress   CHEST/LUNG:  Not using accessory muscles   HEART: s1 and s2 present  ABDOMEN:  Nontender and  Nondistended  EXTREMITIES: No pedal  edema  CNS: Awake and Alert      LABS:                        9.6    8.04  )-----------( 226      ( 26 Oct 2022 07:23 )             30.9                                  9.7    6.43  )-----------( 229      ( 25 Oct 2022 06:53 )             30.7       10-26    142  |  102  |  10  ----------------------------<  96  4.4   |  38<H>  |  0.51    Ca    8.7      26 Oct 2022 07:23  Phos  3.6     10-26  Mg     2.1     10-26    TPro  5.9<L>  /  Alb  2.2<L>  /  TBili  0.3  /  DBili  x   /  AST  27  /  ALT  21  /  AlkPhos  83  10-25    10-25    142  |  102  |  11  ----------------------------<  94  4.4   |  35<H>  |  0.48<L>    Ca    9.2      25 Oct 2022 06:53  Phos  3.4     10-25  Mg     2.0     10-25    TPro  5.9<L>  /  Alb  2.2<L>  /  TBili  0.3  /  DBili  x   /  AST  27  /  ALT  21  /  AlkPhos  83  10-25        CAPILLARY BLOOD GLUCOSE  POCT Blood Glucose.: 87 mg/dL (20 Oct 2022 23:56)        MEDICATIONS  (STANDING):    ALBUTerol    90 MICROgram(s) HFA Inhaler      albuterol/ipratropium for Nebulization 3 milliLiter(s) Nebulizer every 6 hours  amLODIPine   Tablet 2.5 milliGRAM(s) Oral daily  brimonidine 0.2% Ophthalmic Solution 1 Drop(s) Both EYES two times a day  enoxaparin Injectable 30 milliGRAM(s) SubCutaneous every 12 hours  ertapenem  IVPB 1000 milliGRAM(s) IV Intermittent every 24 hours  latanoprost 0.005% Ophthalmic Solution 1 Drop(s) Both EYES at bedtime  lidocaine   4% Patch 1 Patch Transdermal daily  multivitamin 1 Tablet(s) Oral daily  pantoprazole    Tablet 40 milliGRAM(s) Oral before breakfast  polyethylene glycol 3350 17 Gram(s) Oral at bedtime  senna 2 Tablet(s) Oral at bedtime  sodium chloride 0.9%. 1000 milliLiter(s) (50 mL/Hr) IV Continuous <Continuous>  timolol 0.5% Solution 1 Drop(s) Both EYES two times a day      RADIOLOGY & ADDITIONAL TESTS:    10/15/22 : CT Cervical Spine No Cont (10.15.22 @ 11:40) >CT HEAD: Mild periventricular white matter ischemia.Tiny indeterminate   lacunar infarction in the anterior limb of the LEFT internal capsule. Mild to moderate cerebellar and temporal lobe atrophy.    CT cervical spine:   No vertebral fracture is recognized.  Straightening with grade 1 anterior spondylolisthesis at C2-3, C3-4 and C7-T1 on a   degenerative basis.    Advanced degenerative disc disease and spondylosis at C3-4 C7-T1 with narrowing of the BILATERAL C2-3 through C6-7 neural   foramina due to uncovertebral spurring and facet osteophytic hypertrophy.   Degenerative cord impingement is seen at C4-5 through C6-7 due to  posterior osteophytic ridge/disc complexes.    10/15/22: CT Head No Cont (10.15.22 @ 11:39) >    CT HEAD: Mild periventricular white matter ischemia. Tiny indeterminate lacunar infarction in the anterior limb of the LEFT internal capsule.   Mild to moderate cerebellar and temporal lobe atrophy.      10/15/22: Xray Pelvis AP only (10.15.22 @ 11:33) >    Dislocation femoral component of right hip prosthesis.        MICROBIOLOGY DATA:    Culture - Urine (10.16.22 @ 01:53)   - Amikacin: S <=16   - Amikacin: S <=16   - Amoxicillin/Clavulanic Acid: S <=8/4   - Amoxicillin/Clavulanic Acid: S <=8/4 Consider reserving for cystitis when ampicillin/sulbactam is resistant   - Ampicillin: R >16 These ampicillin results predict results for amoxicillin   - Ampicillin: R >16 These ampicillin results predict results for amoxicillin   - Ampicillin/Sulbactam: S 8/4 Enterobacter, Klebsiella aerogenes, Citrobacter, and Serratia may develop resistance during prolonged therapy (3-4 days)   - Ampicillin/Sulbactam: R >16/8 Enterobacter, Klebsiella aerogenes, Citrobacter, and Serratia may develop resistance during prolonged therapy (3-4 days)   - Aztreonam: R >16   - Aztreonam: S <=4   - Cefazolin: S <=2 For uncomplicated UTI with K. pneumoniae, E. coli, or P. mirablis: BORIS <=16 is sensitive and BORIS >=32 is resistant. This also predicts results for oral agents cefaclor, cefdinir, cefpodoxime, cefprozil, cefuroxime axetil, cephalexin and locarbef for uncomplicated UTI. Note that some isolates may be susceptible to these agents while testing resistant to cefazolin.   - Cefazolin: R >16 For uncomplicated UTI with K. pneumoniae, E. coli, or P. mirablis: BORIS <=16 is sensitive and BORIS >=32 is resistant. This also predicts results for oral agents cefaclor, cefdinir, cefpodoxime, cefprozil, cefuroxime axetil, cephalexin and locarbef for uncomplicated UTI. Note that some isolates may be susceptible to these agents while testing resistant to cefazolin.   - Cefepime: R >16   - Cefepime: S <=2   - Cefoxitin: S <=8   - Ceftriaxone: S <=1 Enterobacter, Klebsiella aerogenes, Citrobacter, and Serratia may develop resistance during prolonged therapy   - Ceftriaxone: R >32 Enterobacter, Klebsiella aerogenes, Citrobacter, and Serratia may develop resistance during prolonged therapy   - Ciprofloxacin: R >2   - Ciprofloxacin: S <=0.25   - Ertapenem: S <=0.5   - Ertapenem: S <=0.5   - Gentamicin: R >8   - Gentamicin: R >8   - Imipenem: S <=1   - Imipenem: S <=1   - Levofloxacin: R >4   - Levofloxacin: S <=0.5   - Meropenem: S <=1   - Meropenem: S <=1   - Nitrofurantoin: S <=32 Should not be used to treat pyelonephritis   - Nitrofurantoin: S <=32 Should not be used to treat pyelonephritis   - Piperacillin/Tazobactam: S <=8   - Piperacillin/Tazobactam: S 16   - Tigecycline: S <=2   - Tigecycline: S <=2   - Tobramycin: I 8   - Tobramycin: I 8   - Trimethoprim/Sulfamethoxazole: S <=0.5/9.5   - Trimethoprim/Sulfamethoxazole: R >2/38   Specimen Source: Clean Catch Clean Catch (Midstream)   Culture Results:   >100,000 CFU/ml Escherichia coli   >100,000 CFU/ml Escherichia coli ESBL   Organism Identification: Escherichia coli ESBL   Escherichia coli   Organism: Escherichia coli ESBL   Organism: Escherichia coli

## 2022-10-26 NOTE — PROGRESS NOTE ADULT - SUBJECTIVE AND OBJECTIVE BOX
89yFemale    Diagnosis:  S/p open reduction of R Total Hip arthroplasty dislocation POD#6    Patient was seen and evaluated at bedside. Patient with no acute complaints.   Pain is well controlled to the RLE. Denies any issues.   Denies CP/SOB, dyspnea, paresthesias, N/V/D, palpitations.     Vital Signs Last 24 Hrs  T(C): 36.6 (26 Oct 2022 05:05), Max: 36.8 (25 Oct 2022 14:23)  T(F): 97.8 (26 Oct 2022 05:05), Max: 98.3 (25 Oct 2022 14:23)  HR: 84 (26 Oct 2022 05:05) (84 - 99)  BP: 124/68 (26 Oct 2022 05:05) (114/45 - 132/68)  BP(mean): 62 (25 Oct 2022 20:47) (62 - 62)  RR: 18 (26 Oct 2022 05:05) (18 - 18)  SpO2: 100% (26 Oct 2022 05:05) (99% - 100%)    Parameters below as of 26 Oct 2022 05:05  Patient On (Oxygen Delivery Method): nasal cannula  O2 Flow (L/min): 2          Physical Exam:    General: NAD, resting comfortably in bed.    R Hip: No deformity, Dressing is C/D/I. Skin is pink and warm. No erythema. SILT.  Wound with no drainage, healing well.   Lower extremity: Abduction pillow not intact- it appears to be on the chair beside her, No calf tenderness, calves are soft. 2+pulses. NVI. (+)EHL/FHL/ADF/APF.  Good capillary refill. Warm, well-perfused.                           9.1    6.89  )-----------( 208      ( 24 Oct 2022 05:30 )             29.3     10-24    141  |  102  |  8   ----------------------------<  85  4.1   |  34<H>  |  0.39<L>    Ca    8.8      24 Oct 2022 05:30  Phos  2.5     10-24  Mg     1.9     10-24        Impression:  89 y/o F S/p open reduction of R Total Hip arthroplasty dislocation POD#6  Plan:  -  Pain management  -  DVT prophylaxis with Lovenox  -  Daily Physical Therapy:  WBAT to RLE  -  Discharge planning: Rehab  -  Incentive Spirometer  -  Hip precautions/abduction pillow placed  -  Case d/w Dr. Geronimo and medical team

## 2022-10-26 NOTE — PROGRESS NOTE ADULT - ASSESSMENT
Patient is a 89y old  Female, from home, hard of hearing, lives alone,  with PMHx of arthritis, Depression, scoliosis, dementia,  now presents to the ER on 10/15/22 for evaluation of a fall. Patient states that she slipped and fell landing on her right side. Patient denies any head trauma or LOC but endorses right hip pain. ON admission, she found to have no fever, but tachycardia., Leukocytosis and positive urine analysis. The Xray of pelvis shows Dislocation femoral component of right hip prosthesis. She underwent Open reduction of Right hip on 10/20/22. The ID consult requested today, 10/21/22, to assist with antibiotic treatment for ESBL E.coli UTI.    # Sepsis ( Tachycardia + Leukocytosis) on admission- resolved  # UTI - 10/16 and Urine Cx grew ESBL  E.coli  # S/p Fall-  Xray of pelvis shows Dislocation femoral component of right hip prosthesis.- s/p Open reduction on 10/20/22    would recommend:    1. PT/OOB to chair   2. Continue Ertapenem to cover ESBL  E.coli in urine, until 10/27/22 X 1 more day  3. Need assistance with feeding   4. Pain management as needed    Attending Attestation:    Spent more than 35 minutes on total encounter, more than 50 % of the visit was spent counseling and/or coordinating care by the Attending physician.

## 2022-10-26 NOTE — PROGRESS NOTE ADULT - SUBJECTIVE AND OBJECTIVE BOX
NP Note discussed with  Primary Attending    Patient is a 89y old  Female who presents with a chief complaint of S/P Fall,Hip dislocation (26 Oct 2022 12:15)      INTERVAL HPI/OVERNIGHT EVENTS: no new complaints    MEDICATIONS  (STANDING):  ALBUTerol    90 MICROgram(s) HFA Inhaler      albuterol/ipratropium for Nebulization 3 milliLiter(s) Nebulizer every 6 hours  amLODIPine   Tablet 2.5 milliGRAM(s) Oral daily  brimonidine 0.2% Ophthalmic Solution 1 Drop(s) Both EYES two times a day  enoxaparin Injectable 30 milliGRAM(s) SubCutaneous every 12 hours  ertapenem  IVPB 1000 milliGRAM(s) IV Intermittent every 24 hours  latanoprost 0.005% Ophthalmic Solution 1 Drop(s) Both EYES at bedtime  lidocaine   4% Patch 1 Patch Transdermal daily  multivitamin 1 Tablet(s) Oral daily  pantoprazole    Tablet 40 milliGRAM(s) Oral before breakfast  polyethylene glycol 3350 17 Gram(s) Oral at bedtime  senna 2 Tablet(s) Oral at bedtime  sodium chloride 0.9%. 1000 milliLiter(s) (50 mL/Hr) IV Continuous <Continuous>  timolol 0.5% Solution 1 Drop(s) Both EYES two times a day    MEDICATIONS  (PRN):  acetaminophen     Tablet .. 650 milliGRAM(s) Oral every 6 hours PRN Moderate Pain (4 - 6)  magnesium hydroxide Suspension 30 milliLiter(s) Oral daily PRN Constipation  ondansetron Injectable 4 milliGRAM(s) IV Push every 6 hours PRN Nausea and/or Vomiting  oxyCODONE    IR 2.5 milliGRAM(s) Oral every 4 hours PRN Severe Pain (7 - 10)      __________________________________________________  REVIEW OF SYSTEMS:    CONSTITUTIONAL: No fever,   EYES: no acute visual disturbances  NECK: No pain or stiffness  RESPIRATORY: No cough; No shortness of breath  CARDIOVASCULAR: No chest pain, no palpitations  GASTROINTESTINAL: No pain. No nausea or vomiting; No diarrhea   NEUROLOGICAL: No headache or numbness, no tremors  MUSCULOSKELETAL: No joint pain, no muscle pain  GENITOURINARY: no dysuria, no frequency, no hesitancy  PSYCHIATRY: no depression , no anxiety  ALL OTHER  ROS negative        Vital Signs Last 24 Hrs  T(C): 36.6 (26 Oct 2022 05:05), Max: 36.8 (25 Oct 2022 14:23)  T(F): 97.8 (26 Oct 2022 05:05), Max: 98.3 (25 Oct 2022 14:23)  HR: 84 (26 Oct 2022 05:05) (84 - 99)  BP: 124/68 (26 Oct 2022 05:05) (114/45 - 132/68)  BP(mean): 62 (25 Oct 2022 20:47) (62 - 62)  RR: 18 (26 Oct 2022 05:05) (18 - 18)  SpO2: 100% (26 Oct 2022 05:05) (99% - 100%)    Parameters below as of 26 Oct 2022 05:05  Patient On (Oxygen Delivery Method): nasal cannula  O2 Flow (L/min): 2      ________________________________________________  PHYSICAL EXAM:  GENERAL: NAD  HEENT: Normocephalic;  conjunctivae and sclerae clear; moist mucous membranes;   NECK : supple  CHEST/LUNG: Clear to auscultation bilaterally with good air entry   HEART: S1 S2  regular; no murmurs, gallops or rubs  ABDOMEN: Soft, Nontender, Nondistended; Bowel sounds present  EXTREMITIES: no cyanosis; no edema; no calf tenderness  SKIN: warm and dry; no rash  NERVOUS SYSTEM:  Awake and alert; Oriented  to place, person and time ; no new deficits    _________________________________________________  LABS:                        9.6    8.04  )-----------( 226      ( 26 Oct 2022 07:23 )             30.9     10-26    142  |  102  |  10  ----------------------------<  96  4.4   |  38<H>  |  0.51    Ca    8.7      26 Oct 2022 07:23  Phos  3.6     10-  Mg     2.1     10-    TPro  5.9<L>  /  Alb  2.2<L>  /  TBili  0.3  /  DBili  x   /  AST  27  /  ALT  21  /  AlkPhos  83  10-      Urinalysis Basic - ( 26 Oct 2022 00:10 )    Color: Red / Appearance: Slightly Turbid / S.010 / pH: x  Gluc: x / Ketone: Trace  / Bili: Negative / Urobili: 1   Blood: x / Protein: 100 / Nitrite: Negative   Leuk Esterase: Moderate / RBC: 25-50 /HPF / WBC 6-10 /HPF   Sq Epi: x / Non Sq Epi: Few /HPF / Bacteria: Few /HPF      CAPILLARY BLOOD GLUCOSE            RADIOLOGY & ADDITIONAL TESTS:    Imaging  Reviewed:  YES/NO    Consultant(s) Notes Reviewed:   YES/ No      Plan of care was discussed with patient and /or primary care giver; all questions and concerns were addressed  NP Note discussed with Primary Attending    Patient is a 89y old  Female who presents with a chief complaint of S/P Fall,Hip dislocation (26 Oct 2022 12:15)      INTERVAL HPI/OVERNIGHT EVENTS: no new complaints. D/C planning ongoing, s/p renal US today neg for Hydro.    MEDICATIONS  (STANDING):  ALBUTerol    90 MICROgram(s) HFA Inhaler      albuterol/ipratropium for Nebulization 3 milliLiter(s) Nebulizer every 6 hours  amLODIPine   Tablet 2.5 milliGRAM(s) Oral daily  brimonidine 0.2% Ophthalmic Solution 1 Drop(s) Both EYES two times a day  enoxaparin Injectable 30 milliGRAM(s) SubCutaneous every 12 hours  ertapenem  IVPB 1000 milliGRAM(s) IV Intermittent every 24 hours  latanoprost 0.005% Ophthalmic Solution 1 Drop(s) Both EYES at bedtime  lidocaine   4% Patch 1 Patch Transdermal daily  multivitamin 1 Tablet(s) Oral daily  pantoprazole    Tablet 40 milliGRAM(s) Oral before breakfast  polyethylene glycol 3350 17 Gram(s) Oral at bedtime  senna 2 Tablet(s) Oral at bedtime  sodium chloride 0.9%. 1000 milliLiter(s) (50 mL/Hr) IV Continuous <Continuous>  timolol 0.5% Solution 1 Drop(s) Both EYES two times a day    MEDICATIONS  (PRN):  acetaminophen     Tablet .. 650 milliGRAM(s) Oral every 6 hours PRN Moderate Pain (4 - 6)  magnesium hydroxide Suspension 30 milliLiter(s) Oral daily PRN Constipation  ondansetron Injectable 4 milliGRAM(s) IV Push every 6 hours PRN Nausea and/or Vomiting  oxyCODONE    IR 2.5 milliGRAM(s) Oral every 4 hours PRN Severe Pain (7 - 10)      __________________________________________________  REVIEW OF SYSTEMS:    CONSTITUTIONAL: No fever,   EYES: no acute visual disturbances  NECK: No pain or stiffness  RESPIRATORY: No cough; No shortness of breath  CARDIOVASCULAR: No chest pain, no palpitations  GASTROINTESTINAL: No pain. No nausea or vomiting; No diarrhea   NEUROLOGICAL: No headache or numbness, no tremors  MUSCULOSKELETAL: No joint pain, no muscle pain  GENITOURINARY: no dysuria, no frequency, no hesitancy  PSYCHIATRY: no depression , no anxiety  ALL OTHER  ROS negative        Vital Signs Last 24 Hrs  T(C): 36.6 (26 Oct 2022 05:05), Max: 36.8 (25 Oct 2022 14:23)  T(F): 97.8 (26 Oct 2022 05:05), Max: 98.3 (25 Oct 2022 14:23)  HR: 84 (26 Oct 2022 05:05) (84 - 99)  BP: 124/68 (26 Oct 2022 05:05) (114/45 - 132/68)  BP(mean): 62 (25 Oct 2022 20:47) (62 - 62)  RR: 18 (26 Oct 2022 05:05) (18 - 18)  SpO2: 100% (26 Oct 2022 05:05) (99% - 100%)    Parameters below as of 26 Oct 2022 05:05  Patient On (Oxygen Delivery Method): nasal cannula  O2 Flow (L/min): 2      ________________________________________________  PHYSICAL EXAM:  GENERAL: NAD  HEENT: Normocephalic;  conjunctivae and sclerae clear; moist mucous membranes;   NECK : supple  CHEST/LUNG: Clear to auscultation bilaterally with good air entry   HEART: S1 S2  regular; no murmurs, gallops or rubs  ABDOMEN: Soft, Nontender, Nondistended; Bowel sounds present  EXTREMITIES: no cyanosis; no edema; no calf tenderness  SKIN: warm and dry; no rash  NERVOUS SYSTEM:  Awake and alert; Oriented  to place, person and time ; no new deficits    _________________________________________________  LABS:                        9.6    8.04  )-----------( 226      ( 26 Oct 2022 07:23 )             30.9     10-    142  |  102  |  10  ----------------------------<  96  4.4   |  38<H>  |  0.51    Ca    8.7      26 Oct 2022 07:23  Phos  3.6     10-  Mg     2.1     10-26    TPro  5.9<L>  /  Alb  2.2<L>  /  TBili  0.3  /  DBili  x   /  AST  27  /  ALT  21  /  AlkPhos  83  10-25      Urinalysis Basic - ( 26 Oct 2022 00:10 )    Color: Red / Appearance: Slightly Turbid / S.010 / pH: x  Gluc: x / Ketone: Trace  / Bili: Negative / Urobili: 1   Blood: x / Protein: 100 / Nitrite: Negative   Leuk Esterase: Moderate / RBC: 25-50 /HPF / WBC 6-10 /HPF   Sq Epi: x / Non Sq Epi: Few /HPF / Bacteria: Few /HPF      CAPILLARY BLOOD GLUCOSE            RADIOLOGY & ADDITIONAL TESTS:    Imaging  Reviewed:  YES/NO    Consultant(s) Notes Reviewed:   YES/ No      Plan of care was discussed with patient and /or primary care giver; all questions and concerns were addressed

## 2022-10-26 NOTE — CHART NOTE - NSCHARTNOTEFT_GEN_A_CORE
Assessment:   Patient is a 89y old  Female who presents with a chief complaint of S/p open R DOMONIQUE reduction on 10/20/2022 (26 Oct 2022 11:34). Pt seen at dinner 10/25 and lunch 10/26. Pt needing help with traty set-up and some encouragement. Ate ~60% lunch tray. Reports plans to take Ensure later      Factors impacting intake: [ ] none [ ] nausea  [ ] vomiting [ ] diarrhea [ ] constipation  [ ]chewing problems [ ] swallowing issues  [x ] other: advanced age    Diet Prescription: Soft/ bite size with Enlive BID  Intake:     Daily     Daily (no wt since 10/15)  % Weight Change    Pertinent Medications: MEDICATIONS  (STANDING):  ALBUTerol    90 MICROgram(s) HFA Inhaler      albuterol/ipratropium for Nebulization 3 milliLiter(s) Nebulizer every 6 hours  amLODIPine   Tablet 2.5 milliGRAM(s) Oral daily  brimonidine 0.2% Ophthalmic Solution 1 Drop(s) Both EYES two times a day  enoxaparin Injectable 30 milliGRAM(s) SubCutaneous every 12 hours  ertapenem  IVPB 1000 milliGRAM(s) IV Intermittent every 24 hours  latanoprost 0.005% Ophthalmic Solution 1 Drop(s) Both EYES at bedtime  lidocaine   4% Patch 1 Patch Transdermal daily  multivitamin 1 Tablet(s) Oral daily  pantoprazole    Tablet 40 milliGRAM(s) Oral before breakfast  polyethylene glycol 3350 17 Gram(s) Oral at bedtime  senna 2 Tablet(s) Oral at bedtime  sodium chloride 0.9%. 1000 milliLiter(s) (50 mL/Hr) IV Continuous <Continuous>  timolol 0.5% Solution 1 Drop(s) Both EYES two times a day    MEDICATIONS  (PRN):  acetaminophen     Tablet .. 650 milliGRAM(s) Oral every 6 hours PRN Moderate Pain (4 - 6)  magnesium hydroxide Suspension 30 milliLiter(s) Oral daily PRN Constipation  ondansetron Injectable 4 milliGRAM(s) IV Push every 6 hours PRN Nausea and/or Vomiting  oxyCODONE    IR 2.5 milliGRAM(s) Oral every 4 hours PRN Severe Pain (7 - 10)    Pertinent Labs: 10-26 Na142 mmol/L Glu 96 mg/dL K+ 4.4 mmol/L Cr  0.51 mg/dL BUN 10 mg/dL 10-26 Phos 3.6 mg/dL 10-25 Alb 2.2 g/dL<L> 10-16 Chol 144 mg/dL LDL --    HDL 50 mg/dL<L> Trig 69 mg/dL    Previous Nutrition Diagnosis:   [ ] Altered GI function  [ ]Inadequate Oral Intake [ ] Swallowing Difficulty   [ ] Altered nutrition related labs [ ] Increased Nutrient Needs [ ] Overweight/Obesity   [ ] Unintended Weight Loss [ ] Food & Nutrition Related Knowledge Deficit [x ] Malnutrition (severe PCM)  [ ] Other:     Nutrition Diagnosis is [x ] ongoing  [ ] resolved [ ] not applicable       Interventions:   Recommend  [ ] Change Diet To:  [x ] Nutrition Supplement: Enlive BID  [ ] Nutrition Support  [x ] Other: current wt (weekly wt+). MD to monitor. RD available.     Monitoring and Evaluation:   [x ] PO intake [ x ] Tolerance to diet prescription [ x ] weights [ x ] labs[ x ] follow up per protocol  [ ] other:

## 2022-10-26 NOTE — PROGRESS NOTE ADULT - PROBLEM SELECTOR PLAN 1
Pt with acute right hip pain which is somatic in nature due to s/p Open reduction of right total hip dislocation on 10/20/2022. POD# 6. GOHCO. Pt also with acute sacral pain which is somatic due to positioning.    High risk medications reviewed. Avoid polypharmacy. Avoid IV opioids. Avoid NSAIDs and benzodiazepines. Non-pharmacological sleep aides initiated. Non-opioid medications and non-pharmacological pain management measures initiated.  Opioid pain recommendations   - Continue Oxycodone 2.5 mg PO q 4 hours PRN severe pain. Monitor for sedation/ respiratory depression.   Non-opioid pain recommendations   - Continue Acetaminophen 650mg PO q6h PRN moderate pain. Monitor LFTs  - Continue Lidocaine 4% patch to sacrum.   Bowel Regimen  - Continue Miralax 17G PO daily  - Continue Senna 2 tablets at bedtime for constipation  Mild pain   - Non-pharmacological pain treatment recommendations  - Warm/ Cool packs PRN   - Repositioning extremity, elevation, imagery, relaxation, distraction.  - Physical therapy OOB if no contraindications   Recommendations discussed with primary team and RN.

## 2022-10-26 NOTE — PROGRESS NOTE ADULT - PROBLEM SELECTOR PLAN 1
postop day 2 of hip reduction  dressing changes per ortho  PT recommending MARTHA  continue pain management  tylenol 1 gram Q8H  oxycodone 2.5/5mg prn mod/severe pain, BM control

## 2022-10-26 NOTE — PROGRESS NOTE ADULT - SUBJECTIVE AND OBJECTIVE BOX
CHIEF COMPLAINT:Patient is a 89y old  Female who presents with a chief complaint of S/p open R DOMONIQUE reduction on 10/20/2022.Pt appears comfortable.    	  REVIEW OF SYSTEMS:  CONSTITUTIONAL: No fever, weight loss, or fatigue  EYES: No eye pain, visual disturbances, or discharge  ENT:  No difficulty hearing, tinnitus, vertigo; No sinus or throat pain  NECK: No pain or stiffness  RESPIRATORY: No cough, wheezing, chills or hemoptysis; No Shortness of Breath  CARDIOVASCULAR: No chest pain, palpitations, passing out, dizziness, or leg swelling  GASTROINTESTINAL: No abdominal or epigastric pain. No nausea, vomiting, or hematemesis; No diarrhea or constipation. No melena or hematochezia.  GENITOURINARY: No dysuria, frequency, hematuria, or incontinence  NEUROLOGICAL: No headaches, memory loss, loss of strength, numbness, or tremors  SKIN: No itching, burning, rashes, or lesions   LYMPH Nodes: No enlarged glands  ENDOCRINE: No heat or cold intolerance; No hair loss  MUSCULOSKELETAL: No joint pain or swelling; No muscle, back, or extremity pain  PSYCHIATRIC: No depression, anxiety, mood swings, or difficulty sleeping  HEME/LYMPH: No easy bruising, or bleeding gums  ALLERGY AND IMMUNOLOGIC: No hives or eczema	      PHYSICAL EXAM:  T(C): 36.6 (10-26-22 @ 05:05), Max: 36.8 (10-25-22 @ 14:23)  HR: 84 (10-26-22 @ 05:05) (84 - 99)  BP: 124/68 (10-26-22 @ 05:05) (114/45 - 132/68)  RR: 18 (10-26-22 @ 05:05) (18 - 18)  SpO2: 100% (10-26-22 @ 05:05) (99% - 100%)  Wt(kg): --  I&O's Summary      Appearance: Normal	  HEENT:   Normal oral mucosa, PERRL, EOMI	  Lymphatic: No lymphadenopathy  Cardiovascular: Normal S1 S2, No JVD, No murmurs, No edema  Respiratory: Lungs clear to auscultation	  Psychiatry: A & O x 3, Mood & affect appropriate  Gastrointestinal:  Soft, Non-tender, + BS	  Skin: No rashes, No ecchymoses, No cyanosis	  Neurologic: Non-focal  Extremities: Normal range of motion, No clubbing, cyanosis or edema  Vascular: Peripheral pulses palpable 2+ bilaterally    MEDICATIONS  (STANDING):  ALBUTerol    90 MICROgram(s) HFA Inhaler      albuterol/ipratropium for Nebulization 3 milliLiter(s) Nebulizer every 6 hours  amLODIPine   Tablet 2.5 milliGRAM(s) Oral daily  brimonidine 0.2% Ophthalmic Solution 1 Drop(s) Both EYES two times a day  enoxaparin Injectable 30 milliGRAM(s) SubCutaneous every 12 hours  ertapenem  IVPB 1000 milliGRAM(s) IV Intermittent every 24 hours  latanoprost 0.005% Ophthalmic Solution 1 Drop(s) Both EYES at bedtime  lidocaine   4% Patch 1 Patch Transdermal daily  multivitamin 1 Tablet(s) Oral daily  pantoprazole    Tablet 40 milliGRAM(s) Oral before breakfast  polyethylene glycol 3350 17 Gram(s) Oral at bedtime  senna 2 Tablet(s) Oral at bedtime  sodium chloride 0.9%. 1000 milliLiter(s) (50 mL/Hr) IV Continuous <Continuous>  timolol 0.5% Solution 1 Drop(s) Both EYES two times a day        	  LABS:	 	                        9.6    8.04  )-----------( 226      ( 26 Oct 2022 07:23 )             30.9     10-26    142  |  102  |  10  ----------------------------<  96  4.4   |  38<H>  |  0.51    Ca    8.7      26 Oct 2022 07:23  Phos  3.6     10-26  Mg     2.1     10-26    TPro  5.9<L>  /  Alb  2.2<L>  /  TBili  0.3  /  DBili  x   /  AST  27  /  ALT  21  /  AlkPhos  83  10-25      Lipid Profile: Cholesterol 144  LDL --  HDL 50  TG 69  Ldl calc 80  Ratio --    HgA1c:   TSH: Thyroid Stimulating Hormone, Serum: 2.66 uU/mL (10-16 @ 05:30)

## 2022-10-27 ENCOUNTER — TRANSCRIPTION ENCOUNTER (OUTPATIENT)
Age: 87
End: 2022-10-27

## 2022-10-27 VITALS
SYSTOLIC BLOOD PRESSURE: 110 MMHG | DIASTOLIC BLOOD PRESSURE: 65 MMHG | OXYGEN SATURATION: 96 % | RESPIRATION RATE: 18 BRPM | HEART RATE: 80 BPM | TEMPERATURE: 98 F

## 2022-10-27 LAB
ANION GAP SERPL CALC-SCNC: 6 MMOL/L — SIGNIFICANT CHANGE UP (ref 5–17)
BASOPHILS # BLD AUTO: 0.05 K/UL — SIGNIFICANT CHANGE UP (ref 0–0.2)
BASOPHILS NFR BLD AUTO: 0.7 % — SIGNIFICANT CHANGE UP (ref 0–2)
BUN SERPL-MCNC: 12 MG/DL — SIGNIFICANT CHANGE UP (ref 7–18)
CALCIUM SERPL-MCNC: 9 MG/DL — SIGNIFICANT CHANGE UP (ref 8.4–10.5)
CHLORIDE SERPL-SCNC: 100 MMOL/L — SIGNIFICANT CHANGE UP (ref 96–108)
CO2 SERPL-SCNC: 34 MMOL/L — HIGH (ref 22–31)
CREAT SERPL-MCNC: 0.49 MG/DL — LOW (ref 0.5–1.3)
EGFR: 87 ML/MIN/1.73M2 — SIGNIFICANT CHANGE UP
EOSINOPHIL # BLD AUTO: 0.44 K/UL — SIGNIFICANT CHANGE UP (ref 0–0.5)
EOSINOPHIL NFR BLD AUTO: 6.1 % — HIGH (ref 0–6)
GLUCOSE SERPL-MCNC: 92 MG/DL — SIGNIFICANT CHANGE UP (ref 70–99)
HCT VFR BLD CALC: 32.5 % — LOW (ref 34.5–45)
HGB BLD-MCNC: 10 G/DL — LOW (ref 11.5–15.5)
IMM GRANULOCYTES NFR BLD AUTO: 0.3 % — SIGNIFICANT CHANGE UP (ref 0–0.9)
LYMPHOCYTES # BLD AUTO: 1.66 K/UL — SIGNIFICANT CHANGE UP (ref 1–3.3)
LYMPHOCYTES # BLD AUTO: 22.8 % — SIGNIFICANT CHANGE UP (ref 13–44)
MAGNESIUM SERPL-MCNC: 2 MG/DL — SIGNIFICANT CHANGE UP (ref 1.6–2.6)
MCHC RBC-ENTMCNC: 30.8 GM/DL — LOW (ref 32–36)
MCHC RBC-ENTMCNC: 31.7 PG — SIGNIFICANT CHANGE UP (ref 27–34)
MCV RBC AUTO: 103.2 FL — HIGH (ref 80–100)
MONOCYTES # BLD AUTO: 0.54 K/UL — SIGNIFICANT CHANGE UP (ref 0–0.9)
MONOCYTES NFR BLD AUTO: 7.4 % — SIGNIFICANT CHANGE UP (ref 2–14)
NEUTROPHILS # BLD AUTO: 4.56 K/UL — SIGNIFICANT CHANGE UP (ref 1.8–7.4)
NEUTROPHILS NFR BLD AUTO: 62.7 % — SIGNIFICANT CHANGE UP (ref 43–77)
NRBC # BLD: 0 /100 WBCS — SIGNIFICANT CHANGE UP (ref 0–0)
PHOSPHATE SERPL-MCNC: 3.3 MG/DL — SIGNIFICANT CHANGE UP (ref 2.5–4.5)
PLATELET # BLD AUTO: 233 K/UL — SIGNIFICANT CHANGE UP (ref 150–400)
POTASSIUM SERPL-MCNC: 4.2 MMOL/L — SIGNIFICANT CHANGE UP (ref 3.5–5.3)
POTASSIUM SERPL-SCNC: 4.2 MMOL/L — SIGNIFICANT CHANGE UP (ref 3.5–5.3)
RBC # BLD: 3.15 M/UL — LOW (ref 3.8–5.2)
RBC # FLD: 12.5 % — SIGNIFICANT CHANGE UP (ref 10.3–14.5)
SARS-COV-2 RNA SPEC QL NAA+PROBE: SIGNIFICANT CHANGE UP
SODIUM SERPL-SCNC: 140 MMOL/L — SIGNIFICANT CHANGE UP (ref 135–145)
WBC # BLD: 7.27 K/UL — SIGNIFICANT CHANGE UP (ref 3.8–10.5)
WBC # FLD AUTO: 7.27 K/UL — SIGNIFICANT CHANGE UP (ref 3.8–10.5)

## 2022-10-27 PROCEDURE — 71045 X-RAY EXAM CHEST 1 VIEW: CPT

## 2022-10-27 PROCEDURE — 88304 TISSUE EXAM BY PATHOLOGIST: CPT

## 2022-10-27 PROCEDURE — 82962 GLUCOSE BLOOD TEST: CPT

## 2022-10-27 PROCEDURE — 36415 COLL VENOUS BLD VENIPUNCTURE: CPT

## 2022-10-27 PROCEDURE — 70450 CT HEAD/BRAIN W/O DYE: CPT | Mod: MA

## 2022-10-27 PROCEDURE — 80061 LIPID PANEL: CPT

## 2022-10-27 PROCEDURE — 84443 ASSAY THYROID STIM HORMONE: CPT

## 2022-10-27 PROCEDURE — 85730 THROMBOPLASTIN TIME PARTIAL: CPT

## 2022-10-27 PROCEDURE — 96374 THER/PROPH/DIAG INJ IV PUSH: CPT

## 2022-10-27 PROCEDURE — 83735 ASSAY OF MAGNESIUM: CPT

## 2022-10-27 PROCEDURE — 76000 FLUOROSCOPY <1 HR PHYS/QHP: CPT

## 2022-10-27 PROCEDURE — 93306 TTE W/DOPPLER COMPLETE: CPT

## 2022-10-27 PROCEDURE — 99285 EMERGENCY DEPT VISIT HI MDM: CPT | Mod: 25

## 2022-10-27 PROCEDURE — 85027 COMPLETE CBC AUTOMATED: CPT

## 2022-10-27 PROCEDURE — 87186 SC STD MICRODIL/AGAR DIL: CPT

## 2022-10-27 PROCEDURE — 85610 PROTHROMBIN TIME: CPT

## 2022-10-27 PROCEDURE — 85025 COMPLETE CBC W/AUTO DIFF WBC: CPT

## 2022-10-27 PROCEDURE — 93005 ELECTROCARDIOGRAM TRACING: CPT

## 2022-10-27 PROCEDURE — 87086 URINE CULTURE/COLONY COUNT: CPT

## 2022-10-27 PROCEDURE — 76775 US EXAM ABDO BACK WALL LIM: CPT

## 2022-10-27 PROCEDURE — 80048 BASIC METABOLIC PNL TOTAL CA: CPT

## 2022-10-27 PROCEDURE — 88311 DECALCIFY TISSUE: CPT

## 2022-10-27 PROCEDURE — 80053 COMPREHEN METABOLIC PANEL: CPT

## 2022-10-27 PROCEDURE — 86850 RBC ANTIBODY SCREEN: CPT

## 2022-10-27 PROCEDURE — 83036 HEMOGLOBIN GLYCOSYLATED A1C: CPT

## 2022-10-27 PROCEDURE — 84100 ASSAY OF PHOSPHORUS: CPT

## 2022-10-27 PROCEDURE — 72125 CT NECK SPINE W/O DYE: CPT | Mod: MA

## 2022-10-27 PROCEDURE — 86901 BLOOD TYPING SEROLOGIC RH(D): CPT

## 2022-10-27 PROCEDURE — 82607 VITAMIN B-12: CPT

## 2022-10-27 PROCEDURE — 86900 BLOOD TYPING SEROLOGIC ABO: CPT

## 2022-10-27 PROCEDURE — 76857 US EXAM PELVIC LIMITED: CPT

## 2022-10-27 PROCEDURE — 72170 X-RAY EXAM OF PELVIS: CPT

## 2022-10-27 PROCEDURE — 73552 X-RAY EXAM OF FEMUR 2/>: CPT

## 2022-10-27 PROCEDURE — 97530 THERAPEUTIC ACTIVITIES: CPT

## 2022-10-27 PROCEDURE — 87635 SARS-COV-2 COVID-19 AMP PRB: CPT

## 2022-10-27 PROCEDURE — 73502 X-RAY EXAM HIP UNI 2-3 VIEWS: CPT

## 2022-10-27 PROCEDURE — 94640 AIRWAY INHALATION TREATMENT: CPT

## 2022-10-27 PROCEDURE — 82746 ASSAY OF FOLIC ACID SERUM: CPT

## 2022-10-27 PROCEDURE — 81001 URINALYSIS AUTO W/SCOPE: CPT

## 2022-10-27 RX ORDER — MAGNESIUM HYDROXIDE 400 MG/1
30 TABLET, CHEWABLE ORAL
Qty: 0 | Refills: 0 | DISCHARGE
Start: 2022-10-27

## 2022-10-27 RX ORDER — SENNA PLUS 8.6 MG/1
2 TABLET ORAL
Qty: 0 | Refills: 0 | DISCHARGE
Start: 2022-10-27

## 2022-10-27 RX ORDER — PANTOPRAZOLE SODIUM 20 MG/1
1 TABLET, DELAYED RELEASE ORAL
Qty: 0 | Refills: 0 | DISCHARGE
Start: 2022-10-27

## 2022-10-27 RX ADMIN — Medication 1 DROP(S): at 05:31

## 2022-10-27 RX ADMIN — ENOXAPARIN SODIUM 30 MILLIGRAM(S): 100 INJECTION SUBCUTANEOUS at 10:42

## 2022-10-27 RX ADMIN — LIDOCAINE 1 PATCH: 4 CREAM TOPICAL at 13:47

## 2022-10-27 RX ADMIN — PANTOPRAZOLE SODIUM 40 MILLIGRAM(S): 20 TABLET, DELAYED RELEASE ORAL at 06:04

## 2022-10-27 RX ADMIN — BRIMONIDINE TARTRATE 1 DROP(S): 2 SOLUTION/ DROPS OPHTHALMIC at 05:31

## 2022-10-27 RX ADMIN — Medication 1 TABLET(S): at 13:48

## 2022-10-27 RX ADMIN — AMLODIPINE BESYLATE 2.5 MILLIGRAM(S): 2.5 TABLET ORAL at 06:00

## 2022-10-27 RX ADMIN — Medication 3 MILLILITER(S): at 08:33

## 2022-10-27 NOTE — PROGRESS NOTE ADULT - REASON FOR ADMISSION
R DOMONIQUE dislocation
R ODMONIQUE dislocation
Dislocated right hip
R hip dislocation
S/P Fall
S/P Fall,Hip disclocation
S/P Fall,Hip dislocation
S/P RT hip dislocation
S/p open R DOMONIQUE reduction on 10/20/2022
S/p open R DOMONIQUE reduction on 10/20/2022
R DOMONIQUE dislocation
S/P Fall
S/P Fall,Hip disclocation
S/P Fall,hip dislocation
S/P fall,hip dislocation
hip dislocation
Dislocated right hip
S/P Fall
S/P Fall,Hip dislocation
S/P fall,Hip disclocation
S/p open R DOMONIQUE reduction on 10/20/2022
R hip dislocation
right hip dislocation
hip dislocaztion
Dislocated right hip
right hip dislocation
Dislocated right hip
Dislocated right hip

## 2022-10-27 NOTE — PROGRESS NOTE ADULT - PROBLEM SELECTOR PLAN 4
Management per pcp  Increases cardiovascular risk profile   cont antibiotics for ESBL E. Coli  ID follow up

## 2022-10-27 NOTE — PROGRESS NOTE ADULT - NUTRITIONAL ASSESSMENT
This patient has been assessed with a concern for Malnutrition and has been determined to have a diagnosis/diagnoses of Severe protein-calorie malnutrition and Underweight (BMI < 19).    This patient is being managed with:   Diet Soft and Bite Sized-  Supplement Feeding Modality:  Oral  Ensure Enlive Cans or Servings Per Day:  1       Frequency:  Two Times a day  Entered: Oct 22 2022  3:58PM    

## 2022-10-27 NOTE — PROGRESS NOTE ADULT - PROBLEM SELECTOR PROBLEM 2
Pulmonary hypertension
Fall
Pulmonary hypertension
Urinary tract infection
Fall
Closed fracture dislocation of right hip joint
Pulmonary hypertension
Closed fracture dislocation of right hip joint
Pulmonary hypertension
Closed fracture dislocation of right hip joint
Closed fracture dislocation of right hip joint
Pulmonary hypertension
show
Fall
Urinary tract infection
Fall
Urinary tract infection
Urinary tract infection

## 2022-10-27 NOTE — PROGRESS NOTE ADULT - SUBJECTIVE AND OBJECTIVE BOX
Patient is a 89y old  Female who presents with a chief complaint of R hip dislocation (26 Oct 2022 13:51)    pt seen in icu [  ], reg med floor [   ], bed [  ], chair at bedside [   ], a+o x3 [  ], lethargic [  ],  nad [  ]    billingsley [  ], ngt [  ], peg [  ], et tube [  ], cent line [  ], picc line [  ]        Allergies    No Known Allergies        Vitals    T(F): 97.4 (10-27-22 @ 05:28), Max: 98.3 (10-26-22 @ 20:33)  HR: 73 (10-27-22 @ 05:28) (73 - 83)  BP: 127/74 (10-27-22 @ 05:28) (93/57 - 127/74)  RR: 18 (10-27-22 @ 05:28) (18 - 18)  SpO2: 99% (10-27-22 @ 05:28) (99% - 100%)  Wt(kg): --  CAPILLARY BLOOD GLUCOSE          Labs                          10.0   7.27  )-----------( 233      ( 27 Oct 2022 05:20 )             32.5       10-27    140  |  100  |  12  ----------------------------<  92  4.2   |  34<H>  |  0.49<L>    Ca    9.0      27 Oct 2022 05:20  Phos  3.3     10-27  Mg     2.0     10-27              Clean Catch Clean Catch (Midstream)  10-16 @ 01:53   >100,000 CFU/ml Escherichia coli  >100,000 CFU/ml Escherichia coli ESBL  --  Escherichia coli ESBL  Escherichia coli          Radiology Results      Meds    MEDICATIONS  (STANDING):  ALBUTerol    90 MICROgram(s) HFA Inhaler      albuterol/ipratropium for Nebulization 3 milliLiter(s) Nebulizer every 6 hours  amLODIPine   Tablet 2.5 milliGRAM(s) Oral daily  brimonidine 0.2% Ophthalmic Solution 1 Drop(s) Both EYES two times a day  enoxaparin Injectable 30 milliGRAM(s) SubCutaneous every 12 hours  ertapenem  IVPB 1000 milliGRAM(s) IV Intermittent every 24 hours  latanoprost 0.005% Ophthalmic Solution 1 Drop(s) Both EYES at bedtime  lidocaine   4% Patch 1 Patch Transdermal daily  multivitamin 1 Tablet(s) Oral daily  pantoprazole    Tablet 40 milliGRAM(s) Oral before breakfast  polyethylene glycol 3350 17 Gram(s) Oral at bedtime  senna 2 Tablet(s) Oral at bedtime  sodium chloride 0.9%. 1000 milliLiter(s) (50 mL/Hr) IV Continuous <Continuous>  timolol 0.5% Solution 1 Drop(s) Both EYES two times a day      MEDICATIONS  (PRN):  acetaminophen     Tablet .. 650 milliGRAM(s) Oral every 6 hours PRN Moderate Pain (4 - 6)  magnesium hydroxide Suspension 30 milliLiter(s) Oral daily PRN Constipation  ondansetron Injectable 4 milliGRAM(s) IV Push every 6 hours PRN Nausea and/or Vomiting  oxyCODONE    IR 2.5 milliGRAM(s) Oral every 4 hours PRN Severe Pain (7 - 10)      Physical Exam    Neuro :  no focal deficits  Respiratory: CTA B/L  CV: RRR, S1S2, no murmurs,   Abdominal: Soft, NT, ND +BS,  Extremities: No edema, + peripheral pulses    ASSESSMENT    Fracture of unspecified part of neck of unspecified femur, initial encounter for closed fracture    Arthritis    No significant past surgical history        PLAN

## 2022-10-27 NOTE — DISCHARGE NOTE NURSING/CASE MANAGEMENT/SOCIAL WORK - NSDCPEFALRISK_GEN_ALL_CORE
For information on Fall & Injury Prevention, visit: [email or web address] OR  [email or web address] OR  https://www.cdc.gov/steadi/patient.html

## 2022-10-27 NOTE — PROGRESS NOTE ADULT - ASSESSMENT
89 year old female, from home, hard of hearing, lives alone,  with PMHx of arthritis, Depression, scoliosis, dementia presents after a fall. Patient states that she slipped and fell landing on her right side with hip dislocation,UTI-ESBL.  1.PT.  2.Ortho f/u.  3.UTI-Abx as per ID.  4.Pain control.  5.GI and DVT prophylaxis.

## 2022-10-27 NOTE — PROGRESS NOTE ADULT - SUBJECTIVE AND OBJECTIVE BOX
89yFemale    Diagnosis:  S/p open reduction of R Total Hip arthroplasty dislocation on 10/20/2022    Patient was seen and evaluated at bedside. Patient with no acute complaints.   Pain is well controlled to the RLE. Denies any issues.   Denies CP/SOB, dyspnea, paresthesias, N/V/D, palpitations.       Vital Signs Last 24 Hrs  T(C): 36.3 (27 Oct 2022 05:28), Max: 36.8 (26 Oct 2022 14:27)  T(F): 97.4 (27 Oct 2022 05:28), Max: 98.3 (26 Oct 2022 20:33)  HR: 73 (27 Oct 2022 05:28) (73 - 83)  BP: 127/74 (27 Oct 2022 05:28) (93/57 - 127/74)  BP(mean): 69 (26 Oct 2022 20:33) (69 - 69)  RR: 18 (27 Oct 2022 05:28) (18 - 18)  SpO2: 99% (27 Oct 2022 05:28) (99% - 100%)    Parameters below as of 27 Oct 2022 05:28  Patient On (Oxygen Delivery Method): nasal cannula  O2 Flow (L/min): 2      Physical Exam:    General: NAD, resting comfortably in bed.    R Hip: No deformity, Dressing is C/D/I. Skin is pink and warm. No erythema. SILT.  Wound with no drainage, healing well.   Lower extremity: Abduction pillow not intact- it appears to be on the chair beside her, No calf tenderness, calves are soft. 2+pulses. NVI. (+)EHL/FHL/ADF/APF.  Good capillary refill. Warm, well-perfused.                                       10.0   7.27  )-----------( 233      ( 27 Oct 2022 05:20 )             32.5   10-27    140  |  100  |  12  ----------------------------<  92  4.2   |  34<H>  |  0.49<L>    Ca    9.0      27 Oct 2022 05:20  Phos  3.3     10-27  Mg     2.0     10-27        Impression:  89 y/o F S/p open reduction of R Total Hip arthroplasty dislocation on 10/20/22  Plan:  -  Pain management  -  DVT prophylaxis with Lovenox  -  Daily Physical Therapy:  WBAT to RLE  -  Discharge planning: Rehab    > orthopedically stable for discharge  -  Incentive Spirometer  -  Hip precautions/abduction pillow placed  -  Case d/w Dr. Geronimo and medical team

## 2022-10-27 NOTE — PROGRESS NOTE ADULT - SUBJECTIVE AND OBJECTIVE BOX
CHIEF COMPLAINT:Patient is a 89y old  Female who presents with a chief complaint of Dislocated right hip.Pt appears comfortable.    	  REVIEW OF SYSTEMS:  CONSTITUTIONAL: No fever, weight loss, or fatigue  EYES: No eye pain, visual disturbances, or discharge  ENT:  No difficulty hearing, tinnitus, vertigo; No sinus or throat pain  NECK: No pain or stiffness  RESPIRATORY: No cough, wheezing, chills or hemoptysis; No Shortness of Breath  CARDIOVASCULAR: No chest pain, palpitations, passing out, dizziness, or leg swelling  GASTROINTESTINAL: No abdominal or epigastric pain. No nausea, vomiting, or hematemesis; No diarrhea or constipation. No melena or hematochezia.  GENITOURINARY: No dysuria, frequency, hematuria, or incontinence  NEUROLOGICAL: No headaches, memory loss, loss of strength, numbness, or tremors  SKIN: No itching, burning, rashes, or lesions   LYMPH Nodes: No enlarged glands  ENDOCRINE: No heat or cold intolerance; No hair loss  MUSCULOSKELETAL: No joint pain or swelling; No muscle, back, or extremity pain  PSYCHIATRIC: No depression, anxiety, mood swings, or difficulty sleeping  HEME/LYMPH: No easy bruising, or bleeding gums  ALLERGY AND IMMUNOLOGIC: No hives or eczema	      PHYSICAL EXAM:  T(C): 36.3 (10-27-22 @ 05:28), Max: 36.8 (10-26-22 @ 14:27)  HR: 73 (10-27-22 @ 05:28) (73 - 83)  BP: 127/74 (10-27-22 @ 05:28) (93/57 - 127/74)  RR: 18 (10-27-22 @ 05:28) (18 - 18)  SpO2: 99% (10-27-22 @ 05:28) (99% - 100%)  Wt(kg): --  I&O's Summary    26 Oct 2022 07:01  -  27 Oct 2022 07:00  --------------------------------------------------------  IN: 0 mL / OUT: 300 mL / NET: -300 mL        Appearance: Normal	  HEENT:   Normal oral mucosa, PERRL, EOMI	  Lymphatic: No lymphadenopathy  Cardiovascular: Normal S1 S2, No JVD, No murmurs, No edema  Respiratory: Lungs clear to auscultation	  Psychiatry: A & O x 3, Mood & affect appropriate  Gastrointestinal:  Soft, Non-tender, + BS	  Skin: No rashes, No ecchymoses, No cyanosis	  Neurologic: Non-focal  Extremities: Normal range of motion, No clubbing, cyanosis or edema  Vascular: Peripheral pulses palpable 2+ bilaterally    MEDICATIONS  (STANDING):  ALBUTerol    90 MICROgram(s) HFA Inhaler      albuterol/ipratropium for Nebulization 3 milliLiter(s) Nebulizer every 6 hours  amLODIPine   Tablet 2.5 milliGRAM(s) Oral daily  brimonidine 0.2% Ophthalmic Solution 1 Drop(s) Both EYES two times a day  enoxaparin Injectable 30 milliGRAM(s) SubCutaneous every 12 hours  ertapenem  IVPB 1000 milliGRAM(s) IV Intermittent every 24 hours  latanoprost 0.005% Ophthalmic Solution 1 Drop(s) Both EYES at bedtime  lidocaine   4% Patch 1 Patch Transdermal daily  multivitamin 1 Tablet(s) Oral daily  pantoprazole    Tablet 40 milliGRAM(s) Oral before breakfast  polyethylene glycol 3350 17 Gram(s) Oral at bedtime  senna 2 Tablet(s) Oral at bedtime  sodium chloride 0.9%. 1000 milliLiter(s) (50 mL/Hr) IV Continuous <Continuous>  timolol 0.5% Solution 1 Drop(s) Both EYES two times a day        LABS:	 	                         10.0   7.27  )-----------( 233      ( 27 Oct 2022 05:20 )             32.5     10-27    140  |  100  |  12  ----------------------------<  92  4.2   |  34<H>  |  0.49<L>    Ca    9.0      27 Oct 2022 05:20  Phos  3.3     10-27  Mg     2.0     10-27      proBNP:   Lipid Profile: Cholesterol 144  LDL --  HDL 50  TG 69  Ldl calc 80  Ratio --    HgA1c:   TSH: Thyroid Stimulating Hormone, Serum: 2.66 uU/mL (10-16 @ 05:30)

## 2022-10-27 NOTE — DISCHARGE NOTE NURSING/CASE MANAGEMENT/SOCIAL WORK - PATIENT PORTAL LINK FT
You can access the FollowMyHealth Patient Portal offered by [facility] by registering at the following website: [email or web address]. By joining [employer]’s FollowMyHealth portal, you will also be able to view your health information using other applications (apps) compatible with our system.

## 2022-10-27 NOTE — PROGRESS NOTE ADULT - SUBJECTIVE AND OBJECTIVE BOX
Patient is a 89y old  Female who presents with a chief complaint of R hip dislocation (26 Oct 2022 13:51)  Awake, alert, comfortable in bed in NAD.    INTERVAL HPI/OVERNIGHT EVENTS:      VITAL SIGNS:  T(F): 97.4 (10-27-22 @ 05:28)  HR: 73 (10-27-22 @ 05:28)  BP: 127/74 (10-27-22 @ 05:28)  RR: 18 (10-27-22 @ 05:28)  SpO2: 99% (10-27-22 @ 05:28)  Wt(kg): --  I&O's Detail    26 Oct 2022 07:01  -  27 Oct 2022 07:00  --------------------------------------------------------  IN:  Total IN: 0 mL    OUT:    Voided (mL): 300 mL  Total OUT: 300 mL    Total NET: -300 mL              REVIEW OF SYSTEMS:    CONSTITUTIONAL:  No fevers, chills, sweats    HEENT:  Eyes:  No diplopia or blurred vision. ENT:  No earache, sore throat or runny nose.    CARDIOVASCULAR:  No pressure, squeezing, tightness, or heaviness about the chest; no palpitations.    RESPIRATORY:  Per HPI    GASTROINTESTINAL:  No abdominal pain, nausea, vomiting or diarrhea.    GENITOURINARY:  No dysuria, frequency or urgency.    NEUROLOGIC:  No paresthesias, fasciculations, seizures or weakness.    PSYCHIATRIC:  No disorder of thought or mood.      PHYSICAL EXAM:    Constitutional: Well developed and nourished  Eyes:Perrla  ENMT: normal  Neck:supple  Respiratory: good air entry  Cardiovascular: S1 S2 regular  Gastrointestinal: Soft, Non tender  Extremities: No edema  Vascular:normal  Neurological:Awake, alert,Ox3  Musculoskeletal:Normal      MEDICATIONS  (STANDING):  ALBUTerol    90 MICROgram(s) HFA Inhaler      albuterol/ipratropium for Nebulization 3 milliLiter(s) Nebulizer every 6 hours  amLODIPine   Tablet 2.5 milliGRAM(s) Oral daily  brimonidine 0.2% Ophthalmic Solution 1 Drop(s) Both EYES two times a day  enoxaparin Injectable 30 milliGRAM(s) SubCutaneous every 12 hours  ertapenem  IVPB 1000 milliGRAM(s) IV Intermittent every 24 hours  latanoprost 0.005% Ophthalmic Solution 1 Drop(s) Both EYES at bedtime  lidocaine   4% Patch 1 Patch Transdermal daily  multivitamin 1 Tablet(s) Oral daily  pantoprazole    Tablet 40 milliGRAM(s) Oral before breakfast  polyethylene glycol 3350 17 Gram(s) Oral at bedtime  senna 2 Tablet(s) Oral at bedtime  sodium chloride 0.9%. 1000 milliLiter(s) (50 mL/Hr) IV Continuous <Continuous>  timolol 0.5% Solution 1 Drop(s) Both EYES two times a day    MEDICATIONS  (PRN):  acetaminophen     Tablet .. 650 milliGRAM(s) Oral every 6 hours PRN Moderate Pain (4 - 6)  magnesium hydroxide Suspension 30 milliLiter(s) Oral daily PRN Constipation  ondansetron Injectable 4 milliGRAM(s) IV Push every 6 hours PRN Nausea and/or Vomiting  oxyCODONE    IR 2.5 milliGRAM(s) Oral every 4 hours PRN Severe Pain (7 - 10)      Allergies    No Known Allergies    Intolerances        LABS:                        10.0   7.27  )-----------( 233      ( 27 Oct 2022 05:20 )             32.5     10    140  |  100  |  12  ----------------------------<  92  4.2   |  34<H>  |  0.49<L>    Ca    9.0      27 Oct 2022 05:20  Phos  3.3     10-27  Mg     2.0     10-27        Urinalysis Basic - ( 26 Oct 2022 00:10 )    Color: Red / Appearance: Slightly Turbid / S.010 / pH: x  Gluc: x / Ketone: Trace  / Bili: Negative / Urobili: 1   Blood: x / Protein: 100 / Nitrite: Negative   Leuk Esterase: Moderate / RBC: 25-50 /HPF / WBC 6-10 /HPF   Sq Epi: x / Non Sq Epi: Few /HPF / Bacteria: Few /HPF            CAPILLARY BLOOD GLUCOSE            RADIOLOGY & ADDITIONAL TESTS:    CXR:    Ct scan chest:    ekg;    echo:

## 2022-12-24 NOTE — PROGRESS NOTE ADULT - PROBLEM SELECTOR PROBLEM 1
Closed fracture dislocation of right hip joint
Yes
Acute postoperative pain of right hip
Closed fracture dislocation of right hip joint
Acute postoperative pain of right hip
Acute postoperative pain of right hip
Closed fracture dislocation of right hip joint
Acute postoperative pain of right hip
Closed fracture dislocation of right hip joint

## 2023-01-20 NOTE — H&P ADULT - PROBLEM SELECTOR PROBLEM 2
Losartan (Cozaar) 50 MG tablet    Last Written Prescription Date:  04/20/2022  Last Fill Quantity: 90,   # refills: 2  Last Office Visit: 10/27/2022  Future Office visit:    Next 5 appointments (look out 90 days)    Jan 30, 2023  9:45 AM  (Arrive by 9:30 AM)  SHORT with Evelyn Olson MD  [facility] [place] ([facility] [place] ) [address]  [place] [address]  [phone]              Depression

## 2023-12-14 NOTE — DIETITIAN INITIAL EVALUATION ADULT. - DIET TYPE
Per [facility] fax needs pre determination from [facility] on Neo 2 reader and Sensor    Order was sent by Francia Ramirez  Copied into this TE    Called the [phone] and staff stated needs predermination but could not provide, provided Provider line # and transferred but was cut off and disconnected  Called [phone] and wrong number not provider line    As this is  Human referral submitted for DME    Hold for DME auth    [facility]S papers with Karen   Ensure Enlive  1can ( 240ml ) x bid ( 700 kcal, 40 g protein) Ensure Enlive 1can ( 240ml ) x bid ( 700 kcal, 40 g protein)

## 2025-01-04 NOTE — ED PROVIDER NOTE - PATIENT PORTAL LINK FT
impaired balance/decreased strength You can access the FollowMyHealth Patient Portal offered by [facility] by registering at the following website: [email or web address]. By joining [employer]’s FollowMyHealth portal, you will also be able to view your health information using other applications (apps) compatible with our system.
